# Patient Record
Sex: FEMALE | Race: WHITE
[De-identification: names, ages, dates, MRNs, and addresses within clinical notes are randomized per-mention and may not be internally consistent; named-entity substitution may affect disease eponyms.]

---

## 2017-02-01 ENCOUNTER — APPOINTMENT (OUTPATIENT)
Dept: CARDIOLOGY | Facility: CLINIC | Age: 77
End: 2017-02-01

## 2017-02-03 ENCOUNTER — APPOINTMENT (OUTPATIENT)
Dept: CARDIOLOGY | Facility: CLINIC | Age: 77
End: 2017-02-03

## 2017-03-02 ENCOUNTER — APPOINTMENT (OUTPATIENT)
Dept: CARDIOLOGY | Facility: CLINIC | Age: 77
End: 2017-03-02

## 2017-03-07 ENCOUNTER — APPOINTMENT (OUTPATIENT)
Dept: CARDIOLOGY | Facility: CLINIC | Age: 77
End: 2017-03-07

## 2017-05-19 ENCOUNTER — RX RENEWAL (OUTPATIENT)
Age: 77
End: 2017-05-19

## 2017-06-05 ENCOUNTER — OTHER (OUTPATIENT)
Age: 77
End: 2017-06-05

## 2017-06-13 ENCOUNTER — RX RENEWAL (OUTPATIENT)
Age: 77
End: 2017-06-13

## 2017-06-27 ENCOUNTER — APPOINTMENT (OUTPATIENT)
Dept: CARDIOLOGY | Facility: CLINIC | Age: 77
End: 2017-06-27

## 2017-06-27 VITALS
WEIGHT: 115 LBS | BODY MASS INDEX: 21.16 KG/M2 | OXYGEN SATURATION: 95 % | DIASTOLIC BLOOD PRESSURE: 70 MMHG | HEART RATE: 82 BPM | HEIGHT: 62 IN | SYSTOLIC BLOOD PRESSURE: 130 MMHG

## 2017-06-29 ENCOUNTER — NON-APPOINTMENT (OUTPATIENT)
Age: 77
End: 2017-06-29

## 2017-07-06 ENCOUNTER — TRANSCRIPTION ENCOUNTER (OUTPATIENT)
Age: 77
End: 2017-07-06

## 2017-07-07 ENCOUNTER — INPATIENT (INPATIENT)
Facility: HOSPITAL | Age: 77
LOS: 1 days | Discharge: HOME | End: 2017-07-09
Attending: INTERNAL MEDICINE | Admitting: INTERNAL MEDICINE
Payer: MEDICARE

## 2017-07-07 VITALS — HEIGHT: 62 IN | WEIGHT: 115.08 LBS

## 2017-07-07 DIAGNOSIS — I16.0 HYPERTENSIVE URGENCY: ICD-10-CM

## 2017-07-07 DIAGNOSIS — I25.10 ATHEROSCLEROTIC HEART DISEASE OF NATIVE CORONARY ARTERY WITHOUT ANGINA PECTORIS: ICD-10-CM

## 2017-07-07 LAB
ALBUMIN SERPL ELPH-MCNC: 3.6 G/DL — SIGNIFICANT CHANGE UP (ref 3.3–5)
ALP SERPL-CCNC: 101 U/L — SIGNIFICANT CHANGE UP (ref 40–120)
ALT FLD-CCNC: 26 U/L — SIGNIFICANT CHANGE UP (ref 12–78)
ANION GAP SERPL CALC-SCNC: 5 MMOL/L — SIGNIFICANT CHANGE UP (ref 5–17)
APTT BLD: 31.5 SEC — SIGNIFICANT CHANGE UP (ref 27.5–37.4)
AST SERPL-CCNC: 17 U/L — SIGNIFICANT CHANGE UP (ref 15–37)
BASOPHILS # BLD AUTO: 0.1 K/UL — SIGNIFICANT CHANGE UP (ref 0–0.2)
BASOPHILS NFR BLD AUTO: 0.8 % — SIGNIFICANT CHANGE UP (ref 0–2)
BILIRUB SERPL-MCNC: 0.4 MG/DL — SIGNIFICANT CHANGE UP (ref 0.2–1.2)
BUN SERPL-MCNC: 13 MG/DL — SIGNIFICANT CHANGE UP (ref 7–23)
CALCIUM SERPL-MCNC: 9.1 MG/DL — SIGNIFICANT CHANGE UP (ref 8.5–10.1)
CHLORIDE SERPL-SCNC: 103 MMOL/L — SIGNIFICANT CHANGE UP (ref 96–108)
CK SERPL-CCNC: 55 U/L — SIGNIFICANT CHANGE UP (ref 26–192)
CK SERPL-CCNC: 60 U/L — SIGNIFICANT CHANGE UP (ref 26–192)
CO2 SERPL-SCNC: 28 MMOL/L — SIGNIFICANT CHANGE UP (ref 22–31)
CREAT SERPL-MCNC: 0.76 MG/DL — SIGNIFICANT CHANGE UP (ref 0.5–1.3)
EOSINOPHIL # BLD AUTO: 0.2 K/UL — SIGNIFICANT CHANGE UP (ref 0–0.5)
EOSINOPHIL NFR BLD AUTO: 1.9 % — SIGNIFICANT CHANGE UP (ref 0–6)
GLUCOSE SERPL-MCNC: 98 MG/DL — SIGNIFICANT CHANGE UP (ref 70–99)
HCT VFR BLD CALC: 40.6 % — SIGNIFICANT CHANGE UP (ref 34.5–45)
HGB BLD-MCNC: 13.4 G/DL — SIGNIFICANT CHANGE UP (ref 11.5–15.5)
INR BLD: 1.12 RATIO — SIGNIFICANT CHANGE UP (ref 0.88–1.16)
LYMPHOCYTES # BLD AUTO: 1.8 K/UL — SIGNIFICANT CHANGE UP (ref 1–3.3)
LYMPHOCYTES # BLD AUTO: 17.7 % — SIGNIFICANT CHANGE UP (ref 13–44)
MCHC RBC-ENTMCNC: 30.4 PG — SIGNIFICANT CHANGE UP (ref 27–34)
MCHC RBC-ENTMCNC: 32.9 GM/DL — SIGNIFICANT CHANGE UP (ref 32–36)
MCV RBC AUTO: 92.3 FL — SIGNIFICANT CHANGE UP (ref 80–100)
MONOCYTES # BLD AUTO: 1.1 K/UL — HIGH (ref 0–0.9)
MONOCYTES NFR BLD AUTO: 11.2 % — SIGNIFICANT CHANGE UP (ref 2–14)
NEUTROPHILS # BLD AUTO: 6.9 K/UL — SIGNIFICANT CHANGE UP (ref 1.8–7.4)
NEUTROPHILS NFR BLD AUTO: 68.4 % — SIGNIFICANT CHANGE UP (ref 43–77)
NT-PROBNP SERPL-SCNC: 4689 PG/ML — HIGH (ref 0–450)
PLATELET # BLD AUTO: 302 K/UL — SIGNIFICANT CHANGE UP (ref 150–400)
POTASSIUM SERPL-MCNC: 4.1 MMOL/L — SIGNIFICANT CHANGE UP (ref 3.5–5.3)
POTASSIUM SERPL-SCNC: 4.1 MMOL/L — SIGNIFICANT CHANGE UP (ref 3.5–5.3)
PROT SERPL-MCNC: 7.2 GM/DL — SIGNIFICANT CHANGE UP (ref 6–8.3)
PROTHROM AB SERPL-ACNC: 12.1 SEC — SIGNIFICANT CHANGE UP (ref 9.8–12.7)
RBC # BLD: 4.4 M/UL — SIGNIFICANT CHANGE UP (ref 3.8–5.2)
RBC # FLD: 14.3 % — SIGNIFICANT CHANGE UP (ref 10.3–14.5)
SODIUM SERPL-SCNC: 136 MMOL/L — SIGNIFICANT CHANGE UP (ref 135–145)
TROPONIN I SERPL-MCNC: <0.015 NG/ML — SIGNIFICANT CHANGE UP (ref 0.01–0.04)
TROPONIN I SERPL-MCNC: <0.015 NG/ML — SIGNIFICANT CHANGE UP (ref 0.01–0.04)
WBC # BLD: 10.1 K/UL — SIGNIFICANT CHANGE UP (ref 3.8–10.5)
WBC # FLD AUTO: 10.1 K/UL — SIGNIFICANT CHANGE UP (ref 3.8–10.5)

## 2017-07-07 PROCEDURE — 99285 EMERGENCY DEPT VISIT HI MDM: CPT

## 2017-07-07 PROCEDURE — 93010 ELECTROCARDIOGRAM REPORT: CPT

## 2017-07-07 PROCEDURE — 71275 CT ANGIOGRAPHY CHEST: CPT | Mod: 26

## 2017-07-07 PROCEDURE — 99223 1ST HOSP IP/OBS HIGH 75: CPT

## 2017-07-07 PROCEDURE — 70450 CT HEAD/BRAIN W/O DYE: CPT | Mod: 26

## 2017-07-07 PROCEDURE — 71020: CPT | Mod: 26

## 2017-07-07 RX ORDER — AMLODIPINE BESYLATE 2.5 MG/1
2.5 TABLET ORAL
Qty: 0 | Refills: 0 | Status: DISCONTINUED | OUTPATIENT
Start: 2017-07-07 | End: 2017-07-08

## 2017-07-07 RX ORDER — OXYCODONE HYDROCHLORIDE 5 MG/1
5 TABLET ORAL ONCE
Qty: 0 | Refills: 0 | Status: DISCONTINUED | OUTPATIENT
Start: 2017-07-07 | End: 2017-07-07

## 2017-07-07 RX ORDER — ACETAMINOPHEN 500 MG
650 TABLET ORAL ONCE
Qty: 0 | Refills: 0 | Status: COMPLETED | OUTPATIENT
Start: 2017-07-07 | End: 2017-07-07

## 2017-07-07 RX ORDER — CARVEDILOL PHOSPHATE 80 MG/1
12.5 CAPSULE, EXTENDED RELEASE ORAL EVERY 12 HOURS
Qty: 0 | Refills: 0 | Status: DISCONTINUED | OUTPATIENT
Start: 2017-07-07 | End: 2017-07-09

## 2017-07-07 RX ORDER — CLOPIDOGREL BISULFATE 75 MG/1
75 TABLET, FILM COATED ORAL DAILY
Qty: 0 | Refills: 0 | Status: DISCONTINUED | OUTPATIENT
Start: 2017-07-07 | End: 2017-07-09

## 2017-07-07 RX ORDER — NICOTINE POLACRILEX 2 MG
1 GUM BUCCAL DAILY
Qty: 0 | Refills: 0 | Status: DISCONTINUED | OUTPATIENT
Start: 2017-07-07 | End: 2017-07-09

## 2017-07-07 RX ORDER — ASPIRIN/CALCIUM CARB/MAGNESIUM 324 MG
81 TABLET ORAL DAILY
Qty: 0 | Refills: 0 | Status: DISCONTINUED | OUTPATIENT
Start: 2017-07-07 | End: 2017-07-07

## 2017-07-07 RX ORDER — LABETALOL HCL 100 MG
20 TABLET ORAL ONCE
Qty: 0 | Refills: 0 | Status: COMPLETED | OUTPATIENT
Start: 2017-07-07 | End: 2017-07-07

## 2017-07-07 RX ORDER — LOSARTAN POTASSIUM 100 MG/1
0 TABLET, FILM COATED ORAL
Qty: 0 | Refills: 0 | COMMUNITY

## 2017-07-07 RX ORDER — METOPROLOL TARTRATE 50 MG
50 TABLET ORAL
Qty: 0 | Refills: 0 | Status: DISCONTINUED | OUTPATIENT
Start: 2017-07-07 | End: 2017-07-07

## 2017-07-07 RX ORDER — LABETALOL HCL 100 MG
10 TABLET ORAL ONCE
Qty: 0 | Refills: 0 | Status: COMPLETED | OUTPATIENT
Start: 2017-07-07 | End: 2017-07-07

## 2017-07-07 RX ORDER — LOSARTAN POTASSIUM 100 MG/1
50 TABLET, FILM COATED ORAL
Qty: 0 | Refills: 0 | Status: DISCONTINUED | OUTPATIENT
Start: 2017-07-07 | End: 2017-07-09

## 2017-07-07 RX ORDER — RANITIDINE HYDROCHLORIDE 150 MG/1
0 TABLET, FILM COATED ORAL
Qty: 0 | Refills: 0 | COMMUNITY

## 2017-07-07 RX ORDER — FAMOTIDINE 10 MG/ML
20 INJECTION INTRAVENOUS AT BEDTIME
Qty: 0 | Refills: 0 | Status: DISCONTINUED | OUTPATIENT
Start: 2017-07-07 | End: 2017-07-09

## 2017-07-07 RX ORDER — ACETAMINOPHEN 500 MG
650 TABLET ORAL EVERY 6 HOURS
Qty: 0 | Refills: 0 | Status: DISCONTINUED | OUTPATIENT
Start: 2017-07-07 | End: 2017-07-09

## 2017-07-07 RX ORDER — LOSARTAN POTASSIUM 100 MG/1
25 TABLET, FILM COATED ORAL
Qty: 0 | Refills: 0 | Status: DISCONTINUED | OUTPATIENT
Start: 2017-07-07 | End: 2017-07-07

## 2017-07-07 RX ORDER — ATORVASTATIN CALCIUM 80 MG/1
80 TABLET, FILM COATED ORAL AT BEDTIME
Qty: 0 | Refills: 0 | Status: DISCONTINUED | OUTPATIENT
Start: 2017-07-07 | End: 2017-07-09

## 2017-07-07 RX ORDER — HEPARIN SODIUM 5000 [USP'U]/ML
5000 INJECTION INTRAVENOUS; SUBCUTANEOUS EVERY 12 HOURS
Qty: 0 | Refills: 0 | Status: DISCONTINUED | OUTPATIENT
Start: 2017-07-07 | End: 2017-07-09

## 2017-07-07 RX ORDER — SODIUM CHLORIDE 9 MG/ML
3 INJECTION INTRAMUSCULAR; INTRAVENOUS; SUBCUTANEOUS ONCE
Qty: 0 | Refills: 0 | Status: COMPLETED | OUTPATIENT
Start: 2017-07-07 | End: 2017-07-07

## 2017-07-07 RX ORDER — BUDESONIDE AND FORMOTEROL FUMARATE DIHYDRATE 160; 4.5 UG/1; UG/1
2 AEROSOL RESPIRATORY (INHALATION)
Qty: 0 | Refills: 0 | Status: DISCONTINUED | OUTPATIENT
Start: 2017-07-07 | End: 2017-07-09

## 2017-07-07 RX ADMIN — Medication 81 MILLIGRAM(S): at 18:07

## 2017-07-07 RX ADMIN — LOSARTAN POTASSIUM 50 MILLIGRAM(S): 100 TABLET, FILM COATED ORAL at 18:50

## 2017-07-07 RX ADMIN — Medication 650 MILLIGRAM(S): at 13:35

## 2017-07-07 RX ADMIN — ATORVASTATIN CALCIUM 80 MILLIGRAM(S): 80 TABLET, FILM COATED ORAL at 20:50

## 2017-07-07 RX ADMIN — AMLODIPINE BESYLATE 2.5 MILLIGRAM(S): 2.5 TABLET ORAL at 20:50

## 2017-07-07 RX ADMIN — CARVEDILOL PHOSPHATE 12.5 MILLIGRAM(S): 80 CAPSULE, EXTENDED RELEASE ORAL at 18:50

## 2017-07-07 RX ADMIN — Medication 650 MILLIGRAM(S): at 13:05

## 2017-07-07 RX ADMIN — CLOPIDOGREL BISULFATE 75 MILLIGRAM(S): 75 TABLET, FILM COATED ORAL at 18:07

## 2017-07-07 RX ADMIN — OXYCODONE HYDROCHLORIDE 5 MILLIGRAM(S): 5 TABLET ORAL at 22:05

## 2017-07-07 RX ADMIN — OXYCODONE HYDROCHLORIDE 5 MILLIGRAM(S): 5 TABLET ORAL at 22:42

## 2017-07-07 RX ADMIN — SODIUM CHLORIDE 3 MILLILITER(S): 9 INJECTION INTRAMUSCULAR; INTRAVENOUS; SUBCUTANEOUS at 11:30

## 2017-07-07 RX ADMIN — Medication 10 MILLIGRAM(S): at 11:30

## 2017-07-07 RX ADMIN — Medication 20 MILLIGRAM(S): at 13:07

## 2017-07-07 RX ADMIN — Medication 10 MILLIGRAM(S): at 12:15

## 2017-07-07 NOTE — H&P ADULT - HISTORY OF PRESENT ILLNESS
65 y/o female with HTN, CAD s/p PCI, HL and remote h/o PUD and C. diff presents with /120.  Denies CP or SOB or Back pain.  HCT negative for acute finding.  CXR--no infiltrates/normal mediastinum EKG--NSR with inverted T in lat leads. Pt was given labetalol 10/10/20 IV.  Current /60 HR 70s  On my exam, awake and alert, NAD, denies CP or SOB, Back pain.  C/O L shoulder pain secondary to musculoskeletal pain. Trop negative X 1    Pt being admitted to tele

## 2017-07-07 NOTE — H&P ADULT - NSHPLABSRESULTS_GEN_ALL_CORE
13.4   10.1  )-----------( 302      ( 07 Jul 2017 10:50 )             40.6       CBC Full  -  ( 07 Jul 2017 10:50 )  WBC Count : 10.1 K/uL  Hemoglobin : 13.4 g/dL  Hematocrit : 40.6 %  Platelet Count - Automated : 302 K/uL  Mean Cell Volume : 92.3 fl  Mean Cell Hemoglobin : 30.4 pg  Mean Cell Hemoglobin Concentration : 32.9 gm/dL  Auto Neutrophil # : 6.9 K/uL  Auto Lymphocyte # : 1.8 K/uL  Auto Monocyte # : 1.1 K/uL  Auto Eosinophil # : 0.2 K/uL  Auto Basophil # : 0.1 K/uL  Auto Neutrophil % : 68.4 %  Auto Lymphocyte % : 17.7 %  Auto Monocyte % : 11.2 %  Auto Eosinophil % : 1.9 %  Auto Basophil % : 0.8 %      07-07    136  |  103  |  13  ----------------------------<  98  4.1   |  28  |  0.76    Ca    9.1      07 Jul 2017 10:50    TPro  7.2  /  Alb  3.6  /  TBili  0.4  /  DBili  x   /  AST  17  /  ALT  26  /  AlkPhos  101  07-07      PT/INR - ( 07 Jul 2017 10:50 )   PT: 12.1 sec;   INR: 1.12 ratio         PTT - ( 07 Jul 2017 10:50 )  PTT:31.5 sec        LIVER FUNCTIONS - ( 07 Jul 2017 10:50 )  Alb: 3.6 g/dL / Pro: 7.2 gm/dL / ALK PHOS: 101 U/L / ALT: 26 U/L / AST: 17 U/L / GGT: x                 CARDIAC MARKERS ( 07 Jul 2017 10:50 )  <0.015 ng/mL / x     / 60 U/L / x     / x

## 2017-07-07 NOTE — H&P ADULT - NSHPPHYSICALEXAM_GEN_ALL_CORE
Vital Signs Last 24 Hrs  T(C): 37 (07 Jul 2017 15:26), Max: 37.1 (07 Jul 2017 10:42)  T(F): 98.6 (07 Jul 2017 15:26), Max: 98.7 (07 Jul 2017 10:42)  HR: 71 (07 Jul 2017 15:26) (67 - 76)  BP: 160/60 (07 Jul 2017 15:43) (158/65 - 255/78)  BP(mean): --  RR: 18 (07 Jul 2017 15:26) (18 - 19)  SpO2: 96% (07 Jul 2017 15:26) (96% - 96%)

## 2017-07-07 NOTE — CONSULT NOTE ADULT - PROBLEM SELECTOR RECOMMENDATION 9
CT of chest to r/o dissection and BP control with increased losartan, add norvasc and change lopressor to carvedilol. Echo to evaluate any change in LV FX (elevated pro BNP)

## 2017-07-07 NOTE — ED STATDOCS - PROGRESS NOTE DETAILS
Adela Orellana: 76 y/o F with hx of HTN, CAD, and MI presents to the ED c/o high blood pressure at home. Pt states she had left arm pain since yesterday, went to urgent care yesterday, dx with tendonitis, and had a high blood pressure reading. She took her blood pressure again this morning at home and it was high, and came to the ED for further evaluation. Currently pt has no other complaints and denies CP, SOB, back pain, fever, and chills. Pt to be sent to main ED for further evaluation and monitoring due to HTN and EKG changes.

## 2017-07-07 NOTE — ED ADULT NURSE REASSESSMENT NOTE - COMFORT CARE
repositioned/warm blanket provided/side rails up/patient is comfortable and in high spirits with family is at bedside. hourly rounding completed/meal provided

## 2017-07-07 NOTE — ED ADULT NURSE REASSESSMENT NOTE - NS ED NURSE REASSESS COMMENT FT1
Ct of the head negative, pt provided meal, tolerated meal well. Will continue to monitor for safety and comfort.

## 2017-07-07 NOTE — ED ADULT NURSE REASSESSMENT NOTE - NS ED NURSE REASSESS COMMENT FT1
Dr. Garner, hospitalist is evaluating pt at this time, will continue to monitor for further instruction.

## 2017-07-07 NOTE — ED STATDOCS - MEDICAL DECISION MAKING DETAILS
I, Hong Greenwood MD, performed the initial face to face bedside interview with this patient regarding history of present illness and determined that the patient should be seen in the main ED.  The history, was documented by the scribe in my presence and I attest to the accuracy of the documentation.

## 2017-07-07 NOTE — H&P ADULT - ASSESSMENT
IMP:    63 y/o female with HTN, CAD s/p PCI, HL and remote h/o PUD and C. diff presents with hypertensive urgency    Plan:    Admit to tele    Case d/w dr doe who will see pt tonight--adjust BP meds  Cont with ASA/plavix/statin  Compare current EKG to prior  DVT prophy--sqhep  DASH diet

## 2017-07-07 NOTE — ED PROVIDER NOTE - MEDICAL DECISION MAKING DETAILS
78 y/o F c/o increased L shoulder pain seen at urgent care, HTN at urgent care, sent in to ED w/ plans to receive EKG, CXR, XR imaging.

## 2017-07-07 NOTE — CONSULT NOTE ADULT - SUBJECTIVE AND OBJECTIVE BOX
HPI:  63 y/o female with HTN, CAD (Inferior Wall MI with PCI or RCA), HL, PVD, and AAA who on wednesday was doing some work around house and exacerbated her shoulder and continued to have discomfort there and went to an urgent care center Thursday to be evaluated and was told blood pressure was 250 systolic.  Apparently was sent home from there as it was thought that machine was broken.  Pt. herself rechecked blood pressure this AM and again it was >250 mmhg sysytolic.and came to be evaluated.  In ER /120.  Denies CP or SOB or current significant back pain, but approximately 3 weeks ago had severe back pain attributed to disk disease which resolved and says now only minimal (lower back).  Noptes a history of Blood pressure differences in her arms (60mmhg).  IN ER she had a Head CT negative for acute finding. Received labetolol in ER with improvement to  /60 HR 70s. Denies wheezing, cough, phlegm or fever.  Denies orthopnea, PND, or LE edema.  Denies claudication as well.      PAST MEDICAL & SURGICAL HISTORY:  MI (myocardial infarction) Inferior .  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 Carotid angiogram with Dr. Marty Macias: 50% right internal carotid artery stenosis, 60% left internal carotid artery stenosis.  Abdominal ultrasound 2017:2.5 cm aneurysm: [facility] 2010: Mild to moderate disease on the left and mild disease on right. LmwlpApzqflr5Keomj   EK17, NSR, LAE, Lateral ST segment changes unchanged from the past. old inf. MI., NC. QnbfcDxmevhy4Npn CccyuWzktztf99Tnxhy   Stress Test: 2012, no Ischemia, no exercise induced arrhythmias, no Symptoms HhsqqJpsriub18Zik ZepafLvjiqky97Yzerh   Echo: 3/2017, 1-2 + MR.Mlid LVH, borderline pulm. HTN., normal LV function, no pulmonary hypertension, normal LA size LVEF 65%. TzfldAigxevh76Vfd KevltOoxgltx02Vdbng VdgpvExguyaa20Bge DdwhbNzgnzsp43Pcfwi HxcdyHtrerzv12Tyw QymirCgfingn35Iedpe WptpbDkydgio42Ixm PquzyUuntsye00Nsecw PurleHvrfugs13Utr TeowkRlhpdvz4Lcvbh   Cardiac Cath: , 1 Vessel CAD with acute inferior MI and EF 50 % PCI of RCA. PmlycFibouej8Bdh ZzrjiDrchewk32Gwtxr   Stent: , SOCRATES to RCA    SOCIAL HISTORY: Current 1 PPD Smoker/Social ETOH/ No Ilicit Drug use.    FAMILY HISTORY:  Family history of CAD    Allergies  predniSONE (Other (Moderate))    Intolerances    HOME MEDICATIONS:   Current Meds  CurrentMeds_4_twCiteListControlStart Advair Diskus 250-50 MCG/DOSE Inhalation Aerosol Powder Breath Activated; INHALE 1  PUFF BY MOUTH TWICE DAILY  Aspirin Low Dose 81 MG TABS; TAKE 1 TABLET DAILY  Atorvastatin Calcium 80 MG Oral Tablet; TAKE 1 TABLET BY MOUTH ONCE DAILY  Clopidogrel Bisulfate 75 MG Oral Tablet; TAKE 1 TABLET BY MOUTH ONCE DAILY  Losartan Potassium 25 MG Oral Tablet; TAKE 1 TABLET BY MOUTH TWICE DAILY  Metoprolol Tartrate 50 MG Oral Tablet; TAKE 1 TABLET BY MOUTH EVERY 12 HOURS  Prevnar 13 Intramuscular Suspension; INJECT 0.5  ML Intramuscular  ProAir  (90 Base) MCG/ACT Inhalation Aerosol Solution; inhale 2 puffs by mouth  every 4 hours if needed  RaNITidine HCl - 150 MG Oral Tablet; take 1 tablet by mouth at bedtime    HOSPITAL MEDICATIONS:   MEDICATIONS  (STANDING):  atorvastatin 80 milliGRAM(s) Oral at bedtime  clopidogrel Tablet 75 milliGRAM(s) Oral daily  heparin  Injectable 5000 Unit(s) SubCutaneous every 12 hours  metoprolol 50 milliGRAM(s) Oral two times a day  losartan 25 milliGRAM(s) Oral two times a day  aspirin  chewable 81 milliGRAM(s) Oral daily    MEDICATIONS  (PRN):  acetaminophen   Tablet. 650 milliGRAM(s) Oral every 6 hours PRN Mild Pain (1 - 3)      REVIEW OF SYSTEMS: 13 systems were reviewed and all negative except for comments above.    Vital Signs Last 24 Hrs  T(C): 36.9 (2017 17:00), Max: 37.1 (2017 10:42)  T(F): 98.5 (2017 17:00), Max: 98.7 (2017 10:42)  HR: 75 (2017 17:36) (67 - 76)  BP: 214/68 (2017 17:36) (158/64 - 255/78)  BP(mean): --  RR: 17 (2017 17:36) (17 - 21)  SpO2: 96% (2017 17:00) (96% - 96%)Daily Height in cm: 157.48 (2017 10:35)    Daily I&O's Summary      PHYSICAL EXAM:    Constitutional: NAD, awake and alert, well-developed  HEENT: PERRLA, EOMI,  No oral cyanosis. Oropharynx Clean and Dry.  Neck:  supple,  No JVD, No Thyroid enlargement. + Carotid Bruits bilaterally.  Respiratory: Breath sounds are clear bilaterally, No wheezing, rales or rhonchi  Cardiovascular: NL S1 and S2, RRR, +s4. 1/6 KOLTON to LLSB.  Gastrointestinal: Bowel Sounds present, soft, NT, ND, No HSM.   Extremities: No peripheral edema. No clubbing or cyanosis.    Vascular: 1-2+ peripheral pulses in UE bilaterally.  +1 pulse in both femoral arteries as well.   Neurological: A/O x 3, no focal motor or sensory deficits  Musculoskeletal: no calf tenderness or joint swelling.  Skin: No rashes     LABS: All Labs Reviewed:                        13.4   10.1  )-----------( 302      ( 2017 10:50 )             40.6     2017 10:50    136    |  103    |  13     ----------------------------<  98     4.1     |  28     |  0.76     Ca    9.1        2017 10:50    TPro  7.2    /  Alb  3.6    /  TBili  0.4    /  DBili  x      /  AST  17     /  ALT  26     /  AlkPhos  101    2017 10:50    PT/INR - ( 2017 10:50 )   PT: 12.1 sec;   INR: 1.12 ratio         PTT - ( 2017 10:50 )  PTT:31.5 sec  CARDIAC MARKERS ( 2017 10:50 )  <0.015 ng/mL / x     / 60 U/L / x     / x        Pro Bnp [id number] HPI:  63 y/o female with HTN, CAD (Inferior Wall MI with PCI or RCA), HL, PVD, and AAA who on wednesday was doing some work around house and exacerbated her shoulder and continued to have discomfort there and went to an urgent care center Thursday to be evaluated and was told blood pressure was 250 systolic.  Apparently was sent home from there as it was thought that machine was broken.  Pt. herself rechecked blood pressure this AM and again it was >250 mmhg sysytolic.and came to be evaluated.  In ER /120.  Denies CP or SOB or current significant back pain, but approximately 3 weeks ago had severe back pain attributed to disk disease which resolved and says now only minimal (lower back).  Noptes a history of Blood pressure differences in her arms (60mmhg).  IN ER she had a Head CT negative for acute finding. Received labetolol in ER with improvement to  /60 HR 70s. Denies wheezing, cough, phlegm or fever.  Denies orthopnea, PND, or LE edema.  Denies claudication as well.      PAST MEDICAL & SURGICAL HISTORY:  MI (myocardial infarction) Inferior .  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 Carotid angiogram with Dr. Marty Macias: 50% right internal carotid artery stenosis, 60% left internal carotid artery stenosis.  Abdominal ultrasound 2017:2.5 cm aneurysm: [facility] 2010: Mild to moderate disease on the left and mild disease on right. EvsejFavtbeh2Ikqot   EK17, NSR, LAE, Lateral ST segment changes unchanged from the past. old inf. MI., NC. CfgqlTmoglgb4Hnf OfjkiMfstrmt21Dpcqb   Stress Test: 2012, no Ischemia, no exercise induced arrhythmias, no Symptoms ZwxmwQfomrns64Nxy QazktOdccaqd91Uxdpf   Echo: 3/2017, 1-2 + MR.Mlid LVH, borderline pulm. HTN., normal LV function, no pulmonary hypertension, normal LA size LVEF 65%. IvmjjTbmghxv31Pck AezmoBjhkcfx12Tonzy KokvuHtrbgiz58Ndp WhjdkCrkskcy83Zlkoi KzebgRvpaxax82Puh BcywdTcwnzgk66Pzdsj HfpqkBhchnjl77Ldn AujinAzwcvfz27Qzqds AixwzVstphng62Qzt NwyinYmshcql2Ulcwf   Cardiac Cath: , 1 Vessel CAD with acute inferior MI and EF 50 % PCI of RCA. GbwrpPzjkgob6Jtl FqepeDvmnkxt20Ubdcc   Stent: , SOCRATES to RCA    SOCIAL HISTORY: Current 1 PPD Smoker/Social ETOH/ No Ilicit Drug use.    FAMILY HISTORY:  Family history of CAD    Allergies  predniSONE (Other (Moderate))    Intolerances    HOME MEDICATIONS:   Current Meds  CurrentMeds_4_twCiteListControlStart Advair Diskus 250-50 MCG/DOSE Inhalation Aerosol Powder Breath Activated; INHALE 1  PUFF BY MOUTH TWICE DAILY  Aspirin Low Dose 81 MG TABS; TAKE 1 TABLET DAILY  Atorvastatin Calcium 80 MG Oral Tablet; TAKE 1 TABLET BY MOUTH ONCE DAILY  Clopidogrel Bisulfate 75 MG Oral Tablet; TAKE 1 TABLET BY MOUTH ONCE DAILY  Losartan Potassium 25 MG Oral Tablet; TAKE 1 TABLET BY MOUTH TWICE DAILY  Metoprolol Tartrate 50 MG Oral Tablet; TAKE 1 TABLET BY MOUTH EVERY 12 HOURS  Prevnar 13 Intramuscular Suspension; INJECT 0.5  ML Intramuscular  ProAir  (90 Base) MCG/ACT Inhalation Aerosol Solution; inhale 2 puffs by mouth  every 4 hours if needed  RaNITidine HCl - 150 MG Oral Tablet; take 1 tablet by mouth at bedtime    HOSPITAL MEDICATIONS:   MEDICATIONS  (STANDING):  atorvastatin 80 milliGRAM(s) Oral at bedtime  clopidogrel Tablet 75 milliGRAM(s) Oral daily  heparin  Injectable 5000 Unit(s) SubCutaneous every 12 hours  metoprolol 50 milliGRAM(s) Oral two times a day  losartan 25 milliGRAM(s) Oral two times a day  aspirin  chewable 81 milliGRAM(s) Oral daily    MEDICATIONS  (PRN):  acetaminophen   Tablet. 650 milliGRAM(s) Oral every 6 hours PRN Mild Pain (1 - 3)      REVIEW OF SYSTEMS: 13 systems were reviewed and all negative except for comments above.    Vital Signs Last 24 Hrs  T(C): 36.9 (2017 17:00), Max: 37.1 (2017 10:42)  T(F): 98.5 (2017 17:00), Max: 98.7 (2017 10:42)  HR: 75 (2017 17:36) (67 - 76)  BP: 214/68 (2017 17:36) (158/64 - 255/78)  BP(mean): --  RR: 17 (2017 17:36) (17 - 21)  SpO2: 96% (2017 17:00) (96% - 96%)Daily Height in cm: 157.48 (2017 10:35)    Daily I&O's Summary      PHYSICAL EXAM:    Constitutional: NAD, awake and alert, well-developed  HEENT: PERRLA, EOMI,  No oral cyanosis. Oropharynx Clean and Dry.  Neck:  supple,  No JVD, No Thyroid enlargement. + Carotid Bruits bilaterally.  Respiratory: Breath sounds are clear bilaterally, No wheezing, rales or rhonchi  Cardiovascular: NL S1 and S2, RRR, +s4. 1/6 KOLTON to LLSB.  Gastrointestinal: Bowel Sounds present, soft, NT, ND, No HSM.   Extremities: No peripheral edema. No clubbing or cyanosis.    Vascular: 1-2+ peripheral pulses in UE bilaterally.  +1 pulse in both femoral arteries as well.   Neurological: A/O x 3, no focal motor or sensory deficits  Musculoskeletal: no calf tenderness or joint swelling.  Skin: No rashes     LABS: All Labs Reviewed:                        13.4   10.1  )-----------( 302      ( 2017 10:50 )             40.6     2017 10:50    136    |  103    |  13     ----------------------------<  98     4.1     |  28     |  0.76     Ca    9.1        2017 10:50    TPro  7.2    /  Alb  3.6    /  TBili  0.4    /  DBili  x      /  AST  17     /  ALT  26     /  AlkPhos  101    2017 10:50    PT/INR - ( 2017 10:50 )   PT: 12.1 sec;   INR: 1.12 ratio         PTT - ( 2017 10:50 )  PTT:31.5 sec  CARDIAC MARKERS ( 2017 10:50 )  <0.015 ng/mL / x     / 60 U/L / x     / x        Pro Bnp [id number]    EKG: NSR, inferior wall MI with inferolateral ST changes, LAE  (unchanged from old)

## 2017-07-07 NOTE — ED PROVIDER NOTE - OBJECTIVE STATEMENT
78 y/o F with hx of HTN, CAD, COPD and MI presents to the ED c/o high blood pressure at home. Pt states she had left arm pain since yesterday, went to urgent care yesterday, dx with tendonitis, and had a high blood pressure reading. She took her blood pressure again this morning at home and it was high, and came to the ED for further evaluation. Currently pt has no other complaints and denies CP, SOB, back pain, fever, and chills. Pt arm pain not related to cardiac as pt not experiencing SOB, CP.

## 2017-07-07 NOTE — ED ADULT TRIAGE NOTE - CHIEF COMPLAINT QUOTE
Left arm pain from what patient states is tendonitis and hurts upon movement extends to back of neck. Pt also states she is hypertensive and has a headache for 2 days

## 2017-07-08 LAB
ANION GAP SERPL CALC-SCNC: 7 MMOL/L — SIGNIFICANT CHANGE UP (ref 5–17)
BUN SERPL-MCNC: 15 MG/DL — SIGNIFICANT CHANGE UP (ref 7–23)
CALCIUM SERPL-MCNC: 8.5 MG/DL — SIGNIFICANT CHANGE UP (ref 8.5–10.1)
CHLORIDE SERPL-SCNC: 104 MMOL/L — SIGNIFICANT CHANGE UP (ref 96–108)
CK SERPL-CCNC: 49 U/L — SIGNIFICANT CHANGE UP (ref 26–192)
CK SERPL-CCNC: 49 U/L — SIGNIFICANT CHANGE UP (ref 26–192)
CO2 SERPL-SCNC: 27 MMOL/L — SIGNIFICANT CHANGE UP (ref 22–31)
CREAT SERPL-MCNC: 0.64 MG/DL — SIGNIFICANT CHANGE UP (ref 0.5–1.3)
GLUCOSE SERPL-MCNC: 93 MG/DL — SIGNIFICANT CHANGE UP (ref 70–99)
HCT VFR BLD CALC: 35.1 % — SIGNIFICANT CHANGE UP (ref 34.5–45)
HGB BLD-MCNC: 11.7 G/DL — SIGNIFICANT CHANGE UP (ref 11.5–15.5)
MAGNESIUM SERPL-MCNC: 2 MG/DL — SIGNIFICANT CHANGE UP (ref 1.6–2.6)
MCHC RBC-ENTMCNC: 30.7 PG — SIGNIFICANT CHANGE UP (ref 27–34)
MCHC RBC-ENTMCNC: 33.4 GM/DL — SIGNIFICANT CHANGE UP (ref 32–36)
MCV RBC AUTO: 92.1 FL — SIGNIFICANT CHANGE UP (ref 80–100)
PHOSPHATE SERPL-MCNC: 2.9 MG/DL — SIGNIFICANT CHANGE UP (ref 2.5–4.5)
PLATELET # BLD AUTO: 266 K/UL — SIGNIFICANT CHANGE UP (ref 150–400)
POTASSIUM SERPL-MCNC: 3.9 MMOL/L — SIGNIFICANT CHANGE UP (ref 3.5–5.3)
POTASSIUM SERPL-SCNC: 3.9 MMOL/L — SIGNIFICANT CHANGE UP (ref 3.5–5.3)
RBC # BLD: 3.81 M/UL — SIGNIFICANT CHANGE UP (ref 3.8–5.2)
RBC # FLD: 14 % — SIGNIFICANT CHANGE UP (ref 10.3–14.5)
SODIUM SERPL-SCNC: 138 MMOL/L — SIGNIFICANT CHANGE UP (ref 135–145)
TROPONIN I SERPL-MCNC: <0.015 NG/ML — SIGNIFICANT CHANGE UP (ref 0.01–0.04)
TROPONIN I SERPL-MCNC: <0.015 NG/ML — SIGNIFICANT CHANGE UP (ref 0.01–0.04)
WBC # BLD: 8.1 K/UL — SIGNIFICANT CHANGE UP (ref 3.8–10.5)
WBC # FLD AUTO: 8.1 K/UL — SIGNIFICANT CHANGE UP (ref 3.8–10.5)

## 2017-07-08 PROCEDURE — 93306 TTE W/DOPPLER COMPLETE: CPT | Mod: 26

## 2017-07-08 PROCEDURE — 93010 ELECTROCARDIOGRAM REPORT: CPT

## 2017-07-08 PROCEDURE — 99233 SBSQ HOSP IP/OBS HIGH 50: CPT

## 2017-07-08 RX ORDER — ALBUTEROL 90 UG/1
2.5 AEROSOL, METERED ORAL ONCE
Qty: 0 | Refills: 0 | Status: COMPLETED | OUTPATIENT
Start: 2017-07-08 | End: 2017-07-08

## 2017-07-08 RX ORDER — ASPIRIN/CALCIUM CARB/MAGNESIUM 324 MG
81 TABLET ORAL DAILY
Qty: 0 | Refills: 0 | Status: DISCONTINUED | OUTPATIENT
Start: 2017-07-08 | End: 2017-07-09

## 2017-07-08 RX ORDER — AMLODIPINE BESYLATE 2.5 MG/1
5 TABLET ORAL EVERY 12 HOURS
Qty: 0 | Refills: 0 | Status: DISCONTINUED | OUTPATIENT
Start: 2017-07-08 | End: 2017-07-09

## 2017-07-08 RX ADMIN — BUDESONIDE AND FORMOTEROL FUMARATE DIHYDRATE 2 PUFF(S): 160; 4.5 AEROSOL RESPIRATORY (INHALATION) at 09:09

## 2017-07-08 RX ADMIN — Medication 650 MILLIGRAM(S): at 21:18

## 2017-07-08 RX ADMIN — CARVEDILOL PHOSPHATE 12.5 MILLIGRAM(S): 80 CAPSULE, EXTENDED RELEASE ORAL at 17:31

## 2017-07-08 RX ADMIN — Medication 81 MILLIGRAM(S): at 15:46

## 2017-07-08 RX ADMIN — FAMOTIDINE 20 MILLIGRAM(S): 10 INJECTION INTRAVENOUS at 21:18

## 2017-07-08 RX ADMIN — BUDESONIDE AND FORMOTEROL FUMARATE DIHYDRATE 2 PUFF(S): 160; 4.5 AEROSOL RESPIRATORY (INHALATION) at 20:40

## 2017-07-08 RX ADMIN — Medication 650 MILLIGRAM(S): at 10:57

## 2017-07-08 RX ADMIN — ALBUTEROL 2.5 MILLIGRAM(S): 90 AEROSOL, METERED ORAL at 20:42

## 2017-07-08 RX ADMIN — LOSARTAN POTASSIUM 50 MILLIGRAM(S): 100 TABLET, FILM COATED ORAL at 17:31

## 2017-07-08 RX ADMIN — CLOPIDOGREL BISULFATE 75 MILLIGRAM(S): 75 TABLET, FILM COATED ORAL at 13:08

## 2017-07-08 RX ADMIN — LOSARTAN POTASSIUM 50 MILLIGRAM(S): 100 TABLET, FILM COATED ORAL at 05:56

## 2017-07-08 RX ADMIN — AMLODIPINE BESYLATE 2.5 MILLIGRAM(S): 2.5 TABLET ORAL at 05:55

## 2017-07-08 RX ADMIN — ATORVASTATIN CALCIUM 80 MILLIGRAM(S): 80 TABLET, FILM COATED ORAL at 21:18

## 2017-07-08 RX ADMIN — AMLODIPINE BESYLATE 5 MILLIGRAM(S): 2.5 TABLET ORAL at 16:54

## 2017-07-08 RX ADMIN — Medication 650 MILLIGRAM(S): at 22:18

## 2017-07-08 RX ADMIN — CARVEDILOL PHOSPHATE 12.5 MILLIGRAM(S): 80 CAPSULE, EXTENDED RELEASE ORAL at 05:56

## 2017-07-08 NOTE — CHART NOTE - NSCHARTNOTEFT_GEN_A_CORE
called by rn for patient wheezing    albuterol x 1 ordered.  patient on advair and albuterol at home for respiratory disease

## 2017-07-08 NOTE — PROGRESS NOTE ADULT - SUBJECTIVE AND OBJECTIVE BOX
PCP:    REQUESTING PHYSICIAN:    REASON FOR CONSULT:    CHIEF COMPLAINT:    HPI:  63 y/o female with HTN, CAD (Inferior Wall MI with PCI or RCA), HL, PVD, and AAA who on wednesday was doing some work around house and exacerbated her shoulder and continued to have discomfort there and went to an urgent care center Thursday to be evaluated and was told blood pressure was 250 systolic.  Apparently was sent home from there as it was thought that machine was broken.  Pt. herself rechecked blood pressure this AM and again it was >250 mmhg sysytolic.and came to be evaluated.  In ER /120.  Denies CP or SOB or current significant back pain, but approximately 3 weeks ago had severe back pain attributed to disk disease which resolved and says now only minimal (lower back).  Notes a history of Blood pressure differences in her arms (60mmhg).  IN ER she had a Head CT negative for acute finding. Received labetolol in ER with improvement to  /60 HR 70s. Denies wheezing, cough, phlegm or fever.  Denies orthopnea, PND, or LE edema.  Denies claudication as well.     7/8/17: Pt feels improved today. She denies chest pain or shortness of breath.          PAST MEDICAL & SURGICAL HISTORY:  MI (myocardial infarction)  CAD (coronary artery disease)  HTN (hypertension)  No significant past surgical history      SOCIAL HISTORY:    FAMILY HISTORY:  No pertinent family history in first degree relatives      ALLERGIES:  Allergies    predniSONE (Other (Moderate))    Intolerances        MEDICATIONS:    MEDICATIONS  (STANDING):  atorvastatin 80 milliGRAM(s) Oral at bedtime  clopidogrel Tablet 75 milliGRAM(s) Oral daily  heparin  Injectable 5000 Unit(s) SubCutaneous every 12 hours  nicotine - 21 mG/24Hr(s) Patch 1 patch Transdermal daily  losartan 50 milliGRAM(s) Oral two times a day  carvedilol 12.5 milliGRAM(s) Oral every 12 hours  amLODIPine   Tablet 2.5 milliGRAM(s) Oral two times a day  buDESOnide 160 MICROgram(s)/formoterol 4.5 MICROgram(s) Inhaler 2 Puff(s) Inhalation two times a day  famotidine    Tablet 20 milliGRAM(s) Oral at bedtime    MEDICATIONS  (PRN):  acetaminophen   Tablet. 650 milliGRAM(s) Oral every 6 hours PRN Mild Pain (1 - 3)        Vital Signs Last 24 Hrs  T(C): 36.8 (08 Jul 2017 05:35), Max: 37.1 (07 Jul 2017 10:42)  T(F): 98.3 (08 Jul 2017 05:35), Max: 98.7 (07 Jul 2017 10:42)  HR: 83 (08 Jul 2017 05:35) (67 - 83)  BP: 181/69 (08 Jul 2017 05:35) (157/60 - 255/78)  BP(mean): 95 (08 Jul 2017 05:35) (95 - 95)  RR: 17 (07 Jul 2017 17:36) (17 - 21)  SpO2: 94% (08 Jul 2017 05:35) (94% - 96%)Daily Height in cm: 157.48 (07 Jul 2017 10:35)    Daily I&O's Summary      PHYSICAL EXAM:    Constitutional: NAD, awake and alert, well-developed  HEENT: PERR, EOMI,  No oral cyananosis.  Neck:  supple,  No JVD  Respiratory: Breath sounds are clear bilaterally, No wheezing, rales or rhonchi  Cardiovascular: S1 and S2, regular rate and rhythm, no Murmurs, gallops or rubs  Gastrointestinal: Bowel Sounds present, soft, nontender.   Extremities: No peripheral edema. No clubbing or cyanosis.  Vascular: 2+ peripheral pulses  Neurological: A/O x 3, no focal deficits  Musculoskeletal: no calf tenderness.  Skin: No rashes.      LABS: All Labs Reviewed:                        11.7   8.1   )-----------( 266      ( 08 Jul 2017 04:09 )             35.1                         13.4   10.1  )-----------( 302      ( 07 Jul 2017 10:50 )             40.6     08 Jul 2017 04:09    138    |  104    |  15     ----------------------------<  93     3.9     |  27     |  0.64   07 Jul 2017 10:50    136    |  103    |  13     ----------------------------<  98     4.1     |  28     |  0.76     Ca    8.5        08 Jul 2017 04:09  Ca    9.1        07 Jul 2017 10:50  Phos  2.9       08 Jul 2017 04:09  Mg     2.0       08 Jul 2017 04:09    TPro  7.2    /  Alb  3.6    /  TBili  0.4    /  DBili  x      /  AST  17     /  ALT  26     /  AlkPhos  101    07 Jul 2017 10:50    PT/INR - ( 07 Jul 2017 10:50 )   PT: 12.1 sec;   INR: 1.12 ratio         PTT - ( 07 Jul 2017 10:50 )  PTT:31.5 sec  CARDIAC MARKERS ( 08 Jul 2017 04:09 )  <0.015 ng/mL / x     / 49 U/L / x     / x      CARDIAC MARKERS ( 07 Jul 2017 19:31 )  <0.015 ng/mL / x     / 55 U/L / x     / x      CARDIAC MARKERS ( 07 Jul 2017 10:50 )  <0.015 ng/mL / x     / 60 U/L / x     / x          Blood Culture:   07-07 @ 10:50  Pro Bnp [id number]        RADIOLOGY/EKG:< from: 12 Lead ECG (07.07.17 @ 10:39) >  Diagnosis Line Normal sinus rhythm  Possible Left atrial enlargement  Possible Inferior infarct , age undetermined  Cannot rule out Anterior infarct , age undetermined  ST & T wave abnormality, consider lateral ischemia  Abnormal ECG    Confirmed by Betito Porter MD ([id number]) on 7/7/2017 6:07:40 PM    < end of copied text >        ECHO/CARDIAC CATHTERIZATION/STRESS TEST:

## 2017-07-08 NOTE — PROGRESS NOTE ADULT - SUBJECTIVE AND OBJECTIVE BOX
65 y/o female with HTN, CAD s/p PCI, HL and remote h/o PUD and C. diff presents with /120.  Denies CP or SOB or Back pain.  HCT negative for acute finding.  CXR--no infiltrates/normal mediastinum EKG--NSR with inverted T in lat leads. Pt was given labetalol 10/10/20 IV.  Current /60 HR 70s  On my exam, awake and alert, NAD, denies CP or SOB, Back pain.  C/O L shoulder pain secondary to musculoskeletal pain. Trop negative X 1        07/08/17: Patient seen and examined. Feels better today. Blood pressure improved.      Vital Signs Last 24 Hrs  T(C): 37.1 (08 Jul 2017 12:21), Max: 37.1 (08 Jul 2017 12:21)  T(F): 98.8 (08 Jul 2017 12:21), Max: 98.8 (08 Jul 2017 12:21)  HR: 78 (08 Jul 2017 12:21) (67 - 83)  BP: 154/62 (08 Jul 2017 12:21) (149/55 - 214/68)  BP(mean): 95 (08 Jul 2017 05:35) (95 - 95)  RR: 14 (08 Jul 2017 12:21) (14 - 21)  SpO2: 95% (08 Jul 2017 12:21) (93% - 96%)          Physical Exam:  · Constitutional	Well-developed, well nourished	  · Respiratory	Breath Sounds equal & clear to percussion & auscultation, no accessory muscle use	  · Cardiovascular	Regular rate & rhythm, normal S1, S2; no murmurs, gallops or rubs; no S3, S4	  · Gastrointestinal	Soft, non-tender, no hepatosplenomegaly, normal bowel sounds	  · Vascular	Equal and normal pulses (carotid, femoral, dorsalis pedis)	  · Neurological	Alert & oriented; no sensory, motor or coordination deficits, normal reflexes	  · Musculoskeletal	No joint pain, swelling or deformity; no limitation of movement	          MEDICATIONS  (STANDING):  atorvastatin 80 milliGRAM(s) Oral at bedtime  clopidogrel Tablet 75 milliGRAM(s) Oral daily  heparin  Injectable 5000 Unit(s) SubCutaneous every 12 hours  nicotine - 21 mG/24Hr(s) Patch 1 patch Transdermal daily  losartan 50 milliGRAM(s) Oral two times a day  carvedilol 12.5 milliGRAM(s) Oral every 12 hours  buDESOnide 160 MICROgram(s)/formoterol 4.5 MICROgram(s) Inhaler 2 Puff(s) Inhalation two times a day  famotidine    Tablet 20 milliGRAM(s) Oral at bedtime  amLODIPine   Tablet 5 milliGRAM(s) Oral every 12 hours  aspirin enteric coated 81 milliGRAM(s) Oral daily    MEDICATIONS  (PRN):  acetaminophen   Tablet. 650 milliGRAM(s) Oral every 6 hours PRN Mild Pain (1 - 3)            Assessment and Plan:   Assessment:  · Assessment		  IMP:    65 y/o female with HTN, CAD s/p PCI, HL and remote h/o PUD and C. diff presents with hypertensive urgency    1. Hypertensive urgency-  Improving BP  Continue coreg, cozaar.  Norvasc dose was increased today.  Dr Luna consult appreciated.  CT head was negative for any acute changes.   watch for another day.    2. CAD S/P stent-  Continue aspirin, plavix, lipitor    3. Hyperlipidemia-continue lipitor.    4. H/O PUD-continue H2 blocker.    5. DVT prophylaxis.      Possible d/c tomorrow

## 2017-07-09 ENCOUNTER — TRANSCRIPTION ENCOUNTER (OUTPATIENT)
Age: 77
End: 2017-07-09

## 2017-07-09 VITALS
SYSTOLIC BLOOD PRESSURE: 110 MMHG | RESPIRATION RATE: 16 BRPM | HEART RATE: 68 BPM | OXYGEN SATURATION: 96 % | DIASTOLIC BLOOD PRESSURE: 52 MMHG | TEMPERATURE: 98 F

## 2017-07-09 PROCEDURE — 93010 ELECTROCARDIOGRAM REPORT: CPT

## 2017-07-09 RX ORDER — AMLODIPINE BESYLATE 2.5 MG/1
1 TABLET ORAL
Qty: 60 | Refills: 0 | OUTPATIENT
Start: 2017-07-09 | End: 2017-08-08

## 2017-07-09 RX ORDER — ALBUTEROL 90 UG/1
2.5 AEROSOL, METERED ORAL ONCE
Qty: 0 | Refills: 0 | Status: COMPLETED | OUTPATIENT
Start: 2017-07-09 | End: 2017-07-09

## 2017-07-09 RX ORDER — ALBUTEROL 90 UG/1
2.5 AEROSOL, METERED ORAL EVERY 6 HOURS
Qty: 0 | Refills: 0 | Status: DISCONTINUED | OUTPATIENT
Start: 2017-07-09 | End: 2017-07-09

## 2017-07-09 RX ADMIN — CLOPIDOGREL BISULFATE 75 MILLIGRAM(S): 75 TABLET, FILM COATED ORAL at 11:06

## 2017-07-09 RX ADMIN — BUDESONIDE AND FORMOTEROL FUMARATE DIHYDRATE 2 PUFF(S): 160; 4.5 AEROSOL RESPIRATORY (INHALATION) at 08:58

## 2017-07-09 RX ADMIN — LOSARTAN POTASSIUM 50 MILLIGRAM(S): 100 TABLET, FILM COATED ORAL at 05:46

## 2017-07-09 RX ADMIN — ALBUTEROL 2.5 MILLIGRAM(S): 90 AEROSOL, METERED ORAL at 03:07

## 2017-07-09 RX ADMIN — Medication 81 MILLIGRAM(S): at 11:06

## 2017-07-09 RX ADMIN — CARVEDILOL PHOSPHATE 12.5 MILLIGRAM(S): 80 CAPSULE, EXTENDED RELEASE ORAL at 05:46

## 2017-07-09 RX ADMIN — AMLODIPINE BESYLATE 5 MILLIGRAM(S): 2.5 TABLET ORAL at 05:46

## 2017-07-09 NOTE — CHART NOTE - NSCHARTNOTEFT_GEN_A_CORE
RN called for episode of SOB.    Pt seen and examined bedside.  Pt laying comfortably in bed with albuterol treatment going, reported feeling better. Denied any further SOB, denied chest pain. Breathing improved w/ albuterol treatment.    Vital Signs Last 24 Hrs  T(C): 37 (08 Jul 2017 16:54), Max: 37.1 (08 Jul 2017 12:21)  T(F): 98.6 (08 Jul 2017 16:54), Max: 98.8 (08 Jul 2017 12:21)  HR: 76 (09 Jul 2017 02:48) (73 - 84)  BP: 163/58 (09 Jul 2017 02:48) (149/55 - 197/83)  BP(mean): 111 (08 Jul 2017 16:54) (95 - 111)  RR: 16 (08 Jul 2017 16:54) (14 - 16)  SpO2: 98% (08 Jul 2017 20:56) (93% - 98%)    Physical exam:  Gen: AOx3, NAD  Resp: non-labored breathing, no cyanosis. Non-tachypneic. Good air entry b/l.  Cardio: +s1/s2    A/p:    #SOB  continue monitoring patient  f/u w/ primary team if needs albuterol or duoneb PRN  -no intervention at this time as patient stable and feeling well    Discussed w/ Dr. Dougherty PGY2

## 2017-07-09 NOTE — DISCHARGE NOTE ADULT - PATIENT PORTAL LINK FT
“You can access the [employer] Patient Portal, offered by [facility], by registering with the following website: [email or web address]”

## 2017-07-09 NOTE — DISCHARGE NOTE ADULT - HOSPITAL COURSE
65 y/o female with HTN, CAD s/p PCI, HL and remote h/o PUD and C. diff presents with /120.  Denies CP or SOB or Back pain.  HCT negative for acute finding.  CXR--no infiltrates/normal mediastinum EKG--NSR with inverted T in lat leads. Pt was given labetalol 10/10/20 IV.  Current /60 HR 70s  On my exam, awake and alert, NAD, denies CP or SOB, Back pain.  C/O L shoulder pain secondary to musculoskeletal pain. Trop negative X 1    Patient seen and examined. Feels much better today. BP improved. Discussed with patient regarding d/c plan.      Vital Signs Last 24 Hrs  T(C): 36.8 (09 Jul 2017 11:02), Max: 37 (08 Jul 2017 16:54)  T(F): 98.3 (09 Jul 2017 11:02), Max: 98.6 (08 Jul 2017 16:54)  HR: 68 (09 Jul 2017 11:02) (68 - 84)  BP: 110/52 (09 Jul 2017 11:02) (110/52 - 197/83)  BP(mean): 111 (08 Jul 2017 16:54) (111 - 111)  RR: 16 (09 Jul 2017 11:02) (16 - 16)  SpO2: 96% (09 Jul 2017 11:02) (93% - 98%)    Physical Exam:  · Constitutional	Well-developed, well nourished	  · Respiratory	Breath Sounds equal & clear to percussion & auscultation, no accessory muscle use	  · Cardiovascular	Regular rate & rhythm, normal S1, S2; no murmurs, gallops or rubs; no S3, S4	  · Gastrointestinal	Soft, non-tender, no hepatosplenomegaly, normal bowel sounds	  · Vascular	Equal and normal pulses (carotid, femoral, dorsalis pedis)	  · Neurological	Alert & oriented; no sensory, motor or coordination deficits, normal reflexes	  · Musculoskeletal	No joint pain, swelling or deformity; no limitation of movement	      3 y/o female with HTN, CAD s/p PCI, HL and remote h/o PUD and C. diff presents with hypertensive urgency    1. Hypertensive urgency-  Improved  Continue BB, cozaar.  Norvas   Dr Luna consult appreciated.  CT head was negative for any acute changes.   watch for another day.    2. CAD S/P stent-  Continue aspirin, plavix, lipitor    3. Hyperlipidemia-continue lipitor.    4. H/O PUD-continue H2 blocker.    5. D/c home today  Spent more than 30 minutes to prepare the discharge

## 2017-07-09 NOTE — CONSULT NOTE ADULT - ASSESSMENT
PROBLEMS;    Asthma  LLL nodular density  Hypertensive urgency  CAD S/P stent  Hyperlipidemia    PLAN;    aerosols  fu lung nodule as outpat  need fu CT Vs PET scan  DVT prophylasix

## 2017-07-09 NOTE — DISCHARGE NOTE ADULT - CARE PROVIDER_API CALL
Devaughn Obrien (MD), Cardiovascular Disease  [address] [place] [address]  Phone: [phone]  Fax: [phone]

## 2017-07-09 NOTE — DISCHARGE NOTE ADULT - CARE PLAN
Principal Discharge DX:	Hypertensive urgency  Goal:	Continue new medication norvasc  Instructions for follow-up, activity and diet:	Follow up with Dr Obrien in 1 week time

## 2017-07-09 NOTE — DISCHARGE NOTE ADULT - MEDICATION SUMMARY - MEDICATIONS TO TAKE
I will START or STAY ON the medications listed below when I get home from the hospital:    aspirin 81 mg oral tablet  -- 1 tab(s) by mouth once a day  -- Indication: For CAD (coronary artery disease)    Cozaar 25 mg oral tablet  -- 25 milligram(s) by mouth 2 times a day  -- Indication: For HTN (hypertension)    Lipitor 80 mg oral tablet  -- 1 tab(s) by mouth once a day  -- Indication: For CAD (coronary artery disease)    Plavix 75 mg oral tablet  -- 1 tab(s) by mouth once a day  -- Indication: For CAD (coronary artery disease)    Lopressor 50 mg oral tablet  -- 1 tab(s) by mouth 2 times a day  -- Indication: For HTN (hypertension)    Advair Diskus 250 mcg-50 mcg inhalation powder  -- 1 puff(s) inhaled 2 times a day  -- Indication: For CoPD    amLODIPine 5 mg oral tablet  -- 1 tab(s) by mouth every 12 hours  -- Indication: For HTN (hypertension)    Zantac 150 oral tablet  -- 1 tab(s) by mouth once a day (at bedtime)  -- Indication: For GERD    Probiotic Formula  --  by mouth   -- Indication: For supplement

## 2017-07-09 NOTE — CONSULT NOTE ADULT - SUBJECTIVE AND OBJECTIVE BOX
HPI:    pat seen & examine & full consult dictated.    PAST MEDICAL & SURGICAL HISTORY:  MI (myocardial infarction)  CAD (coronary artery disease)  HTN (hypertension)  No significant past surgical history      MEDICATIONS  (STANDING):  atorvastatin 80 milliGRAM(s) Oral at bedtime  clopidogrel Tablet 75 milliGRAM(s) Oral daily  heparin  Injectable 5000 Unit(s) SubCutaneous every 12 hours  nicotine - 21 mG/24Hr(s) Patch 1 patch Transdermal daily  losartan 50 milliGRAM(s) Oral two times a day  carvedilol 12.5 milliGRAM(s) Oral every 12 hours  buDESOnide 160 MICROgram(s)/formoterol 4.5 MICROgram(s) Inhaler 2 Puff(s) Inhalation two times a day  famotidine    Tablet 20 milliGRAM(s) Oral at bedtime  amLODIPine   Tablet 5 milliGRAM(s) Oral every 12 hours  aspirin enteric coated 81 milliGRAM(s) Oral daily    MEDICATIONS  (PRN):  acetaminophen   Tablet. 650 milliGRAM(s) Oral every 6 hours PRN Mild Pain (1 - 3)  ALBUTerol    0.083% 2.5 milliGRAM(s) Nebulizer every 6 hours PRN Shortness of Breath and/or Wheezing      Allergies    predniSONE (Other (Moderate))    Intolerances        SOCIAL HISTORY: Denies tobacco, etoh abuse or illicit drug use    FAMILY HISTORY:  No pertinent family history in first degree relatives      Vital Signs Last 24 Hrs  T(C): 36.8 (09 Jul 2017 11:02), Max: 37.1 (08 Jul 2017 12:21)  T(F): 98.3 (09 Jul 2017 11:02), Max: 98.8 (08 Jul 2017 12:21)  HR: 68 (09 Jul 2017 11:02) (68 - 84)  BP: 110/52 (09 Jul 2017 11:02) (110/52 - 197/83)  BP(mean): 111 (08 Jul 2017 16:54) (111 - 111)  RR: 16 (09 Jul 2017 11:02) (14 - 16)  SpO2: 96% (09 Jul 2017 11:02) (93% - 98%)    REVIEW OF SYSTEMS:    CONSTITUTIONAL:  As per HPI.  HEENT:  Eyes:  No diplopia or blurred vision. ENT:  No earache, sore throat or runny nose.  CARDIOVASCULAR:  No pressure, squeezing, tightness, heaviness or aching about the chest, neck, axilla or epigastrium.  RESPIRATORY:  No cough, shortness of breath, PND or orthopnea.  GASTROINTESTINAL:  No nausea, vomiting or diarrhea.  GENITOURINARY:  No dysuria, frequency or urgency.  MUSCULOSKELETAL:  As per HPI.  SKIN:  No change in skin, hair or nails.  NEUROLOGIC:  No paresthesias, fasciculations, seizures or weakness.  PSYCHIATRIC:  No disorder of thought or mood.  ENDOCRINE:  No heat or cold intolerance, polyuria or polydipsia.  HEMATOLOGICAL:  No easy bruising or bleedings:  .     PHYSICAL EXAMINATION:    GENERAL APPEARANCE:  Pt. is not currently dyspneic, in no distress. Pt. is alert, oriented, and pleasant.  HEENT:  Pupils are normal and react normally. No icterus. Mucous membranes well colored.  NECK:  Supple. No lymphadenopathy. Jugular venous pressure not elevated. Carotids equal.   HEART:   The cardiac impulse has a normal quality. Regular. Normal S1 and S2. There are no murmurs, rubs or gallops noted  CHEST:  Chest is clear to auscultation. Normal respiratory effort.  ABDOMEN:  Soft and nontender.   EXTREMITIES:  There is no cyanosis, clubbing or edema.   SKIN:  No rash or significant lesions are noted.    LABS:                        11.7   8.1   )-----------( 266      ( 08 Jul 2017 04:09 )             35.1     07-08    138  |  104  |  15  ----------------------------<  93  3.9   |  27  |  0.64    Ca    8.5      08 Jul 2017 04:09  Phos  2.9     07-08  Mg     2.0     07-08          CARDIAC MARKERS ( 08 Jul 2017 10:59 )  <0.015 ng/mL / x     / 49 U/L / x     / x      CARDIAC MARKERS ( 08 Jul 2017 04:09 )  <0.015 ng/mL / x     / 49 U/L / x     / x      CARDIAC MARKERS ( 07 Jul 2017 19:31 )  <0.015 ng/mL / x     / 55 U/L / x     / x        RADIOLOGY & ADDITIONAL STUDIES:     CT Angio Chest Aorta (07.07.17 @ 19:26) >  IMPRESSION:          1.2 cm spiculated nodular density in the superior segment left lower lobe   concerning for neoplasm until proven otherwise. Moderate to severe   centrilobular emphysema.    Mucous plugging of a subsegmental right lower lobe branch    No aortic aneurysm, aortic dissection or aortic stenosis. Extensive   atherosclerotic changes however no definite significant stenosis.  No   evidence of acute pulmonary or moles on.

## 2017-07-10 PROBLEM — I21.3 ST ELEVATION (STEMI) MYOCARDIAL INFARCTION OF UNSPECIFIED SITE: Chronic | Status: ACTIVE | Noted: 2017-07-07

## 2017-07-10 PROBLEM — I10 ESSENTIAL (PRIMARY) HYPERTENSION: Chronic | Status: ACTIVE | Noted: 2017-07-07

## 2017-07-10 PROBLEM — I25.10 ATHEROSCLEROTIC HEART DISEASE OF NATIVE CORONARY ARTERY WITHOUT ANGINA PECTORIS: Chronic | Status: ACTIVE | Noted: 2017-07-07

## 2017-07-12 DIAGNOSIS — I71.4 ABDOMINAL AORTIC ANEURYSM, WITHOUT RUPTURE: ICD-10-CM

## 2017-07-12 DIAGNOSIS — F17.210 NICOTINE DEPENDENCE, CIGARETTES, UNCOMPLICATED: ICD-10-CM

## 2017-07-12 DIAGNOSIS — I16.0 HYPERTENSIVE URGENCY: ICD-10-CM

## 2017-07-12 DIAGNOSIS — E78.5 HYPERLIPIDEMIA, UNSPECIFIED: ICD-10-CM

## 2017-07-12 DIAGNOSIS — I25.10 ATHEROSCLEROTIC HEART DISEASE OF NATIVE CORONARY ARTERY WITHOUT ANGINA PECTORIS: ICD-10-CM

## 2017-07-12 DIAGNOSIS — I25.2 OLD MYOCARDIAL INFARCTION: ICD-10-CM

## 2017-07-12 DIAGNOSIS — J45.909 UNSPECIFIED ASTHMA, UNCOMPLICATED: ICD-10-CM

## 2017-07-12 DIAGNOSIS — J43.9 EMPHYSEMA, UNSPECIFIED: ICD-10-CM

## 2017-07-12 DIAGNOSIS — Z87.11 PERSONAL HISTORY OF PEPTIC ULCER DISEASE: ICD-10-CM

## 2017-07-13 ENCOUNTER — APPOINTMENT (OUTPATIENT)
Dept: INTERNAL MEDICINE | Facility: CLINIC | Age: 77
End: 2017-07-13

## 2017-07-13 VITALS
DIASTOLIC BLOOD PRESSURE: 62 MMHG | HEIGHT: 62 IN | SYSTOLIC BLOOD PRESSURE: 110 MMHG | RESPIRATION RATE: 16 BRPM | HEART RATE: 80 BPM | WEIGHT: 110 LBS | BODY MASS INDEX: 20.24 KG/M2 | TEMPERATURE: 98.1 F | OXYGEN SATURATION: 96 %

## 2017-07-28 ENCOUNTER — APPOINTMENT (OUTPATIENT)
Dept: CARDIOLOGY | Facility: CLINIC | Age: 77
End: 2017-07-28
Payer: MEDICARE

## 2017-07-28 VITALS
HEIGHT: 62 IN | HEART RATE: 68 BPM | SYSTOLIC BLOOD PRESSURE: 120 MMHG | TEMPERATURE: 98.4 F | DIASTOLIC BLOOD PRESSURE: 60 MMHG | WEIGHT: 110 LBS | OXYGEN SATURATION: 97 % | BODY MASS INDEX: 20.24 KG/M2

## 2017-07-28 PROCEDURE — 99215 OFFICE O/P EST HI 40 MIN: CPT

## 2017-08-10 ENCOUNTER — MEDICATION RENEWAL (OUTPATIENT)
Age: 77
End: 2017-08-10

## 2017-08-14 ENCOUNTER — APPOINTMENT (OUTPATIENT)
Dept: INTERNAL MEDICINE | Facility: CLINIC | Age: 77
End: 2017-08-14

## 2017-09-21 ENCOUNTER — APPOINTMENT (OUTPATIENT)
Dept: CARDIOLOGY | Facility: CLINIC | Age: 77
End: 2017-09-21
Payer: MEDICARE

## 2017-09-21 VITALS
HEIGHT: 62 IN | HEART RATE: 68 BPM | SYSTOLIC BLOOD PRESSURE: 125 MMHG | OXYGEN SATURATION: 96 % | BODY MASS INDEX: 22.08 KG/M2 | DIASTOLIC BLOOD PRESSURE: 70 MMHG | WEIGHT: 120 LBS

## 2017-09-21 PROCEDURE — 99215 OFFICE O/P EST HI 40 MIN: CPT

## 2017-09-22 ENCOUNTER — APPOINTMENT (OUTPATIENT)
Dept: CARDIOLOGY | Facility: CLINIC | Age: 77
End: 2017-09-22
Payer: MEDICARE

## 2017-09-22 PROCEDURE — 78452 HT MUSCLE IMAGE SPECT MULT: CPT

## 2017-09-22 PROCEDURE — 93015 CV STRESS TEST SUPVJ I&R: CPT

## 2017-09-22 PROCEDURE — A9500: CPT

## 2017-10-10 ENCOUNTER — INPATIENT (INPATIENT)
Facility: HOSPITAL | Age: 77
LOS: 4 days | Discharge: HOME | End: 2017-10-15
Payer: MEDICARE

## 2017-10-10 VITALS
HEART RATE: 139 BPM | RESPIRATION RATE: 18 BRPM | TEMPERATURE: 99 F | DIASTOLIC BLOOD PRESSURE: 70 MMHG | SYSTOLIC BLOOD PRESSURE: 146 MMHG | OXYGEN SATURATION: 95 % | HEIGHT: 62 IN | WEIGHT: 119.93 LBS

## 2017-10-10 LAB
ALBUMIN SERPL ELPH-MCNC: 2.7 G/DL — LOW (ref 3.3–5)
ALP SERPL-CCNC: 113 U/L — SIGNIFICANT CHANGE UP (ref 40–120)
ALT FLD-CCNC: 31 U/L — SIGNIFICANT CHANGE UP (ref 12–78)
ANION GAP SERPL CALC-SCNC: 6 MMOL/L — SIGNIFICANT CHANGE UP (ref 5–17)
APTT BLD: 28.1 SEC — SIGNIFICANT CHANGE UP (ref 27.5–37.4)
AST SERPL-CCNC: 24 U/L — SIGNIFICANT CHANGE UP (ref 15–37)
BASOPHILS # BLD AUTO: 0.1 K/UL — SIGNIFICANT CHANGE UP (ref 0–0.2)
BASOPHILS NFR BLD AUTO: 0.8 % — SIGNIFICANT CHANGE UP (ref 0–2)
BILIRUB SERPL-MCNC: 0.5 MG/DL — SIGNIFICANT CHANGE UP (ref 0.2–1.2)
BUN SERPL-MCNC: 10 MG/DL — SIGNIFICANT CHANGE UP (ref 7–23)
CALCIUM SERPL-MCNC: 9 MG/DL — SIGNIFICANT CHANGE UP (ref 8.5–10.1)
CHLORIDE SERPL-SCNC: 102 MMOL/L — SIGNIFICANT CHANGE UP (ref 96–108)
CK SERPL-CCNC: 84 U/L — SIGNIFICANT CHANGE UP (ref 26–192)
CO2 SERPL-SCNC: 28 MMOL/L — SIGNIFICANT CHANGE UP (ref 22–31)
CREAT SERPL-MCNC: 0.64 MG/DL — SIGNIFICANT CHANGE UP (ref 0.5–1.3)
EOSINOPHIL # BLD AUTO: 0.2 K/UL — SIGNIFICANT CHANGE UP (ref 0–0.5)
EOSINOPHIL NFR BLD AUTO: 1.7 % — SIGNIFICANT CHANGE UP (ref 0–6)
GLUCOSE SERPL-MCNC: 101 MG/DL — HIGH (ref 70–99)
HCT VFR BLD CALC: 33 % — LOW (ref 34.5–45)
HGB BLD-MCNC: 10.8 G/DL — LOW (ref 11.5–15.5)
INR BLD: 1.2 RATIO — HIGH (ref 0.88–1.16)
LYMPHOCYTES # BLD AUTO: 1.1 K/UL — SIGNIFICANT CHANGE UP (ref 1–3.3)
LYMPHOCYTES # BLD AUTO: 10.3 % — LOW (ref 13–44)
MCHC RBC-ENTMCNC: 29.1 PG — SIGNIFICANT CHANGE UP (ref 27–34)
MCHC RBC-ENTMCNC: 32.8 GM/DL — SIGNIFICANT CHANGE UP (ref 32–36)
MCV RBC AUTO: 88.9 FL — SIGNIFICANT CHANGE UP (ref 80–100)
MONOCYTES # BLD AUTO: 1.5 K/UL — HIGH (ref 0–0.9)
MONOCYTES NFR BLD AUTO: 14 % — SIGNIFICANT CHANGE UP (ref 2–14)
NEUTROPHILS # BLD AUTO: 7.7 K/UL — HIGH (ref 1.8–7.4)
NEUTROPHILS NFR BLD AUTO: 73.3 % — SIGNIFICANT CHANGE UP (ref 43–77)
NT-PROBNP SERPL-SCNC: HIGH PG/ML (ref 0–450)
PLATELET # BLD AUTO: 360 K/UL — SIGNIFICANT CHANGE UP (ref 150–400)
POTASSIUM SERPL-MCNC: 4.2 MMOL/L — SIGNIFICANT CHANGE UP (ref 3.5–5.3)
POTASSIUM SERPL-SCNC: 4.2 MMOL/L — SIGNIFICANT CHANGE UP (ref 3.5–5.3)
PROT SERPL-MCNC: 7 GM/DL — SIGNIFICANT CHANGE UP (ref 6–8.3)
PROTHROM AB SERPL-ACNC: 13 SEC — HIGH (ref 9.8–12.7)
RBC # BLD: 3.71 M/UL — LOW (ref 3.8–5.2)
RBC # FLD: 14.1 % — SIGNIFICANT CHANGE UP (ref 10.3–14.5)
SODIUM SERPL-SCNC: 136 MMOL/L — SIGNIFICANT CHANGE UP (ref 135–145)
TROPONIN I SERPL-MCNC: 0.06 NG/ML — HIGH (ref 0.01–0.04)
TROPONIN I SERPL-MCNC: 0.06 NG/ML — HIGH (ref 0.01–0.04)
WBC # BLD: 10.6 K/UL — HIGH (ref 3.8–10.5)
WBC # FLD AUTO: 10.6 K/UL — HIGH (ref 3.8–10.5)

## 2017-10-10 PROCEDURE — 93010 ELECTROCARDIOGRAM REPORT: CPT

## 2017-10-10 PROCEDURE — 99223 1ST HOSP IP/OBS HIGH 75: CPT

## 2017-10-10 PROCEDURE — 71010: CPT | Mod: 26

## 2017-10-10 PROCEDURE — 99285 EMERGENCY DEPT VISIT HI MDM: CPT

## 2017-10-10 RX ORDER — INFLUENZA VIRUS VACCINE 15; 15; 15; 15 UG/.5ML; UG/.5ML; UG/.5ML; UG/.5ML
0.5 SUSPENSION INTRAMUSCULAR ONCE
Qty: 0 | Refills: 0 | Status: COMPLETED | OUTPATIENT
Start: 2017-10-10 | End: 2017-10-10

## 2017-10-10 RX ORDER — METOPROLOL TARTRATE 50 MG
75 TABLET ORAL
Qty: 0 | Refills: 0 | Status: DISCONTINUED | OUTPATIENT
Start: 2017-10-10 | End: 2017-10-14

## 2017-10-10 RX ORDER — SENNA PLUS 8.6 MG/1
2 TABLET ORAL AT BEDTIME
Qty: 0 | Refills: 0 | Status: DISCONTINUED | OUTPATIENT
Start: 2017-10-10 | End: 2017-10-15

## 2017-10-10 RX ORDER — APIXABAN 2.5 MG/1
2.5 TABLET, FILM COATED ORAL EVERY 12 HOURS
Qty: 0 | Refills: 0 | Status: DISCONTINUED | OUTPATIENT
Start: 2017-10-10 | End: 2017-10-15

## 2017-10-10 RX ORDER — ACETAMINOPHEN 500 MG
650 TABLET ORAL EVERY 6 HOURS
Qty: 0 | Refills: 0 | Status: DISCONTINUED | OUTPATIENT
Start: 2017-10-10 | End: 2017-10-13

## 2017-10-10 RX ORDER — FAMOTIDINE 10 MG/ML
20 INJECTION INTRAVENOUS AT BEDTIME
Qty: 0 | Refills: 0 | Status: DISCONTINUED | OUTPATIENT
Start: 2017-10-10 | End: 2017-10-11

## 2017-10-10 RX ORDER — DOCUSATE SODIUM 100 MG
100 CAPSULE ORAL THREE TIMES A DAY
Qty: 0 | Refills: 0 | Status: DISCONTINUED | OUTPATIENT
Start: 2017-10-10 | End: 2017-10-15

## 2017-10-10 RX ORDER — LOSARTAN POTASSIUM 100 MG/1
25 TABLET, FILM COATED ORAL DAILY
Qty: 0 | Refills: 0 | Status: DISCONTINUED | OUTPATIENT
Start: 2017-10-10 | End: 2017-10-14

## 2017-10-10 RX ORDER — ASPIRIN/CALCIUM CARB/MAGNESIUM 324 MG
81 TABLET ORAL DAILY
Qty: 0 | Refills: 0 | Status: DISCONTINUED | OUTPATIENT
Start: 2017-10-10 | End: 2017-10-15

## 2017-10-10 RX ORDER — L.ACIDOPH/B.ANIMALIS/B.LONGUM 15B CELL
0 CAPSULE ORAL
Qty: 0 | Refills: 0 | COMMUNITY

## 2017-10-10 RX ORDER — ONDANSETRON 8 MG/1
4 TABLET, FILM COATED ORAL EVERY 6 HOURS
Qty: 0 | Refills: 0 | Status: DISCONTINUED | OUTPATIENT
Start: 2017-10-10 | End: 2017-10-15

## 2017-10-10 RX ORDER — ATORVASTATIN CALCIUM 80 MG/1
40 TABLET, FILM COATED ORAL AT BEDTIME
Qty: 0 | Refills: 0 | Status: DISCONTINUED | OUTPATIENT
Start: 2017-10-10 | End: 2017-10-15

## 2017-10-10 RX ORDER — BUDESONIDE AND FORMOTEROL FUMARATE DIHYDRATE 160; 4.5 UG/1; UG/1
2 AEROSOL RESPIRATORY (INHALATION)
Qty: 0 | Refills: 0 | Status: DISCONTINUED | OUTPATIENT
Start: 2017-10-10 | End: 2017-10-15

## 2017-10-10 RX ORDER — CLOPIDOGREL BISULFATE 75 MG/1
1 TABLET, FILM COATED ORAL
Qty: 0 | Refills: 0 | COMMUNITY

## 2017-10-10 RX ORDER — RANITIDINE HYDROCHLORIDE 150 MG/1
1 TABLET, FILM COATED ORAL
Qty: 0 | Refills: 0 | COMMUNITY

## 2017-10-10 RX ORDER — GABAPENTIN 400 MG/1
300 CAPSULE ORAL
Qty: 0 | Refills: 0 | Status: DISCONTINUED | OUTPATIENT
Start: 2017-10-10 | End: 2017-10-12

## 2017-10-10 RX ORDER — SODIUM CHLORIDE 9 MG/ML
3 INJECTION INTRAMUSCULAR; INTRAVENOUS; SUBCUTANEOUS ONCE
Qty: 0 | Refills: 0 | Status: COMPLETED | OUTPATIENT
Start: 2017-10-10 | End: 2017-10-10

## 2017-10-10 RX ADMIN — SODIUM CHLORIDE 3 MILLILITER(S): 9 INJECTION INTRAMUSCULAR; INTRAVENOUS; SUBCUTANEOUS at 09:03

## 2017-10-10 RX ADMIN — ATORVASTATIN CALCIUM 40 MILLIGRAM(S): 80 TABLET, FILM COATED ORAL at 21:32

## 2017-10-10 RX ADMIN — Medication 650 MILLIGRAM(S): at 22:09

## 2017-10-10 RX ADMIN — LOSARTAN POTASSIUM 25 MILLIGRAM(S): 100 TABLET, FILM COATED ORAL at 18:52

## 2017-10-10 RX ADMIN — Medication 1 TABLET(S): at 18:52

## 2017-10-10 RX ADMIN — Medication 75 MILLIGRAM(S): at 18:50

## 2017-10-10 RX ADMIN — APIXABAN 2.5 MILLIGRAM(S): 2.5 TABLET, FILM COATED ORAL at 21:32

## 2017-10-10 NOTE — H&P ADULT - HISTORY OF PRESENT ILLNESS
Patient is 76yo female with PMHx of HTN, CAD with stents in 2008, Lung [place] [facility] [place] 6 days ago, presents with palpitations. Pt reports [place] [facility] she was in and out of Afib. Today patient took her HR, 140s, felt palpitations. Denies fever, chills, cough, chest pain, sob, HA, dizziness. No other constitutional symptoms. In the ER, patient was in NSR 80s. Seen by cardiology, Dr Obrien, plan to start A/C with Eliquis.

## 2017-10-10 NOTE — ED PROVIDER NOTE - PROGRESS NOTE DETAILS
spoke to dr doe, updated on patient, he will see in ED. - Dionicio Canas MD pt remains asymptomatic in ED. without episodes of a-fib. pt feels well. evaluated by dr doe, who would like pt admitted to medicine service, telemetry. dr doe to speak to hospitalist regarding patient. - Dionicio Canas MD

## 2017-10-10 NOTE — H&P ADULT - NSHPPHYSICALEXAM_GEN_ALL_CORE
T(C): 36.9 (10-10-17 @ 14:57), Max: 37 (10-10-17 @ 08:35)  HR: 90 (10-10-17 @ 14:57) (78 - 139)  BP: 161/60 (10-10-17 @ 14:57) (146/70 - 161/60)  RR: 17 (10-10-17 @ 14:57) (17 - 18)  SpO2: 95% (10-10-17 @ 14:57) (95% - 95%)  Wt(kg): --    Gen: AAOx3, NAD  HEENT: NCAT, EOMI  Neck: Supple  CV: nml S1S2, RRR  Lungs: CTABL, incision site clean, no visible oozing of blood  Abd: Soft, NT, ND, BS+  Ext: No edema  Neuro: Non focal

## 2017-10-10 NOTE — H&P ADULT - ASSESSMENT
78yo female a.w PAF    # PAF  - currently afebrile, HD stable, comfortable, in NSR,  - no evidence of bleeding from incision sites of VATS  - A/C as per cardiology, Eliquis 2.5mg bid  - Cont with Lopressor  - check TSH    # HTN  - Cont with home meds    # CAD  - cont with ASA, Lopressor, Statin  - Hold Plavix as per cardiology    # Hx of Lung Ca  - s/p VATS resection, follow up at [facility]    # DVT ppx, Eliquis    # Admit, stable

## 2017-10-10 NOTE — ED ADULT NURSE REASSESSMENT NOTE - NS ED NURSE REASSESS COMMENT FT1
patient sleeping at this time.  Family at bedside VS stable at this time.  Will continue to monitor patient condition.

## 2017-10-10 NOTE — ED PROVIDER NOTE - MEDICAL DECISION MAKING DETAILS
64F with pmh of paroxysmal afib not on a/c, with recent excision of lung mass at [facility] presents s/p episode of palpitations, felt like similar episodes of a-fib. asymptomatic on arrival to ED. without fever/chills, thoracic incisions appear CDI, with low suspicion of infection. chronic sob, unchanged, no cp, no LE swelling, calf ttp, hx PE/DVT, low suspicion of PE. will check labs including cbc eval for anemia, cmp eval renal function and electrolytes, troponin, cxr. EKG in ED shows sinus rhythm, unchanged from previous. low suspicion of acs. will contact dr doe, cardiologist. IVF. re-assess. - Dionicio Canas MD

## 2017-10-10 NOTE — ED ADULT TRIAGE NOTE - CHIEF COMPLAINT QUOTE
Patient comes to ED for rapid A-fib. pt felt SOB and fluttering in chest. pt had lung nodule removed on Wednesday, dressing to back is oozing

## 2017-10-10 NOTE — H&P ADULT - NSHPLABSRESULTS_GEN_ALL_CORE
CARDIAC MARKERS ( 10 Oct 2017 13:13 )  0.061 ng/mL / x     / x     / x     / x      CARDIAC MARKERS ( 10 Oct 2017 08:53 )  0.060 ng/mL / x     / 84 U/L / x     / x                                10.8   10.6  )-----------( 360      ( 10 Oct 2017 08:53 )             33.0     10 Oct 2017 08:53    136    |  102    |  10     ----------------------------<  101    4.2     |  28     |  0.64     Ca    9.0        10 Oct 2017 08:53    TPro  7.0    /  Alb  2.7    /  TBili  0.5    /  DBili  x      /  AST  24     /  ALT  31     /  AlkPhos  113    10 Oct 2017 08:53    PT/INR - ( 10 Oct 2017 08:53 )   PT: 13.0 sec;   INR: 1.20 ratio         PTT - ( 10 Oct 2017 08:53 )  PTT:28.1 sec  CAPILLARY BLOOD GLUCOSE        LIVER FUNCTIONS - ( 10 Oct 2017 08:53 )  Alb: 2.7 g/dL / Pro: 7.0 gm/dL / ALK PHOS: 113 U/L / ALT: 31 U/L / AST: 24 U/L / GGT: x    EKG: NSR, NSST changes

## 2017-10-10 NOTE — H&P ADULT - NSHPREVIEWOFSYSTEMS_GEN_ALL_CORE
(-)Fever, chills, cough, chest pain, sob, headache, dizziness, abd pain, n/v/d, leg swelling  (+) palpitations

## 2017-10-10 NOTE — ED PROVIDER NOTE - OBJECTIVE STATEMENT
65 y/o female with HTN, CAD s/p PCI, HL 65 y/o female with HTN, CAD s/p PCI and 2 stents, s/p L lung nodule removal by Dr Caldera ([facility]) 6 days ago presents with tachycardia, palpitations, "I went into a-fib." pt took bp, found that HR was in 140s, took asa, lopressor 75mg, cozaar, symptoms improved after ~1 hour. denies cp. has been experiencing SOB since lung surgery, unchanged. +cough. no f/c. no headache, dizziness, n/v/d. does feel tired and weak.     Cards: Micah 65 y/o female with HTN, CAD s/p PCI and 2 stents, s/p L lung nodule removal by Dr Caldera ([facility]) 6 days ago (cancerous but did not spread to lymph nodes) presents with tachycardia, palpitations, "I went into a-fib." pt took bp, found that HR was in 140s, took asa, lopressor 75mg, cozaar, symptoms improved after ~1 hour. denies cp. has been experiencing SOB since lung surgery, unchanged. +cough. no f/c. no headache, dizziness, n/v/d. does feel tired and weak.     Cards: Micah

## 2017-10-11 DIAGNOSIS — R71.0 PRECIPITOUS DROP IN HEMATOCRIT: ICD-10-CM

## 2017-10-11 DIAGNOSIS — I25.10 ATHEROSCLEROTIC HEART DISEASE OF NATIVE CORONARY ARTERY WITHOUT ANGINA PECTORIS: ICD-10-CM

## 2017-10-11 DIAGNOSIS — K27.9 PEPTIC ULCER, SITE UNSPECIFIED, UNSPECIFIED AS ACUTE OR CHRONIC, WITHOUT HEMORRHAGE OR PERFORATION: ICD-10-CM

## 2017-10-11 DIAGNOSIS — I10 ESSENTIAL (PRIMARY) HYPERTENSION: ICD-10-CM

## 2017-10-11 DIAGNOSIS — I48.0 PAROXYSMAL ATRIAL FIBRILLATION: ICD-10-CM

## 2017-10-11 DIAGNOSIS — D64.9 ANEMIA, UNSPECIFIED: ICD-10-CM

## 2017-10-11 LAB
ADD ON TEST-SPECIMEN IN LAB: SIGNIFICANT CHANGE UP
ANION GAP SERPL CALC-SCNC: 9 MMOL/L — SIGNIFICANT CHANGE UP (ref 5–17)
ANISOCYTOSIS BLD QL: SLIGHT — SIGNIFICANT CHANGE UP
BASOPHILS # BLD AUTO: 0.1 K/UL — SIGNIFICANT CHANGE UP (ref 0–0.2)
BASOPHILS NFR BLD AUTO: 1 % — SIGNIFICANT CHANGE UP (ref 0–2)
BUN SERPL-MCNC: 10 MG/DL — SIGNIFICANT CHANGE UP (ref 7–23)
BURR CELLS BLD QL SMEAR: PRESENT — SIGNIFICANT CHANGE UP
CALCIUM SERPL-MCNC: 8.5 MG/DL — SIGNIFICANT CHANGE UP (ref 8.5–10.1)
CHLORIDE SERPL-SCNC: 101 MMOL/L — SIGNIFICANT CHANGE UP (ref 96–108)
CO2 SERPL-SCNC: 24 MMOL/L — SIGNIFICANT CHANGE UP (ref 22–31)
CREAT SERPL-MCNC: 0.51 MG/DL — SIGNIFICANT CHANGE UP (ref 0.5–1.3)
ELLIPTOCYTES BLD QL SMEAR: SLIGHT — SIGNIFICANT CHANGE UP
EOSINOPHIL # BLD AUTO: 0.2 K/UL — SIGNIFICANT CHANGE UP (ref 0–0.5)
EOSINOPHIL NFR BLD AUTO: 2.2 % — SIGNIFICANT CHANGE UP (ref 0–6)
GLUCOSE SERPL-MCNC: 87 MG/DL — SIGNIFICANT CHANGE UP (ref 70–99)
HCT VFR BLD CALC: 30.4 % — LOW (ref 34.5–45)
HGB BLD-MCNC: 9.9 G/DL — LOW (ref 11.5–15.5)
INR BLD: 1.48 RATIO — HIGH (ref 0.88–1.16)
LYMPHOCYTES # BLD AUTO: 1 K/UL — SIGNIFICANT CHANGE UP (ref 1–3.3)
LYMPHOCYTES # BLD AUTO: 8.7 % — LOW (ref 13–44)
MACROCYTES BLD QL: SLIGHT — SIGNIFICANT CHANGE UP
MAGNESIUM SERPL-MCNC: 1.7 MG/DL — SIGNIFICANT CHANGE UP (ref 1.6–2.6)
MANUAL DIF COMMENT BLD-IMP: SIGNIFICANT CHANGE UP
MCHC RBC-ENTMCNC: 28.8 PG — SIGNIFICANT CHANGE UP (ref 27–34)
MCHC RBC-ENTMCNC: 32.6 GM/DL — SIGNIFICANT CHANGE UP (ref 32–36)
MCV RBC AUTO: 88.3 FL — SIGNIFICANT CHANGE UP (ref 80–100)
MONOCYTES # BLD AUTO: 1.6 K/UL — HIGH (ref 0–0.9)
MONOCYTES NFR BLD AUTO: 14.7 % — HIGH (ref 2–14)
NEUTROPHILS # BLD AUTO: 8.2 K/UL — HIGH (ref 1.8–7.4)
NEUTROPHILS NFR BLD AUTO: 73.4 % — SIGNIFICANT CHANGE UP (ref 43–77)
OVALOCYTES BLD QL SMEAR: SLIGHT — SIGNIFICANT CHANGE UP
PLAT MORPH BLD: NORMAL — SIGNIFICANT CHANGE UP
PLATELET # BLD AUTO: 342 K/UL — SIGNIFICANT CHANGE UP (ref 150–400)
POIKILOCYTOSIS BLD QL AUTO: SLIGHT — SIGNIFICANT CHANGE UP
POLYCHROMASIA BLD QL SMEAR: SLIGHT — SIGNIFICANT CHANGE UP
POTASSIUM SERPL-MCNC: 3.9 MMOL/L — SIGNIFICANT CHANGE UP (ref 3.5–5.3)
POTASSIUM SERPL-SCNC: 3.9 MMOL/L — SIGNIFICANT CHANGE UP (ref 3.5–5.3)
PROTHROM AB SERPL-ACNC: 16.1 SEC — HIGH (ref 9.8–12.7)
RBC # BLD: 3.45 M/UL — LOW (ref 3.8–5.2)
RBC # FLD: 14.5 % — SIGNIFICANT CHANGE UP (ref 10.3–14.5)
RBC BLD AUTO: (no result)
SCHISTOCYTES BLD QL AUTO: SLIGHT — SIGNIFICANT CHANGE UP
SODIUM SERPL-SCNC: 134 MMOL/L — LOW (ref 135–145)
WBC # BLD: 11.2 K/UL — HIGH (ref 3.8–10.5)
WBC # FLD AUTO: 11.2 K/UL — HIGH (ref 3.8–10.5)

## 2017-10-11 PROCEDURE — 99233 SBSQ HOSP IP/OBS HIGH 50: CPT

## 2017-10-11 RX ORDER — IPRATROPIUM/ALBUTEROL SULFATE 18-103MCG
3 AEROSOL WITH ADAPTER (GRAM) INHALATION ONCE
Qty: 0 | Refills: 0 | Status: COMPLETED | OUTPATIENT
Start: 2017-10-11 | End: 2017-10-11

## 2017-10-11 RX ORDER — POTASSIUM CHLORIDE 20 MEQ
40 PACKET (EA) ORAL ONCE
Qty: 0 | Refills: 0 | Status: COMPLETED | OUTPATIENT
Start: 2017-10-11 | End: 2017-10-11

## 2017-10-11 RX ORDER — MAGNESIUM SULFATE 500 MG/ML
1 VIAL (ML) INJECTION ONCE
Qty: 0 | Refills: 0 | Status: COMPLETED | OUTPATIENT
Start: 2017-10-11 | End: 2017-10-11

## 2017-10-11 RX ORDER — DILTIAZEM HCL 120 MG
180 CAPSULE, EXT RELEASE 24 HR ORAL DAILY
Qty: 0 | Refills: 0 | Status: DISCONTINUED | OUTPATIENT
Start: 2017-10-11 | End: 2017-10-15

## 2017-10-11 RX ORDER — IPRATROPIUM/ALBUTEROL SULFATE 18-103MCG
3 AEROSOL WITH ADAPTER (GRAM) INHALATION THREE TIMES A DAY
Qty: 0 | Refills: 0 | Status: DISCONTINUED | OUTPATIENT
Start: 2017-10-11 | End: 2017-10-15

## 2017-10-11 RX ORDER — LACTOBACILLUS ACIDOPHILUS 100MM CELL
1 CAPSULE ORAL DAILY
Qty: 0 | Refills: 0 | Status: DISCONTINUED | OUTPATIENT
Start: 2017-10-11 | End: 2017-10-15

## 2017-10-11 RX ORDER — PANTOPRAZOLE SODIUM 20 MG/1
40 TABLET, DELAYED RELEASE ORAL
Qty: 0 | Refills: 0 | Status: DISCONTINUED | OUTPATIENT
Start: 2017-10-11 | End: 2017-10-15

## 2017-10-11 RX ADMIN — Medication 180 MILLIGRAM(S): at 05:27

## 2017-10-11 RX ADMIN — Medication 81 MILLIGRAM(S): at 11:31

## 2017-10-11 RX ADMIN — APIXABAN 2.5 MILLIGRAM(S): 2.5 TABLET, FILM COATED ORAL at 17:34

## 2017-10-11 RX ADMIN — Medication 1 TABLET(S): at 09:29

## 2017-10-11 RX ADMIN — Medication 75 MILLIGRAM(S): at 17:34

## 2017-10-11 RX ADMIN — ATORVASTATIN CALCIUM 40 MILLIGRAM(S): 80 TABLET, FILM COATED ORAL at 22:06

## 2017-10-11 RX ADMIN — Medication 1 TABLET(S): at 11:31

## 2017-10-11 RX ADMIN — Medication 3 MILLILITER(S): at 19:39

## 2017-10-11 RX ADMIN — Medication 75 MILLIGRAM(S): at 05:28

## 2017-10-11 RX ADMIN — Medication 3 MILLILITER(S): at 13:56

## 2017-10-11 RX ADMIN — PANTOPRAZOLE SODIUM 40 MILLIGRAM(S): 20 TABLET, DELAYED RELEASE ORAL at 22:06

## 2017-10-11 RX ADMIN — Medication 3 MILLILITER(S): at 09:45

## 2017-10-11 RX ADMIN — LOSARTAN POTASSIUM 25 MILLIGRAM(S): 100 TABLET, FILM COATED ORAL at 05:29

## 2017-10-11 RX ADMIN — Medication 650 MILLIGRAM(S): at 11:35

## 2017-10-11 RX ADMIN — APIXABAN 2.5 MILLIGRAM(S): 2.5 TABLET, FILM COATED ORAL at 05:29

## 2017-10-11 RX ADMIN — FAMOTIDINE 20 MILLIGRAM(S): 10 INJECTION INTRAVENOUS at 05:29

## 2017-10-11 RX ADMIN — Medication 100 GRAM(S): at 20:30

## 2017-10-11 NOTE — CHART NOTE - NSCHARTNOTEFT_GEN_A_CORE
wound oozing fair amount of serosanguineous fluid this AM.  Will have thoracic surgery consulted to evaluate wound.

## 2017-10-11 NOTE — CONSULT NOTE ADULT - PROBLEM SELECTOR RECOMMENDATION 9
stool is heme negative  no evidence for active gi bleeding  follow H/H  for continued drop in H/H in pt who needs anticoagulation, pt may need EGD but presently no need for EGD
Add Cardizem for rate control and suppression. Add AC with eliquis 2.5mg BID.  Tele. Dc clopidogrel and start new regime of meds.

## 2017-10-11 NOTE — CONSULT NOTE ADULT - PROBLEM SELECTOR RECOMMENDATION 3
no symptoms for active PUD now  no evidence for active gi bleeding  pantoprazole 40 mg daily  with h/o c dif in past , continue probiotic daily      Dr. Ramirez to resume gi care in am.
Stable on current regime.

## 2017-10-11 NOTE — CONSULT NOTE ADULT - PROBLEM SELECTOR RECOMMENDATION 2
May be due to hydration in hospital with oozing from VATS site
Addition of Cardizem will hopeful with BP.

## 2017-10-12 DIAGNOSIS — C34.32 MALIGNANT NEOPLASM OF LOWER LOBE, LEFT BRONCHUS OR LUNG: ICD-10-CM

## 2017-10-12 LAB
ANION GAP SERPL CALC-SCNC: 8 MMOL/L — SIGNIFICANT CHANGE UP (ref 5–17)
BUN SERPL-MCNC: 9 MG/DL — SIGNIFICANT CHANGE UP (ref 7–23)
CALCIUM SERPL-MCNC: 8.3 MG/DL — LOW (ref 8.5–10.1)
CHLORIDE SERPL-SCNC: 101 MMOL/L — SIGNIFICANT CHANGE UP (ref 96–108)
CO2 SERPL-SCNC: 25 MMOL/L — SIGNIFICANT CHANGE UP (ref 22–31)
CREAT SERPL-MCNC: 0.53 MG/DL — SIGNIFICANT CHANGE UP (ref 0.5–1.3)
GLUCOSE SERPL-MCNC: 102 MG/DL — HIGH (ref 70–99)
HCT VFR BLD CALC: 29.4 % — LOW (ref 34.5–45)
HGB BLD-MCNC: 9.5 G/DL — LOW (ref 11.5–15.5)
MCHC RBC-ENTMCNC: 28.7 PG — SIGNIFICANT CHANGE UP (ref 27–34)
MCHC RBC-ENTMCNC: 32.4 GM/DL — SIGNIFICANT CHANGE UP (ref 32–36)
MCV RBC AUTO: 88.8 FL — SIGNIFICANT CHANGE UP (ref 80–100)
PLATELET # BLD AUTO: 324 K/UL — SIGNIFICANT CHANGE UP (ref 150–400)
POTASSIUM SERPL-MCNC: 4.1 MMOL/L — SIGNIFICANT CHANGE UP (ref 3.5–5.3)
POTASSIUM SERPL-SCNC: 4.1 MMOL/L — SIGNIFICANT CHANGE UP (ref 3.5–5.3)
RBC # BLD: 3.31 M/UL — LOW (ref 3.8–5.2)
RBC # FLD: 14.7 % — HIGH (ref 10.3–14.5)
SODIUM SERPL-SCNC: 134 MMOL/L — LOW (ref 135–145)
TSH SERPL-MCNC: 2.5 UIU/ML — SIGNIFICANT CHANGE UP (ref 0.36–3.74)
WBC # BLD: 11 K/UL — HIGH (ref 3.8–10.5)
WBC # FLD AUTO: 11 K/UL — HIGH (ref 3.8–10.5)

## 2017-10-12 PROCEDURE — 99222 1ST HOSP IP/OBS MODERATE 55: CPT

## 2017-10-12 RX ORDER — METOPROLOL TARTRATE 50 MG
2.5 TABLET ORAL ONCE
Qty: 0 | Refills: 0 | Status: COMPLETED | OUTPATIENT
Start: 2017-10-12 | End: 2017-10-12

## 2017-10-12 RX ADMIN — Medication 75 MILLIGRAM(S): at 05:34

## 2017-10-12 RX ADMIN — Medication 650 MILLIGRAM(S): at 10:56

## 2017-10-12 RX ADMIN — Medication 3 MILLILITER(S): at 19:28

## 2017-10-12 RX ADMIN — Medication 180 MILLIGRAM(S): at 05:34

## 2017-10-12 RX ADMIN — APIXABAN 2.5 MILLIGRAM(S): 2.5 TABLET, FILM COATED ORAL at 17:04

## 2017-10-12 RX ADMIN — Medication 1 TABLET(S): at 10:56

## 2017-10-12 RX ADMIN — Medication 650 MILLIGRAM(S): at 03:46

## 2017-10-12 RX ADMIN — Medication 75 MILLIGRAM(S): at 17:04

## 2017-10-12 RX ADMIN — Medication 2.5 MILLIGRAM(S): at 02:10

## 2017-10-12 RX ADMIN — Medication 2.5 MILLIGRAM(S): at 04:05

## 2017-10-12 RX ADMIN — ATORVASTATIN CALCIUM 40 MILLIGRAM(S): 80 TABLET, FILM COATED ORAL at 21:22

## 2017-10-12 RX ADMIN — Medication 650 MILLIGRAM(S): at 17:05

## 2017-10-12 RX ADMIN — Medication 81 MILLIGRAM(S): at 10:56

## 2017-10-12 RX ADMIN — Medication 3 MILLILITER(S): at 08:03

## 2017-10-12 RX ADMIN — LOSARTAN POTASSIUM 25 MILLIGRAM(S): 100 TABLET, FILM COATED ORAL at 05:35

## 2017-10-12 RX ADMIN — APIXABAN 2.5 MILLIGRAM(S): 2.5 TABLET, FILM COATED ORAL at 05:34

## 2017-10-12 RX ADMIN — Medication 100 MILLIGRAM(S): at 21:22

## 2017-10-12 RX ADMIN — PANTOPRAZOLE SODIUM 40 MILLIGRAM(S): 20 TABLET, DELAYED RELEASE ORAL at 06:36

## 2017-10-12 NOTE — CONSULT NOTE ADULT - PROBLEM SELECTOR PROBLEM 3
PUD (peptic ulcer disease)
Anemia, unspecified type
Coronary artery disease involving native coronary artery of native heart without angina pectoris

## 2017-10-13 LAB
ANION GAP SERPL CALC-SCNC: 9 MMOL/L — SIGNIFICANT CHANGE UP (ref 5–17)
BUN SERPL-MCNC: 10 MG/DL — SIGNIFICANT CHANGE UP (ref 7–23)
CALCIUM SERPL-MCNC: 8.1 MG/DL — LOW (ref 8.5–10.1)
CHLORIDE SERPL-SCNC: 102 MMOL/L — SIGNIFICANT CHANGE UP (ref 96–108)
CO2 SERPL-SCNC: 25 MMOL/L — SIGNIFICANT CHANGE UP (ref 22–31)
CREAT SERPL-MCNC: 0.54 MG/DL — SIGNIFICANT CHANGE UP (ref 0.5–1.3)
GLUCOSE SERPL-MCNC: 96 MG/DL — SIGNIFICANT CHANGE UP (ref 70–99)
HCT VFR BLD CALC: 29.1 % — LOW (ref 34.5–45)
HGB BLD-MCNC: 9.5 G/DL — LOW (ref 11.5–15.5)
MCHC RBC-ENTMCNC: 29 PG — SIGNIFICANT CHANGE UP (ref 27–34)
MCHC RBC-ENTMCNC: 32.7 GM/DL — SIGNIFICANT CHANGE UP (ref 32–36)
MCV RBC AUTO: 88.6 FL — SIGNIFICANT CHANGE UP (ref 80–100)
PLATELET # BLD AUTO: 346 K/UL — SIGNIFICANT CHANGE UP (ref 150–400)
POTASSIUM SERPL-MCNC: 3.7 MMOL/L — SIGNIFICANT CHANGE UP (ref 3.5–5.3)
POTASSIUM SERPL-SCNC: 3.7 MMOL/L — SIGNIFICANT CHANGE UP (ref 3.5–5.3)
RBC # BLD: 3.28 M/UL — LOW (ref 3.8–5.2)
RBC # FLD: 14.7 % — HIGH (ref 10.3–14.5)
SODIUM SERPL-SCNC: 136 MMOL/L — SIGNIFICANT CHANGE UP (ref 135–145)
TROPONIN I SERPL-MCNC: <0.015 NG/ML — SIGNIFICANT CHANGE UP (ref 0.01–0.04)
WBC # BLD: 11.1 K/UL — HIGH (ref 3.8–10.5)
WBC # FLD AUTO: 11.1 K/UL — HIGH (ref 3.8–10.5)

## 2017-10-13 PROCEDURE — 93010 ELECTROCARDIOGRAM REPORT: CPT | Mod: 76

## 2017-10-13 PROCEDURE — 99233 SBSQ HOSP IP/OBS HIGH 50: CPT

## 2017-10-13 PROCEDURE — 99223 1ST HOSP IP/OBS HIGH 75: CPT

## 2017-10-13 RX ORDER — METOPROLOL TARTRATE 50 MG
5 TABLET ORAL ONCE
Qty: 0 | Refills: 0 | Status: COMPLETED | OUTPATIENT
Start: 2017-10-13 | End: 2017-10-13

## 2017-10-13 RX ORDER — ACETAMINOPHEN 500 MG
650 TABLET ORAL EVERY 6 HOURS
Qty: 0 | Refills: 0 | Status: DISCONTINUED | OUTPATIENT
Start: 2017-10-13 | End: 2017-10-15

## 2017-10-13 RX ADMIN — Medication 81 MILLIGRAM(S): at 12:23

## 2017-10-13 RX ADMIN — Medication 180 MILLIGRAM(S): at 05:30

## 2017-10-13 RX ADMIN — Medication 3 MILLILITER(S): at 20:35

## 2017-10-13 RX ADMIN — Medication 3 MILLILITER(S): at 14:03

## 2017-10-13 RX ADMIN — Medication 1 TABLET(S): at 12:23

## 2017-10-13 RX ADMIN — APIXABAN 2.5 MILLIGRAM(S): 2.5 TABLET, FILM COATED ORAL at 17:22

## 2017-10-13 RX ADMIN — PANTOPRAZOLE SODIUM 40 MILLIGRAM(S): 20 TABLET, DELAYED RELEASE ORAL at 05:30

## 2017-10-13 RX ADMIN — Medication 5 MILLIGRAM(S): at 22:34

## 2017-10-13 RX ADMIN — Medication 75 MILLIGRAM(S): at 05:31

## 2017-10-13 RX ADMIN — Medication 650 MILLIGRAM(S): at 23:51

## 2017-10-13 RX ADMIN — Medication 650 MILLIGRAM(S): at 17:22

## 2017-10-13 RX ADMIN — APIXABAN 2.5 MILLIGRAM(S): 2.5 TABLET, FILM COATED ORAL at 05:31

## 2017-10-13 RX ADMIN — ATORVASTATIN CALCIUM 40 MILLIGRAM(S): 80 TABLET, FILM COATED ORAL at 22:34

## 2017-10-13 RX ADMIN — Medication 650 MILLIGRAM(S): at 09:19

## 2017-10-13 RX ADMIN — Medication 100 MILLIGRAM(S): at 05:30

## 2017-10-13 RX ADMIN — Medication 650 MILLIGRAM(S): at 01:17

## 2017-10-13 RX ADMIN — LOSARTAN POTASSIUM 25 MILLIGRAM(S): 100 TABLET, FILM COATED ORAL at 05:30

## 2017-10-13 NOTE — CONSULT NOTE ADULT - ASSESSMENT
78yo female with PMHx of COPD, HTN, CAD, prior inferior wall MI with SOCRATES stents in 2008 maintained on plavix, Carotid disease, Lung nodule s/p resection at [facility] Dr Caldera 6 days ago admitted with post op afib and accelerated HTN.   Plavix was discontinued and pt begun on eliquis.   In hospital pt has had some oozing from VATS site. Hgb dropped from 10.8 to 9.9.  Pt denies melena or hematochezia.  Pt has no gi symptoms.  BUN is not elevated and stool heme negative.  NO evidence for active gi bleeding and no symptoms to suggest active PUD.     Given h/o bleeding PUD and with pt now on anticoagulation and aspirin, would treat with PPI prophylaxis as studies show that PPI prophylaxis does prevent ASA/NSAID induced PUD.  Pt does have h/o C dif however risk of PPI use in possibly increasing propensity for C dif is outweighed by benefit in this pt with h/o UGI bleed due to DU.
Patient is 78yo female with PMHx of HTN,cdiff in past after appendectomy, CAD with stents in 2008, Lung nodule s/p resection at [facility] one week ago, complicated by afib now admitted on 10/10 for evaluation of palpitations found to be in afib. Of note the patient has mild erythema of skin around left incision chest wall, no fever or chills, no other complaints.  1. Mild leukocytosis, mild ?infiltrates on imaging most likely post op in nature  - doubt pneumonia and no evidence of cellulitis on physical exam  - follow up cultures   - serial cbc and monitor temperature   - oxygen and nebs as needed   - would opt to observe off antibiotics given history of cdiff in past, patient had difficult time clearing cdiff  2. other issues: hypertension, new afib  - per medicine
patient s/p recent LLL wedge resection for stage 1A NSCL Ca  paroxysmal afib - cardio f/u noted  avoid beta agonists now if possible  to start anticoag
recent left lung surgery at [facility], drainage from left VATS chest tube site resolved.  1. small allevyn dressing placed at old chest tube site,  may remove tomorrow.  2. wound with steri strips can stay open to air.  3. may shower in 2 days, allow steri strips to fall off.  4. keep appt with original surgeon upon discharge.  5. okay from thoracic standpoint for discharge home.  Case discussed with Dr. Morrow

## 2017-10-13 NOTE — CONSULT NOTE ADULT - SUBJECTIVE AND OBJECTIVE BOX
HPI:  76yo female with PMHx of COPD, HTN, CAD, prior inferior wall MI with SOCRATES stents in  maintained on plavix, Carotid disease, Lung nodule s/p resection at [facility] [place] Dr Caldera 6 days ago, Post op paroxysmal atrial fibrillation, who awoke day of admission with recurrent palpitations. Pt was discharged from [facility] [place] day PTA and then day of admission awoke with palpitations and   rapid heart beat.  Pt took pulse and blood pressure at home and they very high so she came in to be evaluated. Denies fever, chills, cough, chest pain, sob, HA, dizziness. No other constitutional symptoms. In the ER, patient was in NSR 80s.   Plavix was d/c'ed on admission and pt begun on eliquis.  Pt has h/o UGI bleed in  due to large bleeding DU requiring epi and cautery for active bleed.  She has been maintained on ranitidine.   In hospital pt has had some oozing from VATS site. Hgb dropped from 10.8 to 9.9.  Pt denies melena or hematochezia.  She was constipated for 5 days after VATS but had a bm and feels well.  Pt denies abd pain, epig burning, heartburn, nausea, vomiting, diarrhea.     PAST MEDICAL & SURGICAL HISTORY:  MI (myocardial infarction)  CAD (coronary artery disease)  RCA SOCRATES  HTN (hypertension)  hyperlipidemia  COPD  carotid stenosis  osteoporosis  kidney stone  C dif in   UGI bleed due to DU in     [facility]  lung surgery as noted above    MEDICATIONS  (STANDING):  ALBUTerol/ipratropium for Nebulization 3 milliLiter(s) Nebulizer three times a day  apixaban 2.5 milliGRAM(s) Oral every 12 hours  aspirin  chewable 81 milliGRAM(s) Oral daily  atorvastatin 40 milliGRAM(s) Oral at bedtime  buDESOnide 160 MICROgram(s)/formoterol 4.5 MICROgram(s) Inhaler 2 Puff(s) Inhalation two times a day  diltiazem    milliGRAM(s) Oral daily  docusate sodium 100 milliGRAM(s) Oral three times a day  famotidine    Tablet 20 milliGRAM(s) Oral at bedtime  gabapentin 300 milliGRAM(s) Oral two times a day  influenza   Vaccine 0.5 milliLiter(s) IntraMuscular once  lactobacillus acidophilus 1 Tablet(s) Oral daily  losartan 25 milliGRAM(s) Oral daily  magnesium sulfate  IVPB 1 Gram(s) IV Intermittent once  metoprolol 75 milliGRAM(s) Oral two times a day  multivitamin 1 Tablet(s) Oral daily    MEDICATIONS  (PRN):  acetaminophen   Tablet 650 milliGRAM(s) Oral every 6 hours PRN For Temp greater than 38 C (100.4 F)  ondansetron Injectable 4 milliGRAM(s) IV Push every 6 hours PRN Nausea  senna 2 Tablet(s) Oral at bedtime PRN Constipation      Allergies    predniSONE (Other (Moderate))      Social History:    smokin pack year history, quit one month ago    Alcohol: no     FAMILY HISTORY:  father CVA  mother  at 93      REVIEW OF SYSTEMS:    CONSTITUTIONAL: No weakness, fevers or chills  EYES/ENT: No visual changes, no vertigo , no sore throat  NECK: No pain or stiffness  RESPIRATORY: +cough, no wheezing, no hemoptysis, no shortness of breath, no sputum production  CARDIOVASCULAR: No chest pain ,no palpitations, no orthopnea, no paroxysmal nocturnal dyspnea  GASTROINTESTINAL: see HPI  GENITOURINARY: No dysuria, no increased frequency, no hematuria  HEME: no bruising or bleeding  NEUROLOGICAL: No numbness or weakness, no focal weakness, no headaches  SKIN: No itching, no rashes, no lesions   All other review of systems is negative unless indicated above.    Physical Exam:  Vital Signs Last 24 Hrs  T(C): 36.9 (11 Oct 2017 16:35), Max: 37.1 (11 Oct 2017 04:28)  T(F): 98.5 (11 Oct 2017 16:35), Max: 98.7 (11 Oct 2017 04:28)  HR: 89 (11 Oct 2017 16:35) (72 - 90)  BP: 115/79 (11 Oct 2017 16:35) (112/47 - 137/70)  BP(mean): --  RR: 18 (11 Oct 2017 16:35) (18 - 18)  SpO2: 91% (11 Oct 2017 16:35) (91% - 95%)  Physical Exam:  General: Well developed, well nourished patient in no acute distress  Skin: no rash or jaundice  Head: normocephalic, atraumatic  Eyes: anicteric, conjunctiva pink  Neck: supple, No JVD  Nodes: no adenopathy  Lungs: clear to auscultation and percussion bilaterally, no rales, no wheezes, no rhonchi  Back: no CVA tenderness  Chest: VATS dressing clean and dry  Cardiac: RRR, normal S1 S2, no murmurs, no rubs, no gallops  Abd: soft, normoactive BS, non distended, non-tender to palpation or percussion, no hepatosplenomegaly, no masses, no hernia  Rectal: no masses,  heme negative stool  Ext: no cyanosis, no clubbing, no edema  Neuro: alert/oriented x 3, non-focal      Labs:                        9.9    11.2  )-----------( 342      ( 11 Oct 2017 06:53 )             30.4   baso 1.0    eos 2.2    imm gran x      lymph 8.7    mono 14.7   poly 73.4                         10.8   10.6  )-----------( 360      ( 10 Oct 2017 08:53 )             33.0   baso 0.8    eos 1.7    imm gran x      lymph 10.3   mono 14.0   poly 73.3         11 Oct 2017 06:53    134    |  101    |  10     ----------------------------<  87     3.9     |  24     |  0.51     10 Oct 2017 08:53    136    |  102    |  10     ----------------------------<  101    4.2     |  28     |  0.64     Ca    8.5        11 Oct 2017 06:53  Ca    9.0        10 Oct 2017 08:53    TPro  7.0    /  Alb  2.7    /  TBili  0.5    /  DBili  x      /  AST  24     /  ALT  31     /  AlkPhos  113    10 Oct 2017 08:53
Patient is a 77y old  Female who presents with a chief complaint of Palpitations (10 Oct 2017 16:47)    HPI:  Patient is 76yo female with PMHx of HTN,cdiff in past after appendectomy, CAD with stents in , [place] nodule [place] [facility] [place], complicated by afib now admitted on 10/10 for evaluation of palpitations found to be in afib. Of note the patient has mild erythema of skin around left incision chest wall, no fever or chills, no other complaints.           PMH: as above  PSH: as above  Meds: per reconcilation sheet, noted below  MEDICATIONS  (STANDING):  ALBUTerol/ipratropium for Nebulization 3 milliLiter(s) Nebulizer three times a day  apixaban 2.5 milliGRAM(s) Oral every 12 hours  aspirin  chewable 81 milliGRAM(s) Oral daily  atorvastatin 40 milliGRAM(s) Oral at bedtime  buDESOnide 160 MICROgram(s)/formoterol 4.5 MICROgram(s) Inhaler 2 Puff(s) Inhalation two times a day  diltiazem    milliGRAM(s) Oral daily  docusate sodium 100 milliGRAM(s) Oral three times a day  famotidine    Tablet 20 milliGRAM(s) Oral at bedtime  gabapentin 300 milliGRAM(s) Oral two times a day  influenza   Vaccine 0.5 milliLiter(s) IntraMuscular once  lactobacillus acidophilus 1 Tablet(s) Oral daily  losartan 25 milliGRAM(s) Oral daily  metoprolol 75 milliGRAM(s) Oral two times a day  multivitamin 1 Tablet(s) Oral daily    MEDICATIONS  (PRN):  acetaminophen   Tablet 650 milliGRAM(s) Oral every 6 hours PRN For Temp greater than 38 C (100.4 F)  ondansetron Injectable 4 milliGRAM(s) IV Push every 6 hours PRN Nausea  senna 2 Tablet(s) Oral at bedtime PRN Constipation    Allergies    predniSONE (Other (Moderate))    Intolerances      Social: no smoking, no alcohol, no illegal drugs; no recent travel, no exposure to TB  FAMILY HISTORY:  No pertinent family history in first degree relatives    ROS: the patient has no fever, no chills, no HA, no dizziness, no sore throat, no blurry vision, no CP, no SOB, no cough, no abdominal pain, no diarrhea, no N/V, no dysuria, no leg pain, no claudication, no rash, no joint aches, no rectal pain or bleeding, no night sweats  Vital Signs Last 24 Hrs  T(C): 36.9 (11 Oct 2017 16:35), Max: 37.2 (10 Oct 2017 18:44)  T(F): 98.5 (11 Oct 2017 16:35), Max: 99 (10 Oct 2017 18:44)  HR: 89 (11 Oct 2017 16:35) (72 - 109)  BP: 115/79 (11 Oct 2017 16:35) (112/47 - 190/112)  BP(mean): --  RR: 18 (11 Oct 2017 16:35) (18 - 20)  SpO2: 91% (11 Oct 2017 16:35) (91% - 99%)  Daily     Daily Weight in k.3 (11 Oct 2017 06:25)  Constitutional: frail looking  HEENT: NC/AT, EOMI, PERRLA  Neck: supple  Respiratory: clear, no r/r/w  Cardiovascular: S1S2 regular, no murmurs  Abdomen: soft, not tender, not distended, positive BS  Genitourinary: deferred  Rectal: deferred  Musculoskeletal: no muscle tenderness, no joint swelling or tenderness  Neurological: AxOx3, moving all extremities, no focal deficits  Skin: left chest wall incision, mild blood on dressing, skin with erythema ?tape reaction                          9.9    11.2  )-----------( 342      ( 11 Oct 2017 06:53 )             30.4     10-11    134<L>  |  101  |  10  ----------------------------<  87  3.9   |  24  |  0.51    Ca    8.5      11 Oct 2017 06:53  Mg     1.7     10-11    TPro  7.0  /  Alb  2.7<L>  /  TBili  0.5  /  DBili  x   /  AST  24  /  ALT  31  /  AlkPhos  113  10-10     LIVER FUNCTIONS - ( 10 Oct 2017 08:53 )  Alb: 2.7 g/dL / Pro: 7.0 gm/dL / ALK PHOS: 113 U/L / ALT: 31 U/L / AST: 24 U/L / GGT: x                 Radiology:< from: Xray Chest 1 View AP/PA. (10.10. @ 09:25) >    EXAM:  CHEST SINGLE VIEW FRONTAL                            PROCEDURE DATE:  10/10/2017          INTERPRETATION:  History: Rapid A. fib    Chest:  one view.      Comparison: 2017    AP radiograph of the chest demonstrates faint infiltrates in the   BILATERAL mid lungs. Sutures are also seen in the LEFT midlung. The   cardiac silhouette is normal in size. Osseous structures are intact.    Impression:faint infiltrates in the BILATERAL mid lungs. Sutures are also   seen in the LEFT midlung      < end of copied text >      Advanced directive addressed: full resuscitation
HPI:  76yo female with PMHx of COPD, malignant HTN, CAD, prior inferior wall MI with SOCRATES stents in 2008, Carotid disease, Lung nodule s/p resection at [facility] [place] Dr Caldera, 6 days ago, Post op paroxysmal atrial fibrillation, who awoke this am with recurrent palpitations. Pt was discharged from [facility] yesterday and then this AM awoke with palpitations and rapid heart beat.  Took pulse on blood pressure at home and they very high so she came in to be evaluated. Denies fever, chills, cough, chest pain, sob, HA, dizziness. No other constitutional symptoms. In the ER, patient was in NSR 80s. Feels overall week after this event today as palpitations lasted the longest they have.      PAST MEDICAL & SURGICAL HISTORY:  MI (myocardial infarction)  CAD (coronary artery disease)  HTN (hypertension)  HL  COPD  Carotid disease  AAA  Peripheral arterial disease  C.Diff infection  GERD  GI Bleeding  Anemia  Lung cancer Surgery  Coronary Stent placement.    SOCIAL HISTORY: Former Smoker/Social ETOH/ No Ilicit Drug use.    FAMILY HISTORY:  + CAD    Home Medications:  Advair Diskus 250 mcg-50 mcg inhalation powder: 1 puff(s) inhaled 2 times a day (10 Oct 2017 12:18)  aspirin 81 mg oral tablet: 1 tab(s) orally once a day (10 Oct 2017 12:18)  Cozaar 25 mg oral tablet: 25 milligram(s) orally 2 times a day (10 Oct 2017 12:18)  Lipitor 80 mg oral tablet: 1 tab(s) orally once a day (10 Oct 2017 12:18)  Lopressor 50 mg oral tablet: 1 tab(s) orally 2 times a day (10 Oct 2017 12:18)    Allergies    predniSONE (Other (Moderate))    Intolerances        HOME MEDICATIONS:   Home Medications:  Advair Diskus 250 mcg-50 mcg inhalation powder: 1 puff(s) inhaled 2 times a day (10 Oct 2017 12:18)  aspirin 81 mg oral tablet: 1 tab(s) orally once a day (10 Oct 2017 12:18)  Cozaar 25 mg oral tablet: 25 milligram(s) orally 2 times a day (10 Oct 2017 12:18)  Lipitor 80 mg oral tablet: 1 tab(s) orally once a day (10 Oct 2017 12:18)  Lopressor 50 mg oral tablet: 1 tab(s) orally 2 times a day (10 Oct 2017 12:18)  REVIEW OF SYSTEMS: 13 systems were reviewed and all negative except for comments above.    Vital Signs Last 24 Hrs  T(C): 37.2 (10 Oct 2017 18:44), Max: 37.2 (10 Oct 2017 18:44)  T(F): 99 (10 Oct 2017 18:44), Max: 99 (10 Oct 2017 18:44)  HR: 109 (10 Oct 2017 18:44) (78 - 139)  BP: 190/112 (10 Oct 2017 18:44) (146/70 - 190/112)  BP(mean): --  RR: 20 (10 Oct 2017 18:44) (17 - 20)  SpO2: 99% (10 Oct 2017 18:44) (95% - 99%)Daily Height in cm: 157.48 (10 Oct 2017 08:35)    Daily I&O's Summary      PHYSICAL EXAM:    Constitutional: NAD, awake and alert, well-developed  HEENT: PERRLA, EOMI,  No oral cyanosis. Oropharynx Clean and Dry.  Neck:  supple,  No JVD, No Thyroid enlargement. No Carotid Bruits bilaterally.  Respiratory: Breath sounds are clear bilaterally, No wheezing, rales or rhonchi  Cardiovascular: NL S1 and S2, RRR, 1/6seM TO lsb and rusb  Gastrointestinal: Bowel Sounds present, soft, NT, ND, No HSM.   Extremities: No peripheral edema. No clubbing or cyanosis.    Neurological: A/O x 3, no focal motor  deficits  Musculoskeletal: no calf tenderness or joint swelling.  Skin: No rashes or lessions.      LABS: All Labs Reviewed:                        10.8   10.6  )-----------( 360      ( 10 Oct 2017 08:53 )             33.0     10 Oct 2017 08:53    136    |  102    |  10     ----------------------------<  101    4.2     |  28     |  0.64     Ca    9.0        10 Oct 2017 08:53    TPro  7.0    /  Alb  2.7    /  TBili  0.5    /  DBili  x      /  AST  24     /  ALT  31     /  AlkPhos  113    10 Oct 2017 08:53    PT/INR - ( 10 Oct 2017 08:53 )   PT: 13.0 sec;   INR: 1.20 ratio         PTT - ( 10 Oct 2017 08:53 )  PTT:28.1 sec  CARDIAC MARKERS ( 10 Oct 2017 13:13 )  0.061 ng/mL / x     / x     / x     / x      CARDIAC MARKERS ( 10 Oct 2017 08:53 )  0.060 ng/mL / x     / 84 U/L / x     / x          10-10 @ 08:53  Pro Bnp [id number]        CARDIAC CATHTERIZATION/STRESS TEST: Stress test 8.2017: negative for ischemia or infarct.
HPI:  Patient is 76yo female with PMHx of HTN, CAD with stents in 2008, Lung nodule s/p left lower lobe wedge resection at [facility] [place] Dr Caldera, 6 days ago, presents with palpitations. Pt reports post operatively at [facility], she was in and out of Afib. Today patient took her HR, 140s, felt palpitations. Denies fever, chills, cough, chest pain, sob, HA, dizziness. No other constitutional symptoms. In the ER, patient was in NSR 80s. Seen by cardiology, Dr Obrien, plan to start A/C with Eliquis. Still some sob and incisional pain.      PAST MEDICAL & SURGICAL HISTORY:  MI (myocardial infarction)  CAD (coronary artery disease)  HTN (hypertension)  No significant past surgical history      MEDICATIONS  (STANDING):  ALBUTerol/ipratropium for Nebulization 3 milliLiter(s) Nebulizer three times a day  apixaban 2.5 milliGRAM(s) Oral every 12 hours  aspirin  chewable 81 milliGRAM(s) Oral daily  atorvastatin 40 milliGRAM(s) Oral at bedtime  buDESOnide 160 MICROgram(s)/formoterol 4.5 MICROgram(s) Inhaler 2 Puff(s) Inhalation two times a day  diltiazem    milliGRAM(s) Oral daily  docusate sodium 100 milliGRAM(s) Oral three times a day  gabapentin 300 milliGRAM(s) Oral two times a day  influenza   Vaccine 0.5 milliLiter(s) IntraMuscular once  lactobacillus acidophilus 1 Tablet(s) Oral daily  losartan 25 milliGRAM(s) Oral daily  metoprolol 75 milliGRAM(s) Oral two times a day  multivitamin 1 Tablet(s) Oral daily  pantoprazole    Tablet 40 milliGRAM(s) Oral before breakfast    MEDICATIONS  (PRN):  acetaminophen   Tablet 650 milliGRAM(s) Oral every 6 hours PRN For Temp greater than 38 C (100.4 F)  ondansetron Injectable 4 milliGRAM(s) IV Push every 6 hours PRN Nausea  senna 2 Tablet(s) Oral at bedtime PRN Constipation      Allergies    predniSONE (Other (Moderate))    Intolerances        SOCIAL HISTORY: Denies tobacco, etoh abuse or illicit drug use    FAMILY HISTORY:  No pertinent family history in first degree relatives      Vital Signs Last 24 Hrs  T(C): 37.1 (12 Oct 2017 05:31), Max: 37.2 (11 Oct 2017 21:20)  T(F): 98.8 (12 Oct 2017 05:31), Max: 98.9 (11 Oct 2017 21:20)  HR: 78 (12 Oct 2017 08:05) (72 - 130)  BP: 119/53 (12 Oct 2017 05:31) (112/47 - 145/85)  BP(mean): 71 (12 Oct 2017 05:31) (71 - 71)  RR: 18 (12 Oct 2017 05:31) (18 - 18)  SpO2: 96% (12 Oct 2017 05:31) (91% - 96%)    REVIEW OF SYSTEMS:    CONSTITUTIONAL:  As per HPI.  SKIN: no rashes  HEENT:  Eyes:  No diplopia or blurred vision. ENT:  No earache, sore throat or runny nose.  CARDIOVASCULAR:  No pressure, squeezing, tightness, heaviness or aching about the chest, neck, axilla or epigastrium.  RESPIRATORY:  No cough, shortness of breath, PND or orthopnea.  GASTROINTESTINAL:  No nausea, vomiting or diarrhea.  GENITOURINARY:  No dysuria, frequency or urgency.  MUSCULOSKELETAL:  As per HPI.  SKIN:  No change in skin, hair or nails.  NEUROLOGIC:  No paresthesias, fasciculations, seizures or weakness.  PSYCHIATRIC:  No disorder of thought or mood.  ENDOCRINE:  No heat or cold intolerance, polyuria or polydipsia.  HEMATOLOGICAL:  No easy bruising or bleedings:  .     PHYSICAL EXAMINATION:    GENERAL APPEARANCE:  Pt. is not currently dyspneic, in no distress. Pt. is alert, oriented, and pleasant.  HEENT:  Pupils are normal and react normally. No icterus. Mucous membranes well colored.  NECK:  Supple. No lymphadenopathy. Jugular venous pressure not elevated. Carotids equal.   HEART:  S1S2 reg  CHEST: decreased BS left base w ronchi.  ABDOMEN:  Soft and nontender.   EXTREMITIES:  There is no cyanosis, clubbing or edema.   SKIN:  No rash or significant lesions are noted.  CNS: AAO x 3    LABS:                        9.5    11.0  )-----------( 324      ( 12 Oct 2017 06:02 )             29.4     10-12    134<L>  |  101  |  9   ----------------------------<  102<H>  4.1   |  25  |  0.53    Ca    8.3<L>      12 Oct 2017 06:02  Mg     1.7     10-11        PT/INR - ( 11 Oct 2017 06:53 )   PT: 16.1 sec;   INR: 1.48 ratio           CARDIAC MARKERS ( 10 Oct 2017 13:13 )  0.061 ng/mL / x     / x     / x     / x                RADIOLOGY & ADDITIONAL STUDIES:
Patient is 78yo female with PMHx of HTN, CAD with stents in 2008, Left Lung nodule s/p resection at [facility] [place] Caldera, 6 days ago prior to admission, was admitted to  with palpitations.  We are on consult for persistent drainage from thoracic wound.  Pt seen at bedside, says drainage has slowed down, it was coming from old chest tube site.  She said drainage was "yellowish".  She denies SOB denies dyspnea.    PAST MEDICAL & SURGICAL HISTORY:  MI (myocardial infarction)  CAD (coronary artery disease)  HTN (hypertension)  No significant past surgical history      REVIEW OF SYSTEMS    MEDICATIONS  (STANDING):  ALBUTerol/ipratropium for Nebulization 3 milliLiter(s) Nebulizer three times a day  apixaban 2.5 milliGRAM(s) Oral every 12 hours  aspirin  chewable 81 milliGRAM(s) Oral daily  atorvastatin 40 milliGRAM(s) Oral at bedtime  buDESOnide 160 MICROgram(s)/formoterol 4.5 MICROgram(s) Inhaler 2 Puff(s) Inhalation two times a day  diltiazem    milliGRAM(s) Oral daily  docusate sodium 100 milliGRAM(s) Oral three times a day  influenza   Vaccine 0.5 milliLiter(s) IntraMuscular once  lactobacillus acidophilus 1 Tablet(s) Oral daily  losartan 25 milliGRAM(s) Oral daily  metoprolol 75 milliGRAM(s) Oral two times a day  multivitamin 1 Tablet(s) Oral daily  pantoprazole    Tablet 40 milliGRAM(s) Oral before breakfast    MEDICATIONS  (PRN):  acetaminophen   Tablet 650 milliGRAM(s) Oral every 6 hours PRN For Temp greater than 38 C (100.4 F)  ondansetron Injectable 4 milliGRAM(s) IV Push every 6 hours PRN Nausea  senna 2 Tablet(s) Oral at bedtime PRN Constipation      Allergies    predniSONE (Other (Moderate))    FAMILY HISTORY:  No pertinent family history in first degree relatives      Vital Signs Last 24 Hrs  T(C): 36.9 (13 Oct 2017 10:09), Max: 37.1 (12 Oct 2017 17:02)  T(F): 98.5 (13 Oct 2017 10:09), Max: 98.7 (12 Oct 2017 17:02)  HR: 75 (13 Oct 2017 10:09) (64 - 86)  BP: 100/47 (13 Oct 2017 10:09) (100/47 - 130/53)  BP(mean): 84 (13 Oct 2017 05:20) (84 - 84)  RR: 25 (13 Oct 2017 10:09) (18 - 25)  SpO2: 93% (13 Oct 2017 10:09) (93% - 96%)    PHYSICAL EXAM:  Constitutional: NAD  Eyes: EOMI  ENMT:Nares patent, throat clear.  Neck: NO LAD  Respiratory: CTA. Large dressing removed from old chest tube site, no active drainage, small amount of old dry drainage on dressing, pt states dressing was placed yesterday. VATS incisions no erythema, steri strips in place, no active drainage.  Cardiovascular: S1, S2  Gastrointestinal:Abd soft, NT  Extremities:No edema.  Vascular:Peripheral pulses palpable  Neurological: A&Ox3  Skin:No rashes  Lymph Nodes:No palapble LAD        LABS:                        9.5    11.1  )-----------( 346      ( 13 Oct 2017 06:35 )             29.1     10-13    136  |  102  |  10  ----------------------------<  96  3.7   |  25  |  0.54    Ca    8.1<L>      13 Oct 2017 06:35            RADIOLOGY & ADDITIONAL STUDIES:  < from: Xray Chest 1 View AP/PA. (10.10.17 @ 09:25) >    EXAM:  CHEST SINGLE VIEW FRONTAL                            PROCEDURE DATE:  10/10/2017          INTERPRETATION:  History: Rapid A. fib    Chest:  one view.      Comparison: 7/7/2017    AP radiograph of the chest demonstrates faint infiltrates in the   BILATERAL mid lungs. Sutures are also seen in the LEFT midlung. The   cardiac silhouette is normal in size. Osseous structures are intact.    Impression:faint infiltrates in the BILATERAL mid lungs. Sutures are also   seen in the LEFT midlung                CECILIA OCAMPO M.D., ATTENDING RADIOLOGIST  This document has been electronically signed. Oct 10 2017 10:46AM        < end of copied text >

## 2017-10-14 RX ORDER — METOPROLOL TARTRATE 50 MG
5 TABLET ORAL ONCE
Qty: 0 | Refills: 0 | Status: COMPLETED | OUTPATIENT
Start: 2017-10-14 | End: 2017-10-14

## 2017-10-14 RX ORDER — LISINOPRIL 2.5 MG/1
2.5 TABLET ORAL DAILY
Qty: 0 | Refills: 0 | Status: DISCONTINUED | OUTPATIENT
Start: 2017-10-14 | End: 2017-10-15

## 2017-10-14 RX ORDER — METOPROLOL TARTRATE 50 MG
50 TABLET ORAL
Qty: 0 | Refills: 0 | Status: DISCONTINUED | OUTPATIENT
Start: 2017-10-14 | End: 2017-10-15

## 2017-10-14 RX ADMIN — LOSARTAN POTASSIUM 25 MILLIGRAM(S): 100 TABLET, FILM COATED ORAL at 05:34

## 2017-10-14 RX ADMIN — Medication 3 MILLILITER(S): at 09:43

## 2017-10-14 RX ADMIN — Medication 650 MILLIGRAM(S): at 05:35

## 2017-10-14 RX ADMIN — APIXABAN 2.5 MILLIGRAM(S): 2.5 TABLET, FILM COATED ORAL at 05:34

## 2017-10-14 RX ADMIN — Medication 180 MILLIGRAM(S): at 05:34

## 2017-10-14 RX ADMIN — APIXABAN 2.5 MILLIGRAM(S): 2.5 TABLET, FILM COATED ORAL at 18:22

## 2017-10-14 RX ADMIN — Medication 650 MILLIGRAM(S): at 18:22

## 2017-10-14 RX ADMIN — Medication 3 MILLILITER(S): at 19:50

## 2017-10-14 RX ADMIN — Medication 81 MILLIGRAM(S): at 12:46

## 2017-10-14 RX ADMIN — PANTOPRAZOLE SODIUM 40 MILLIGRAM(S): 20 TABLET, DELAYED RELEASE ORAL at 05:34

## 2017-10-14 RX ADMIN — LISINOPRIL 2.5 MILLIGRAM(S): 2.5 TABLET ORAL at 23:36

## 2017-10-14 RX ADMIN — Medication 1 TABLET(S): at 12:46

## 2017-10-14 RX ADMIN — Medication 75 MILLIGRAM(S): at 05:34

## 2017-10-14 RX ADMIN — Medication 650 MILLIGRAM(S): at 12:46

## 2017-10-14 RX ADMIN — Medication 1 TABLET(S): at 12:45

## 2017-10-14 RX ADMIN — Medication 50 MILLIGRAM(S): at 18:22

## 2017-10-14 RX ADMIN — Medication 5 MILLIGRAM(S): at 02:59

## 2017-10-14 RX ADMIN — Medication 3 MILLILITER(S): at 15:56

## 2017-10-14 RX ADMIN — ATORVASTATIN CALCIUM 40 MILLIGRAM(S): 80 TABLET, FILM COATED ORAL at 21:49

## 2017-10-14 NOTE — PROVIDER CONTACT NOTE (OTHER) - ASSESSMENT
Pt tossing and turning in bed with HR in 170's a-fib. When resting, 's - 140's. Earlier this evening, HR was in 160's when ambulating and 5mg IVP lopressor was given at 22:00. HR stabilized and was in 100-110. Pt denies chest pain and dyspnea.

## 2017-10-14 NOTE — PROVIDER CONTACT NOTE (OTHER) - SITUATION
Spoke to Bryanna at office.
Spoke to Fanny at office.
Spoke to Ros at office. Dr. Copeland is covering for Dr. Barrientos.
HR in 170's, a-fib

## 2017-10-14 NOTE — CHART NOTE - NSCHARTNOTEFT_GEN_A_CORE
HPI:  Patient is 78yo female with PMHx of HTN, CAD with stents in 2008, Lung nodule s/p resection at [facility] Dr Caldera, 6 days ago, presents with palpitations. Pt reports post operatively at [facility], she was in and out of Afib. Today patient took her HR, 140s, felt palpitations. Denies fever, chills, cough, chest pain, sob, HA, dizziness. No other constitutional symptoms. In the ER, patient was in NSR 80s. Seen by cardiology, Dr Obrien, plan to start A/C with Eliquis. (10 Oct 2017 16:47)    Advised by nursing staff that pt had reverted back into afib with . Pt missed metoprolol dose earlier in the evening d/t low BP. Pt states she feels palpitations. denies chest pain or sob. pt has no complaints. BP checked again and now WNL. Pt given 5mg Lopressor and rate controlled.  EKG shows fib. trop negative.  Later in the evening pt HR began to elevate again into 160s. Pt still without complaints. HR tends to elevate when uncomfortable or ambulating to the bathroom.    Vital Signs Last 24 Hrs  T(C): 36.8 (13 Oct 2017 22:25), Max: 37.2 (13 Oct 2017 16:29)  T(F): 98.3 (13 Oct 2017 22:25), Max: 98.9 (13 Oct 2017 16:29)  HR: 126 (14 Oct 2017 02:36) (68 - 146)  BP: 156/75 (14 Oct 2017 02:36) (93/76 - 156/75)  BP(mean): 84 (13 Oct 2017 05:20) (84 - 84)  RR: 18 (14 Oct 2017 02:36) (17 - 25)  SpO2: 97% (14 Oct 2017 02:36) (93% - 100%)    Gen: NAD  Cardio: s1s2 rrr  Resp: cta  ab: soft, nt, nd    Plan:  Give another lopressor 2.5mg- rate better controlled  try to refrain from ambulation for now  will give am meds earlier if necessary  trop negative

## 2017-10-15 ENCOUNTER — TRANSCRIPTION ENCOUNTER (OUTPATIENT)
Age: 77
End: 2017-10-15

## 2017-10-15 VITALS
DIASTOLIC BLOOD PRESSURE: 57 MMHG | HEART RATE: 80 BPM | TEMPERATURE: 98 F | SYSTOLIC BLOOD PRESSURE: 113 MMHG | RESPIRATION RATE: 18 BRPM | OXYGEN SATURATION: 99 %

## 2017-10-15 DIAGNOSIS — I10 ESSENTIAL (PRIMARY) HYPERTENSION: ICD-10-CM

## 2017-10-15 DIAGNOSIS — I48.0 PAROXYSMAL ATRIAL FIBRILLATION: ICD-10-CM

## 2017-10-15 PROCEDURE — 99232 SBSQ HOSP IP/OBS MODERATE 35: CPT

## 2017-10-15 RX ORDER — LISINOPRIL 2.5 MG/1
1 TABLET ORAL
Qty: 30 | Refills: 0 | OUTPATIENT
Start: 2017-10-15 | End: 2017-11-14

## 2017-10-15 RX ORDER — DILTIAZEM HCL 120 MG
1 CAPSULE, EXT RELEASE 24 HR ORAL
Qty: 30 | Refills: 0 | OUTPATIENT
Start: 2017-10-15 | End: 2017-11-14

## 2017-10-15 RX ORDER — APIXABAN 2.5 MG/1
1 TABLET, FILM COATED ORAL
Qty: 60 | Refills: 0 | OUTPATIENT
Start: 2017-10-15 | End: 2017-11-14

## 2017-10-15 RX ORDER — APIXABAN 2.5 MG/1
1 TABLET, FILM COATED ORAL
Qty: 2 | Refills: 0 | OUTPATIENT
Start: 2017-10-15

## 2017-10-15 RX ORDER — LOSARTAN POTASSIUM 100 MG/1
25 TABLET, FILM COATED ORAL
Qty: 0 | Refills: 0 | COMMUNITY

## 2017-10-15 RX ADMIN — APIXABAN 2.5 MILLIGRAM(S): 2.5 TABLET, FILM COATED ORAL at 05:44

## 2017-10-15 RX ADMIN — Medication 180 MILLIGRAM(S): at 05:44

## 2017-10-15 RX ADMIN — Medication 650 MILLIGRAM(S): at 12:28

## 2017-10-15 RX ADMIN — Medication 81 MILLIGRAM(S): at 12:28

## 2017-10-15 RX ADMIN — Medication 3 MILLILITER(S): at 09:37

## 2017-10-15 RX ADMIN — Medication 1 TABLET(S): at 12:28

## 2017-10-15 RX ADMIN — PANTOPRAZOLE SODIUM 40 MILLIGRAM(S): 20 TABLET, DELAYED RELEASE ORAL at 06:17

## 2017-10-15 RX ADMIN — LISINOPRIL 2.5 MILLIGRAM(S): 2.5 TABLET ORAL at 12:28

## 2017-10-15 RX ADMIN — Medication 650 MILLIGRAM(S): at 05:43

## 2017-10-15 RX ADMIN — Medication 50 MILLIGRAM(S): at 05:44

## 2017-10-15 NOTE — DISCHARGE NOTE ADULT - PATIENT PORTAL LINK FT
“You can access the [facility] Patient Portal, offered by [facility], by registering with the following website: [email or web address]”

## 2017-10-15 NOTE — PROGRESS NOTE ADULT - ASSESSMENT
patient s/p recent LLL wedge resection for stage 1A NSCL Ca  paroxysmal afib - now in NSR  avoid beta agonists now if possible  pulmonary status stable  dvt proph
* Stable / improved cardiac status; outpatient f/u with Dr. Obrien,
77/F admitted with     # PAF  -  HD stable, comfortable, in NSR,  - A/C as per cardiology, continue Eliquis 2.5mg bid  - Cont with Lopressor and cardizem  - cardio consult appreciated    # HTN  - Cont with home meds    # CAD  - cont with ASA, Lopressor, Statin  - Hold Plavix as per cardiology    # Hx of Lung Ca  - s/p VATS resection, follow up at [facility]  - CT sx consult for serosanguinous discharge from incision site    # Mag of 1.7: replace and recheck    # ? Lung infiltrates + leukocytosis:   - observe off ABX  - appreciate ID consult  - appreciated pulm consult    # Hx of PUD   - guaic negative  - GI consult: Dr. Ramirez appreciated  - protonix    # DVT ppx, Eliquis    dispo: continue tele monitoring today. if HR controlled likely d/c in AM
77/F admitted with     # PAF - better rate control today. will monitor on tele   -  HD stable, comfortable, in NSR,  - A/C as per cardiology, continue Eliquis 2.5mg bid  - Cont with Lopressor and cardizem  - cardio consult appreciated    # HTN  - Cont with home meds    # CAD  - cont with ASA, Lopressor, Statin  - Hold Plavix as per cardiology since pt on eliquis     # Hx of Lung Ca  - s/p VATS resection, follow up at [facility]  - CT sx consult for serosanguinous discharge from incision site    # Mag of 1.7: replace and recheck    # ? Lung infiltrates + leukocytosis:   - observe off ABX  - appreciate ID consult  - appreciated pulm consult    # Hx of PUD   - guaic negative  - GI consult: Dr. Ramirez appreciated  - protonix    # DVT ppx, Eliquis    dispo: continue tele monitoring today. if HR controlled likely d/c in AM
77/F admitted with     # PAF - uncotnroleld overnight due to medication being held.  will adjust doses and stop losartan and cahgne to lisinopril 2.5 mg daily, lopressor 50 mg bid with hold parameters. continue cardizem.  -  HD stable, comfortable, in NSR now with no dizziness, cp, sob.  - A/C as per cardiology, continue Eliquis 2.5mg bid  - Cont with Lopressor and cardizem  - cardio consult appreciated adn they chapo re-eval pt in AM as per my conversation with dr reich today     # HTN  - Cont with home meds    # CAD  - cont with ASA, Lopressor, Statin  - Hold Plavix as per cardiology since pt on eliquis     # Hx of Lung Ca  - s/p VATS resection, follow up at [facility]  - CT sx consult for serosanguinous discharge from incision site and okay to remove dressing tomorrow and shower in AM.      # Mag of 1.7: replace and recheck    # ? Lung infiltrates + leukocytosis:   - observe off ABX  - appreciate ID consult  - appreciated pulm consult    # Hx of PUD   - guaic negative  - GI consult: Dr. Ramirez appreciated  - protonix    # DVT ppx, Eliquis    dispo: continue tele monitoring today. if HR controlled likely d/c in AM
77/F admitted with     78yo female a.w PAF    # PAF  -  HD stable, comfortable, in NSR,  - A/C as per cardiology, Eliquis 2.5mg bid  - Cont with Lopressor  - check TSH    # HTN  - Cont with home meds    # CAD  - cont with ASA, Lopressor, Statin  - Hold Plavix as per cardiology    # Hx of Lung Ca  - s/p VATS resection, follow up at [facility]  - CT sx consult for serosanguinous discharge from incision site    # Mag of 1.7: replace and recheck    # ? Lung infiltrates + leukocytosis: observe off ABX; appreciate ID consult    # SOB : duonebs + pulmo consult    # Hx of PUD  + ? GI bleed:  now started on eliquis by cardio  GI consult: Dr. Ramirez awaited    # DVT ppx, Eliquis      poc discussed with pt, team
Hgb stable  no evidence for gi bleeding    for now protonix will be continued as gi ppl   continue to monitor hgb  diet advanced
cardiology PROBLEM Dx:  Coronary artery disease involving native coronary artery of native heart without angina pectoris: Coronary artery disease involving native coronary artery of native heart without angina pectoris  Hypertension, unspecified type: Hypertension, unspecified type  Paroxysmal atrial fibrillation: Paroxysmal atrial fibrillation      Assessment     Problem: Paroxysmal atrial fibrillation.   Add Cardizem for rate control   Add AC with eliquis 2.5mg BID.    Tele monitoring   Dc clopidogrel        ·  Problem: Hypertension   Addition of Cardizem    BP.better controlled      ·  Problem: Coronary artery disease involving native coronary artery of native heart without angina pectoris.     Stable on current regime.   off of PLAVIX

## 2017-10-15 NOTE — PROGRESS NOTE ADULT - SUBJECTIVE AND OBJECTIVE BOX
Subjective:  no distress   in NSR    MEDICATIONS  (STANDING):  ALBUTerol/ipratropium for Nebulization 3 milliLiter(s) Nebulizer three times a day  apixaban 2.5 milliGRAM(s) Oral every 12 hours  aspirin  chewable 81 milliGRAM(s) Oral daily  atorvastatin 40 milliGRAM(s) Oral at bedtime  buDESOnide 160 MICROgram(s)/formoterol 4.5 MICROgram(s) Inhaler 2 Puff(s) Inhalation two times a day  diltiazem    milliGRAM(s) Oral daily  docusate sodium 100 milliGRAM(s) Oral three times a day  influenza   Vaccine 0.5 milliLiter(s) IntraMuscular once  lactobacillus acidophilus 1 Tablet(s) Oral daily  losartan 25 milliGRAM(s) Oral daily  metoprolol 75 milliGRAM(s) Oral two times a day  multivitamin 1 Tablet(s) Oral daily  pantoprazole    Tablet 40 milliGRAM(s) Oral before breakfast    MEDICATIONS  (PRN):  acetaminophen   Tablet 650 milliGRAM(s) Oral every 6 hours PRN For Temp greater than 38 C (100.4 F)  ondansetron Injectable 4 milliGRAM(s) IV Push every 6 hours PRN Nausea  senna 2 Tablet(s) Oral at bedtime PRN Constipation      Allergies    predniSONE (Other (Moderate))    Intolerances        REVIEW OF SYSTEMS:    CONSTITUTIONAL:  As per HPI.  HEENT:  Eyes:  No diplopia or blurred vision. ENT:  No earache, sore throat or runny nose.  CARDIOVASCULAR:  No pressure, squeezing, tightness, heaviness or aching about the chest, neck, axilla or epigastrium.  RESPIRATORY:  No cough, shortness of breath, PND or orthopnea.  GASTROINTESTINAL:  No nausea, vomiting or diarrhea.  GENITOURINARY:  No dysuria, frequency or urgency.  MUSCULOSKELETAL:  no joint pain, deformity, tenderness  EXTREMITIES: no clubbing cyanosis,edema  SKIN:  No change in skin, hair or nails.  NEUROLOGIC:  No paresthesias, fasciculations, seizures or weakness.  PSYCHIATRIC:  No disorder of thought or mood.  ENDOCRINE:  No heat or cold intolerance, polyuria or polydipsia.  HEMATOLOGICAL:  No easy bruising or bleedings:    Vital Signs Last 24 Hrs  T(C): 36.9 (13 Oct 2017 10:09), Max: 37.1 (12 Oct 2017 17:02)  T(F): 98.5 (13 Oct 2017 10:09), Max: 98.7 (12 Oct 2017 17:02)  HR: 75 (13 Oct 2017 10:09) (64 - 86)  BP: 100/47 (13 Oct 2017 10:09) (96/46 - 130/53)  BP(mean): 84 (13 Oct 2017 05:20) (84 - 84)  RR: 25 (13 Oct 2017 10:09) (18 - 25)  SpO2: 93% (13 Oct 2017 10:09) (93% - 96%)    PHYSICAL EXAMINATION:  SKIN: no rashes  HEAD: NC/AT  EYES: PERRLA, EOMI  EARS: TM's intact  NOSE: no abnormalities  NECK:  Supple. No lymphadenopathy. Jugular venous pressure not elevated. Carotids equal.   HEART:   The cardiac impulse has a normal quality. Reg., Nl S1 and S2.  There are no murmurs, rubs or gallops noted  CHEST:  Chest is clear to auscultation. Normal respiratory effort. Decreased BS left base  ABDOMEN:  Soft and nontender.   EXTREMITIES:  no C/C/E  NEURO: AAO x 3, no focal deficts       LABS:                        9.5    11.1  )-----------( 346      ( 13 Oct 2017 06:35 )             29.1     10-13    136  |  102  |  10  ----------------------------<  96  3.7   |  25  |  0.54    Ca    8.1<L>      13 Oct 2017 06:35            RADIOLOGY & ADDITIONAL TESTS:
HPI: Patient is 76yo female with PMHx of HTN, CAD with stents in , Lung nodule s/p resection at [facility] Dr Caldera, 6 days ago, presents with palpitations. Pt reports post operatively at [facility], she was in and out of Afib. Today patient took her HR, 140s, felt palpitations. Denies fever, chills, cough, chest pain, sob, HA, dizziness. No other constitutional symptoms. In the ER, patient was in NSR 80s. Seen by cardiology, Dr Obrien, plan to start A/C with Eliquis.    10/11: c/o sob better after duoneb      PHYSICAL EXAM:    Daily     Daily Weight in k.3 (11 Oct 2017 06:25)    ICU Vital Signs Last 24 Hrs  T(C): 37.1 (11 Oct 2017 10:18), Max: 37.2 (10 Oct 2017 18:44)  T(F): 98.7 (11 Oct 2017 10:18), Max: 99 (10 Oct 2017 18:44)  HR: 72 (11 Oct 2017 14:00) (72 - 109)  BP: 112/47 (11 Oct 2017 10:18) (112/47 - 190/112)  BP(mean): --  ABP: --  ABP(mean): --  RR: 18 (11 Oct 2017 10:18) (18 - 20)  SpO2: 95% (11 Oct 2017 10:18) (93% - 99%)      Constitutional: Weak appearing  HEENT: Atraumatic, SOCO, Normal, No congestion  Respiratory: Breath Sounds normal, no rhonchi/wheeze  Cardiovascular: N S1S2; KOLTON present  Gastrointestinal: Abdomen soft, non tender, Bowel Sounds present  Extremities: No edema, peripheral pulses present  Neurological: AAO x 3, no gross focal motor deficits  Skin: Non cellulitic, no rash, ulcers, serosanguinous discharge from left side of chest  Breasts: Deferred  Genitourinary: deferred  Rectal: Deferred                          9.9    11.2  )-----------( 342      ( 11 Oct 2017 06:53 )             30.4       CBC Full  -  ( 11 Oct 2017 06:53 )  WBC Count : 11.2 K/uL  Hemoglobin : 9.9 g/dL  Hematocrit : 30.4 %  Platelet Count - Automated : 342 K/uL  Mean Cell Volume : 88.3 fl  Mean Cell Hemoglobin : 28.8 pg  Mean Cell Hemoglobin Concentration : 32.6 gm/dL  Auto Neutrophil # : 8.2 K/uL  Auto Lymphocyte # : 1.0 K/uL  Auto Monocyte # : 1.6 K/uL  Auto Eosinophil # : 0.2 K/uL  Auto Basophil # : 0.1 K/uL  Auto Neutrophil % : 73.4 %  Auto Lymphocyte % : 8.7 %  Auto Monocyte % : 14.7 %  Auto Eosinophil % : 2.2 %  Auto Basophil % : 1.0 %      10-11    134<L>  |  101  |  10  ----------------------------<  87  3.9   |  24  |  0.51    Ca    8.5      11 Oct 2017 06:53  Mg     1.7     10-11    TPro  7.0  /  Alb  2.7<L>  /  TBili  0.5  /  DBili  x   /  AST  24  /  ALT  31  /  AlkPhos  113  1010      LIVER FUNCTIONS - ( 10 Oct 2017 08:53 )  Alb: 2.7 g/dL / Pro: 7.0 gm/dL / ALK PHOS: 113 U/L / ALT: 31 U/L / AST: 24 U/L / GGT: x             PT/INR - ( 11 Oct 2017 06:53 )   PT: 16.1 sec;   INR: 1.48 ratio         PTT - ( 10 Oct 2017 08:53 )  PTT:28.1 sec    CARDIAC MARKERS ( 10 Oct 2017 13:13 )  0.061 ng/mL / x     / x     / x     / x      CARDIAC MARKERS ( 10 Oct 2017 08:53 )  0.060 ng/mL / x     / 84 U/L / x     / x                    MEDICATIONS  (STANDING):  ALBUTerol/ipratropium for Nebulization 3 milliLiter(s) Nebulizer three times a day  apixaban 2.5 milliGRAM(s) Oral every 12 hours  aspirin  chewable 81 milliGRAM(s) Oral daily  atorvastatin 40 milliGRAM(s) Oral at bedtime  buDESOnide 160 MICROgram(s)/formoterol 4.5 MICROgram(s) Inhaler 2 Puff(s) Inhalation two times a day  diltiazem    milliGRAM(s) Oral daily  docusate sodium 100 milliGRAM(s) Oral three times a day  famotidine    Tablet 20 milliGRAM(s) Oral at bedtime  gabapentin 300 milliGRAM(s) Oral two times a day  influenza   Vaccine 0.5 milliLiter(s) IntraMuscular once  lactobacillus acidophilus 1 Tablet(s) Oral daily  losartan 25 milliGRAM(s) Oral daily  metoprolol 75 milliGRAM(s) Oral two times a day  multivitamin 1 Tablet(s) Oral daily
HPI: Patient is 76yo female with PMHx of HTN, CAD with stents in 2008, Lung nodule s/p resection at [facility] Dr Caldera, 6 days ago, presents with palpitations. Pt reports post operatively at [facility], she was in and out of Afib. Today patient took her HR, 140s, felt palpitations. Denies fever, chills, cough, chest pain, sob, HA, dizziness. No other constitutional symptoms. In the ER, patient was in NSR 80s. Seen by cardiology, Dr Obrien, plan to start A/C with Eliquis.    10/11: c/o sob better after duoneb  10/12/17 Pt seen and examined. No new complaints. No abd pain, no melena or hematochezia. Still has oozing from left flank at surgery site. No chest pain or SOB. Overnight pt went into rapid a. fib with HR in 140s which improved with lopressor 2.5mg IV x2 doses. Currently in a. fib with HR in 70s. No chest pain.      PHYSICAL EXAM:    Vital Signs Last 24 Hrs  T(C): 36.8 (12 Oct 2017 10:22), Max: 37.2 (11 Oct 2017 21:20)  T(F): 98.2 (12 Oct 2017 10:22), Max: 98.9 (11 Oct 2017 21:20)  HR: 74 (12 Oct 2017 10:22) (72 - 130)  BP: 96/46 (12 Oct 2017 10:22) (96/46 - 145/85)  BP(mean): 71 (12 Oct 2017 05:31) (71 - 71)  RR: 18 (12 Oct 2017 10:22) (18 - 18)  SpO2: 94% (12 Oct 2017 10:22) (91% - 96%)      Constitutional: Weak appearing  HEENT: Atraumatic, SOCO, Normal, No congestion  Respiratory: Breath Sounds normal, no rhonchi/wheeze  Cardiovascular: N S1S2; KOLTON present  Gastrointestinal: Abdomen soft, non tender, Bowel Sounds present  Extremities: No edema, peripheral pulses present  Neurological: AAO x 3, no gross focal motor deficits  Skin: Non cellulitic, no rash, ulcers, serosanguinous discharge from left side of chest  Breasts: Deferred  Genitourinary: deferred  Rectal: Deferred    Labs    CARDIAC MARKERS ( 10 Oct 2017 13:13 )  0.061 ng/mL / x     / x     / x     / x                                9.5    11.0  )-----------( 324      ( 12 Oct 2017 06:02 )             29.4     12 Oct 2017 06:02    134    |  101    |  9      ----------------------------<  102    4.1     |  25     |  0.53     Ca    8.3        12 Oct 2017 06:02  Mg     1.7       11 Oct 2017 06:53      PT/INR - ( 11 Oct 2017 06:53 )   PT: 16.1 sec;   INR: 1.48 ratio           CAPILLARY BLOOD GLUCOSE      MEDICATIONS  (STANDING):  ALBUTerol/ipratropium for Nebulization 3 milliLiter(s) Nebulizer three times a day  apixaban 2.5 milliGRAM(s) Oral every 12 hours  aspirin  chewable 81 milliGRAM(s) Oral daily  atorvastatin 40 milliGRAM(s) Oral at bedtime  buDESOnide 160 MICROgram(s)/formoterol 4.5 MICROgram(s) Inhaler 2 Puff(s) Inhalation two times a day  diltiazem    milliGRAM(s) Oral daily  docusate sodium 100 milliGRAM(s) Oral three times a day  gabapentin 300 milliGRAM(s) Oral two times a day  influenza   Vaccine 0.5 milliLiter(s) IntraMuscular once  lactobacillus acidophilus 1 Tablet(s) Oral daily  losartan 25 milliGRAM(s) Oral daily  metoprolol 75 milliGRAM(s) Oral two times a day  multivitamin 1 Tablet(s) Oral daily  pantoprazole    Tablet 40 milliGRAM(s) Oral before breakfast    MEDICATIONS  (PRN):  acetaminophen   Tablet 650 milliGRAM(s) Oral every 6 hours PRN For Temp greater than 38 C (100.4 F)  ondansetron Injectable 4 milliGRAM(s) IV Push every 6 hours PRN Nausea  senna 2 Tablet(s) Oral at bedtime PRN Constipation
HPI: Patient is 76yo female with PMHx of HTN, CAD with stents in 2008, [place]ule [place] [facility] [place], 6 days ago, presents with palpitations. Pt reports [place] [facility] she was in and out of Afib. Today patient took her HR, 140s, felt palpitations. Denies fever, chills, cough, chest pain, sob, HA, dizziness. No other constitutional symptoms. In the ER, patient was in NSR 80s. Seen by cardiology, Dr Obrien, plan to start A/C with Eliquis.    10/11: c/o sob better after duoneb  10/12/17 Pt seen and examined. No new complaints. No abd pain, no melena or hematochezia. Still has oozing from left flank at surgery site. No chest pain or SOB. Overnight pt went into rapid a. fib with HR in 140s which improved with lopressor 2.5mg IV x2 doses. Currently in a. fib with HR in 70s. No chest pain.  10/13 - pt feels better today. seen by cardiothoracic surg.        PHYSICAL EXAM:    ICU Vital Signs Last 24 Hrs  T(C): 36.9 (13 Oct 2017 10:09), Max: 37.1 (12 Oct 2017 17:02)  T(F): 98.5 (13 Oct 2017 10:09), Max: 98.7 (12 Oct 2017 17:02)  HR: 75 (13 Oct 2017 10:09) (64 - 86)  BP: 100/47 (13 Oct 2017 10:09) (100/47 - 130/53)  BP(mean): 84 (13 Oct 2017 05:20) (84 - 84)  ABP: --  ABP(mean): --  RR: 25 (13 Oct 2017 10:09) (18 - 25)  SpO2: 93% (13 Oct 2017 10:09) (93% - 96%)      Constitutional: Weak appearing  HEENT: Atraumatic, SOCO, Normal, No congestion  Respiratory: Breath Sounds normal, no rhonchi/wheeze  Cardiovascular: N S1S2; KOLTON present  Gastrointestinal: Abdomen soft, non tender, Bowel Sounds present  Extremities: No edema, peripheral pulses present  Neurological: AAO x 3, no gross focal motor deficits  Skin: Non cellulitic, no rash, ulcers, serosanguinous discharge from left side of chest  Breasts: Deferred  Genitourinary: deferred  Rectal: Deferred    Labs    CARDIAC MARKERS ( 10 Oct 2017 13:13 )  0.061 ng/mL / x     / x     / x     / x                                  9.5    11.1  )-----------( 346      ( 13 Oct 2017 06:35 )             29.1     10-13    136  |  102  |  10  ----------------------------<  96  3.7   |  25  |  0.54    Ca    8.1<L>      13 Oct 2017 06:35          CAPILLARY BLOOD GLUCOSE      MEDICATIONS  (STANDING):  ALBUTerol/ipratropium for Nebulization 3 milliLiter(s) Nebulizer three times a day  apixaban 2.5 milliGRAM(s) Oral every 12 hours  aspirin  chewable 81 milliGRAM(s) Oral daily  atorvastatin 40 milliGRAM(s) Oral at bedtime  buDESOnide 160 MICROgram(s)/formoterol 4.5 MICROgram(s) Inhaler 2 Puff(s) Inhalation two times a day  diltiazem    milliGRAM(s) Oral daily  docusate sodium 100 milliGRAM(s) Oral three times a day  gabapentin 300 milliGRAM(s) Oral two times a day  influenza   Vaccine 0.5 milliLiter(s) IntraMuscular once  lactobacillus acidophilus 1 Tablet(s) Oral daily  losartan 25 milliGRAM(s) Oral daily  metoprolol 75 milliGRAM(s) Oral two times a day  multivitamin 1 Tablet(s) Oral daily  pantoprazole    Tablet 40 milliGRAM(s) Oral before breakfast    MEDICATIONS  (PRN):  acetaminophen   Tablet 650 milliGRAM(s) Oral every 6 hours PRN For Temp greater than 38 C (100.4 F)  ondansetron Injectable 4 milliGRAM(s) IV Push every 6 hours PRN Nausea  senna 2 Tablet(s) Oral at bedtime PRN Constipation
HPI: Patient is 78yo female with PMHx of HTN, CAD with stents in 2008, Lung nodule s/p resection at [facility] Dr Caldera, 6 days ago, presents with palpitations. Pt reports post operatively at [facility], she was in and out of Afib. Today patient took her HR, 140s, felt palpitations. Denies fever, chills, cough, chest pain, sob, HA, dizziness. No other constitutional symptoms. In the ER, patient was in NSR 80s. Seen by cardiology, Dr Obrien, plan to start A/C with Eliquis.    10/11: c/o sob better after duoneb  10/12/17 Pt seen and examined. No new complaints. No abd pain, no melena or hematochezia. Still has oozing from left flank at surgery site. No chest pain or SOB. Overnight pt went into rapid a. fib with HR in 140s which improved with lopressor 2.5mg IV x2 doses. Currently in a. fib with HR in 70s. No chest pain.  10/13 - pt feels better today. seen by cardiothoracic surg.    10/14 - overnight events noted. lopressor held due to SBP 90s ydy evening and subsequently pt with rapid afib requiring IV lopressor by overnight team and now NSR 60s and SBP 90 after AM lopressor.  discussed with patient at length regarding events and changes to be made regarding medications. and case d/w dr reich who will see patient in AM       PHYSICAL EXAM:    ICU Vital Signs Last 24 Hrs  T(C): 37.1 (14 Oct 2017 10:08), Max: 37.2 (13 Oct 2017 16:29)  T(F): 98.7 (14 Oct 2017 10:08), Max: 98.9 (13 Oct 2017 16:29)  HR: 74 (14 Oct 2017 10:08) (68 - 146)  BP: 94/54 (14 Oct 2017 10:08) (93/76 - 156/75)  BP(mean): --  ABP: --  ABP(mean): --  RR: 18 (14 Oct 2017 10:08) (17 - 19)  SpO2: 96% (14 Oct 2017 10:08) (95% - 100%)    Constitutional: Weak appearing  HEENT: Atraumatic, SOCO, Normal, No congestion  Respiratory: Breath Sounds normal, no rhonchi/wheeze  Chest - right chest wall dressing in place   Cardiovascular: N S1S2;  RRR  KOLTON present  Gastrointestinal: Abdomen soft, non tender, Bowel Sounds present  Extremities: No edema, peripheral pulses present  Neurological: AAO x 3, no gross focal motor deficits  Skin: Non cellulitic, no rash, ulcers, serosanguinous discharge from left side of chest  Breasts: Deferred  Genitourinary: deferred  Rectal: Deferred    Labs                            9.5    11.1  )-----------( 346      ( 13 Oct 2017 06:35 )             29.1     10-13    136  |  102  |  10  ----------------------------<  96  3.7   |  25  |  0.54    Ca    8.1<L>      13 Oct 2017 06:35      CARDIAC MARKERS ( 13 Oct 2017 23:25 )  <0.015 ng/mL / x     / x     / x     / x        MEDICATIONS  (STANDING):  acetaminophen   Tablet. 650 milliGRAM(s) Oral every 6 hours  ALBUTerol/ipratropium for Nebulization 3 milliLiter(s) Nebulizer three times a day  apixaban 2.5 milliGRAM(s) Oral every 12 hours  aspirin  chewable 81 milliGRAM(s) Oral daily  atorvastatin 40 milliGRAM(s) Oral at bedtime  buDESOnide 160 MICROgram(s)/formoterol 4.5 MICROgram(s) Inhaler 2 Puff(s) Inhalation two times a day  diltiazem    milliGRAM(s) Oral daily  docusate sodium 100 milliGRAM(s) Oral three times a day  influenza   Vaccine 0.5 milliLiter(s) IntraMuscular once  lactobacillus acidophilus 1 Tablet(s) Oral daily  lisinopril 2.5 milliGRAM(s) Oral daily  metoprolol 50 milliGRAM(s) Oral two times a day  multivitamin 1 Tablet(s) Oral daily  pantoprazole    Tablet 40 milliGRAM(s) Oral before breakfast    MEDICATIONS  (PRN):  ondansetron Injectable 4 milliGRAM(s) IV Push every 6 hours PRN Nausea  senna 2 Tablet(s) Oral at bedtime PRN Constipation
PCP:    REQUESTING PHYSICIAN:    REASON FOR CONSULT:    CHIEF COMPLAINT:    HPI:  65 y/o female with HTN, CAD (Inferior Wall MI with PCI or RCA), HL, PVD, and AAA who on wednesday was doing some work around house and exacerbated her shoulder and continued to have discomfort there and went to an urgent care center Thursday to be evaluated and was told blood pressure was 250 systolic.  Apparently was sent home from there as it was thought that machine was broken.  Pt. herself rechecked blood pressure this AM and again it was >250 mmhg sysytolic.and came to be evaluated.  In ER /120.  Denies CP or SOB or current significant back pain, but approximately 3 weeks ago had severe back pain attributed to disk disease which resolved and says now only minimal (lower back).  Notes a history of Blood pressure differences in her arms (60mmhg).  IN ER she had a Head CT negative for acute finding. Received labetolol in ER with improvement to  /60 HR 70s. Denies wheezing, cough, phlegm or fever.  Denies orthopnea, PND, or LE edema.  Denies claudication as well.     7/8/17: Pt feels improved today. She denies chest pain or shortness of breath.          PAST MEDICAL & SURGICAL HISTORY:  MI (myocardial infarction)  CAD (coronary artery disease)  HTN (hypertension)  No significant past surgical history      SOCIAL HISTORY:    FAMILY HISTORY:  No pertinent family history in first degree relatives      ALLERGIES:  Allergies    predniSONE (Other (Moderate))    Intolerances        MEDICATIONS:    MEDICATIONS  (STANDING):  atorvastatin 80 milliGRAM(s) Oral at bedtime  clopidogrel Tablet 75 milliGRAM(s) Oral daily  heparin  Injectable 5000 Unit(s) SubCutaneous every 12 hours  nicotine - 21 mG/24Hr(s) Patch 1 patch Transdermal daily  losartan 50 milliGRAM(s) Oral two times a day  carvedilol 12.5 milliGRAM(s) Oral every 12 hours  amLODIPine   Tablet 2.5 milliGRAM(s) Oral two times a day  buDESOnide 160 MICROgram(s)/formoterol 4.5 MICROgram(s) Inhaler 2 Puff(s) Inhalation two times a day  famotidine    Tablet 20 milliGRAM(s) Oral at bedtime    MEDICATIONS  (PRN):  acetaminophen   Tablet. 650 milliGRAM(s) Oral every 6 hours PRN Mild Pain (1 - 3)        Vital Signs Last 24 Hrs  T(C): 36.8 (08 Jul 2017 05:35), Max: 37.1 (07 Jul 2017 10:42)  T(F): 98.3 (08 Jul 2017 05:35), Max: 98.7 (07 Jul 2017 10:42)  HR: 83 (08 Jul 2017 05:35) (67 - 83)  BP: 181/69 (08 Jul 2017 05:35) (157/60 - 255/78)  BP(mean): 95 (08 Jul 2017 05:35) (95 - 95)  RR: 17 (07 Jul 2017 17:36) (17 - 21)  SpO2: 94% (08 Jul 2017 05:35) (94% - 96%)Daily Height in cm: 157.48 (07 Jul 2017 10:35)    Daily I&O's Summary      PHYSICAL EXAM:    Constitutional: NAD, awake and alert, well-developed  HEENT: PERR, EOMI,  No oral cyananosis.  Neck:  supple,  No JVD  Respiratory: Breath sounds are clear bilaterally, No wheezing, rales or rhonchi  Cardiovascular: S1 and S2, regular rate and rhythm, no Murmurs, gallops or rubs  Gastrointestinal: Bowel Sounds present, soft, nontender.   Extremities: No peripheral edema. No clubbing or cyanosis.  Vascular: 2+ peripheral pulses  Neurological: A/O x 3, no focal deficits  Musculoskeletal: no calf tenderness.  Skin: No rashes.      LABS: All Labs Reviewed:                        11.7   8.1   )-----------( 266      ( 08 Jul 2017 04:09 )             35.1                         13.4   10.1  )-----------( 302      ( 07 Jul 2017 10:50 )             40.6     08 Jul 2017 04:09    138    |  104    |  15     ----------------------------<  93     3.9     |  27     |  0.64   07 Jul 2017 10:50    136    |  103    |  13     ----------------------------<  98     4.1     |  28     |  0.76     Ca    8.5        08 Jul 2017 04:09  Ca    9.1        07 Jul 2017 10:50  Phos  2.9       08 Jul 2017 04:09  Mg     2.0       08 Jul 2017 04:09    TPro  7.2    /  Alb  3.6    /  TBili  0.4    /  DBili  x      /  AST  17     /  ALT  26     /  AlkPhos  101    07 Jul 2017 10:50    PT/INR - ( 07 Jul 2017 10:50 )   PT: 12.1 sec;   INR: 1.12 ratio         PTT - ( 07 Jul 2017 10:50 )  PTT:31.5 sec  CARDIAC MARKERS ( 08 Jul 2017 04:09 )  <0.015 ng/mL / x     / 49 U/L / x     / x      CARDIAC MARKERS ( 07 Jul 2017 19:31 )  <0.015 ng/mL / x     / 55 U/L / x     / x      CARDIAC MARKERS ( 07 Jul 2017 10:50 )  <0.015 ng/mL / x     / 60 U/L / x     / x          Blood Culture:   07-07 @ 10:50  Pro Bnp [id number]        RADIOLOGY/EKG:< from: 12 Lead ECG (07.07.17 @ 10:39) >  Diagnosis Line Normal sinus rhythm  Possible Left atrial enlargement  Possible Inferior infarct , age undetermined  Cannot rule out Anterior infarct , age undetermined  ST & T wave abnormality, consider lateral ischemia  Abnormal ECG    Confirmed by Betito Porter MD ([id number]) on 7/7/2017 6:07:40 PM    < end of copied text >        ECHO   Summary     Left ventricular wall motion is normal.   Estimated left ventricular ejection fraction is 60 %.   Normal appearing left atrium.   Normal appearing right atrium.   Normal appearing right ventricle structure and function.   Mild fibrocalcific changes noted to the aortic valve leaflets with   preserved excursion.   Mild (1+) mitral regurgitation is present.   Mild mitral annular calcification is present.   Fibrocalcific changes noted to the mitral valve leaflets with preserved   leaflet excursion.   EA reversal of the mitral inflow consistent with reduced compliance of   the   left ventricle   Trace tricuspid valve regurgitation is present.   No evidence of pericardial effusion.      ekg 10/10/17  Ventricular Rate 77 BPM    Atrial Rate 77 BPM    P-R Interval 128 ms    QRS Duration 106 ms    Q-T Interval 398 ms    QTC Calculation(Bezet) 450 ms    P Axis 69 degrees    R Axis 38 degrees    T Axis 95 degrees    Diagnosis Line Sinus rhythm with Premature atrial complexes with Aberrant conduction  Possible Inferior infarct (cited on or before 07-JUL-2017)  ST & T wave abnormality, consider lateral ischemia  Abnormal ECG  When compared with ECG of 10-OCT-2017 08:47,  No significant change was found  Confirmed by JOSE GORDILLO MD [id number]) on 10/10/2017 9:44:17 PM
REASON FOR VISIT: PAF    HPI:  65 y/o woman with a history of HTN, CAD (Inferior Wall MI with PCI of RCA), HLD, PVD, AAA, lung nodule s/p recent resection, admitted 10/10/17 with uncontrolled HTN, PAF.    10/15/17:  Comfortable; anticipating discharge home; + fatigue and generalized weakness; no angina or dyspnea.    MEDICATIONS  (STANDING):  acetaminophen   Tablet. 650 milliGRAM(s) Oral every 6 hours  ALBUTerol/ipratropium for Nebulization 3 milliLiter(s) Nebulizer three times a day  apixaban 2.5 milliGRAM(s) Oral every 12 hours  aspirin  chewable 81 milliGRAM(s) Oral daily  atorvastatin 40 milliGRAM(s) Oral at bedtime  buDESOnide 160 MICROgram(s)/formoterol 4.5 MICROgram(s) Inhaler 2 Puff(s) Inhalation two times a day  diltiazem    milliGRAM(s) Oral daily  docusate sodium 100 milliGRAM(s) Oral three times a day  influenza   Vaccine 0.5 milliLiter(s) IntraMuscular once  lactobacillus acidophilus 1 Tablet(s) Oral daily  lisinopril 2.5 milliGRAM(s) Oral daily  metoprolol 50 milliGRAM(s) Oral two times a day  multivitamin 1 Tablet(s) Oral daily  pantoprazole    Tablet 40 milliGRAM(s) Oral before breakfast    MEDICATIONS  (PRN):  ondansetron Injectable 4 milliGRAM(s) IV Push every 6 hours PRN Nausea  senna 2 Tablet(s) Oral at bedtime PRN Constipation    Vital Signs Last 24 Hrs  T(C): 36.6 (15 Oct 2017 05:24), Max: 37.1 (14 Oct 2017 10:08)  T(F): 97.8 (15 Oct 2017 05:24), Max: 98.7 (14 Oct 2017 10:08)  HR: 81 (15 Oct 2017 05:24) (72 - 90)  BP: 140/59 (15 Oct 2017 05:24) (94/54 - 140/59)  RR: 18 (15 Oct 2017 05:24) (18 - 18)  SpO2: 97% (15 Oct 2017 05:24) (96% - 99%)    PHYSICAL EXAM:  Constitutional: Seated in bedside chair, well-appearing  HEENT: EOMI,  No oral cyanosis.  Neck:  supple,  No JVD  Respiratory: Breath sounds are clear bilaterally, No wheezing, rales or rhonchi  Cardiovascular: S1 and S2, regular rate and rhythm  Gastrointestinal: Bowel Sounds present, soft, nontender.   Extremities: No pedal edema.   Skin: No rashes.    LABS:   CARDIAC MARKERS ( 13 Oct 2017 23:25 ) <0.015 ng/mL / x     / x     / x     / x        Transthoracic Echocardiogram (07.08.17 @ 09:43):  Left ventricular wall motion is normal.  Estimated left ventricular ejection fraction is 60 %.  Mild (1+) mitral regurgitation is present.  EA reversal of the mitral inflow consistent with reduced compliance of the left ventricle  Trace tricuspid valve regurgitation is present.  No evidence of pericardial effusion.    12 Lead ECG (10.13.17 @ 23:36): Atrial fibrillation with rapid ventricular response. Cannot rule out Inferior infarct , age undetermined. ST & T wave abnormality, consider lateral ischemia    Tele: Normal sinus rhythm
hungry  no abd pain  no n/v  no melena  no brbpr       Allergies    predniSONE (Other (Moderate))    Intolerances        MEDICATIONS  (STANDING):  ALBUTerol/ipratropium for Nebulization 3 milliLiter(s) Nebulizer three times a day  apixaban 2.5 milliGRAM(s) Oral every 12 hours  aspirin  chewable 81 milliGRAM(s) Oral daily  atorvastatin 40 milliGRAM(s) Oral at bedtime  buDESOnide 160 MICROgram(s)/formoterol 4.5 MICROgram(s) Inhaler 2 Puff(s) Inhalation two times a day  diltiazem    milliGRAM(s) Oral daily  docusate sodium 100 milliGRAM(s) Oral three times a day  gabapentin 300 milliGRAM(s) Oral two times a day  influenza   Vaccine 0.5 milliLiter(s) IntraMuscular once  lactobacillus acidophilus 1 Tablet(s) Oral daily  losartan 25 milliGRAM(s) Oral daily  metoprolol 75 milliGRAM(s) Oral two times a day  multivitamin 1 Tablet(s) Oral daily  pantoprazole    Tablet 40 milliGRAM(s) Oral before breakfast    MEDICATIONS  (PRN):  acetaminophen   Tablet 650 milliGRAM(s) Oral every 6 hours PRN For Temp greater than 38 C (100.4 F)  ondansetron Injectable 4 milliGRAM(s) IV Push every 6 hours PRN Nausea  senna 2 Tablet(s) Oral at bedtime PRN Constipation      REVIEW OF SYSTEMS      General:	No fever or chills    Skin/Breast: No jaundice or rash   		  ENMT: denies sore throat or thrush    Respiratory and Thorax: Denies dyspnea or cough or shortness of breath  	  Cardiovascular: Denies chest pain or palpitations 	    Gastrointestinal: Denies jaundice or pruritis    Genitourinary: Denies dysuria or hematuria	    Musculoskeletal: Denies muscular pain or swelling	    Neurological: Denies confusion or tremor	    Hematology/Lymphatics: Denies easy bruising or bleeding 	    Endocrine:	Denies polyphagia or polyuria    See above hx otherwise neg for any major organ systems    PHYSICAL EXAM:    Vital Signs Last 24 Hrs  T(C): 36.8 (12 Oct 2017 10:22), Max: 37.2 (11 Oct 2017 21:20)  T(F): 98.2 (12 Oct 2017 10:22), Max: 98.9 (11 Oct 2017 21:20)  HR: 74 (12 Oct 2017 10:22) (72 - 130)  BP: 96/46 (12 Oct 2017 10:22) (96/46 - 145/85)  BP(mean): 71 (12 Oct 2017 05:31) (71 - 71)  RR: 18 (12 Oct 2017 10:22) (18 - 18)  SpO2: 94% (12 Oct 2017 10:22) (91% - 96%)    Constitutional: no acute distress    ENMT: NC/AT scl anicteric opm pink no lesions     Neck: supple. No jvd or LN    Respiratory: Clear     Cardiovascular: RRR s1s2     Gastrointestinal: Pos bs , soft , not tender, no hepatosplenomegaly,  no mass      Back: No CVA tenderness    Extremities: NO cce     Neurological: Alert and oriented x 3     Skin: No rash or jaundice    Date/Time:10-12 @ 06:02    ALT/SGPT: --  Albumin: --  AST/SGOT: --  Bilirubin Direct: --  Bilirubin Total: --  Ca: 8.3  eGFR : 105  eGFR Non-: 90  Lipase: --  Amylase: --  INR: --  PTT: --        Date/Time:10-11 @ 06:53    ALT/SGPT: --  Albumin: --  AST/SGOT: --  Bilirubin Direct: --  Bilirubin Total: --  Ca: 8.5  eGFR : 106  eGFR Non-: 91  Lipase: --  Amylase: --  INR: 1.48  PTT: --        Date/Time:10-10 @ 08:53    ALT/SGPT: 31  Albumin: 2.7  AST/SGOT: 24  Bilirubin Direct: --  Bilirubin Total: 0.5  Ca: 9.0  eGFR : 98  eGFR Non-: 85  Lipase: --  Amylase: --  INR: 1.20  PTT: --            10-12    134<L>  |  101  |  9   ----------------------------<  102<H>  4.1   |  25  |  0.53    Ca    8.3<L>      12 Oct 2017 06:02  Mg     1.7     10-11                              9.5    11.0  )-----------( 324      ( 12 Oct 2017 06:02 )             29.4

## 2017-10-15 NOTE — PROGRESS NOTE ADULT - PROBLEM SELECTOR PROBLEM 3
Anemia, unspecified type
Coronary artery disease involving native coronary artery of native heart without angina pectoris

## 2017-10-15 NOTE — DISCHARGE NOTE ADULT - MEDICATION SUMMARY - MEDICATIONS TO STOP TAKING
I will STOP taking the medications listed below when I get home from the hospital:    Plavix 75 mg oral tablet  -- 1 tab(s) by mouth once a day    Cozaar 25 mg oral tablet  -- 25 milligram(s) by mouth 2 times a day

## 2017-10-15 NOTE — DISCHARGE NOTE ADULT - HOSPITAL COURSE
65 y/o woman with a history of HTN, CAD (Inferior Wall MI with PCI of RCA), HLD, PVD, AAA, lung nodule s/p recent resection, admitted 10/10/17 with uncontrolled HTN, PAF. pt admitted to tele and monitor with medications adjusted for optimization of HR.  pt now rate controlled with hgb stable on eliquis.  pt aware she needs f/u with [facility] dr aguillon given recent resection.    ICU Vital Signs Last 24 Hrs  T(C): 36.9 (15 Oct 2017 10:19), Max: 37.1 (14 Oct 2017 20:00)  T(F): 98.4 (15 Oct 2017 10:19), Max: 98.7 (14 Oct 2017 20:00)  HR: 68 (15 Oct 2017 10:19) (68 - 90)  BP: 105/52 (15 Oct 2017 10:19) (105/52 - 140/59)  BP(mean): --  ABP: --  ABP(mean): --  RR: 18 (15 Oct 2017 10:19) (18 - 18)  SpO2: 95% (15 Oct 2017 10:19) (95% - 99%)    GEN: lying in bed, NAD  HEENT:   NC/AT, pupils equal and reactive, EOMI  CV:  +S1, +S2, RRR  RESP:   lungs clear to auscultation bilaterally, no wheeze, rales, rhonchi   CHEST - dressing in place with very minimal oozing at surgical site   GI:  abdomen soft, non-tender, non-distended, normoactive BS  RECTAL:  not examined  :  not examined  MSK:   normal muscle tone  EXT:  no edema  NEURO:  AAOX3, no focal neurological deficits  SKIN:  no rashes          total time spent on dc 40 mins

## 2017-10-15 NOTE — DISCHARGE NOTE ADULT - CARE PROVIDER_API CALL
Devaughn Obrien (MD), Cardiovascular Disease  [address] [place] [address]  Phone: [phone]  Fax: [phone]    dr aguillon at [facility],   Phone: (   )    -  Fax: (   )    -

## 2017-10-15 NOTE — PROGRESS NOTE ADULT - PROBLEM SELECTOR PROBLEM 2
Malignant neoplasm of lower lobe of left lung
Coronary artery disease involving native coronary artery of native heart without angina pectoris
Essential hypertension

## 2017-10-15 NOTE — DISCHARGE NOTE ADULT - CARE PROVIDERS DIRECT ADDRESSES
[email or web address],DirectAddress_Unknown

## 2017-10-15 NOTE — DISCHARGE NOTE ADULT - PROVIDER TOKENS
TOKEN:'[id number]',FREE:[LAST:[dr aguillon at [facility]],PHONE:[(   )    -],FAX:[(   )    -]]

## 2017-10-15 NOTE — DISCHARGE NOTE ADULT - MEDICATION SUMMARY - MEDICATIONS TO TAKE
I will START or STAY ON the medications listed below when I get home from the hospital:    aspirin 81 mg oral tablet  -- 1 tab(s) by mouth once a day  -- Indication: For Heart medicine    lisinopril 2.5 mg oral tablet  -- 1 tab(s) by mouth once a day  -- Indication: For Heart medicine    dilTIAZem 180 mg/24 hours oral capsule, extended release  -- 1 cap(s) by mouth once a day  -- Indication: For Heart rate control    apixaban 2.5 mg oral tablet  -- 1 tab(s) by mouth every 12 hours  -- Indication: For blood thinner    Lipitor 80 mg oral tablet  -- 1 tab(s) by mouth once a day  -- Indication: For Heart medicine    Lopressor 50 mg oral tablet  -- 1 tab(s) by mouth 2 times a day  -- Indication: For Heart rate control    Advair Diskus 250 mcg-50 mcg inhalation powder  -- 1 puff(s) inhaled 2 times a day  -- Indication: For for your lungs    Multiple Vitamins oral tablet  -- 1 tab(s) by mouth once a day  -- Indication: For vitamin

## 2017-10-15 NOTE — PROGRESS NOTE ADULT - PROBLEM SELECTOR PLAN 1
Has been in sinus rhythm for > 24 hours; tolerating anticoagulation; continue Eliquis, metoprolol, Cardizem

## 2017-10-15 NOTE — DISCHARGE NOTE ADULT - CARE PLAN
Principal Discharge DX:	PAF (paroxysmal atrial fibrillation)  Goal:	control heart rate  Instructions for follow-up, activity and diet:	follow up with dr doe this week and take your new medications as prescirbed

## 2017-10-18 DIAGNOSIS — E78.5 HYPERLIPIDEMIA, UNSPECIFIED: ICD-10-CM

## 2017-10-18 DIAGNOSIS — I10 ESSENTIAL (PRIMARY) HYPERTENSION: ICD-10-CM

## 2017-10-18 DIAGNOSIS — Z79.82 LONG TERM (CURRENT) USE OF ASPIRIN: ICD-10-CM

## 2017-10-18 DIAGNOSIS — Z79.01 LONG TERM (CURRENT) USE OF ANTICOAGULANTS: ICD-10-CM

## 2017-10-18 DIAGNOSIS — K21.9 GASTRO-ESOPHAGEAL REFLUX DISEASE WITHOUT ESOPHAGITIS: ICD-10-CM

## 2017-10-18 DIAGNOSIS — I48.0 PAROXYSMAL ATRIAL FIBRILLATION: ICD-10-CM

## 2017-10-18 DIAGNOSIS — Z87.891 PERSONAL HISTORY OF NICOTINE DEPENDENCE: ICD-10-CM

## 2017-10-18 DIAGNOSIS — I25.2 OLD MYOCARDIAL INFARCTION: ICD-10-CM

## 2017-10-18 DIAGNOSIS — I25.10 ATHEROSCLEROTIC HEART DISEASE OF NATIVE CORONARY ARTERY WITHOUT ANGINA PECTORIS: ICD-10-CM

## 2017-10-18 DIAGNOSIS — Z85.118 PERSONAL HISTORY OF OTHER MALIGNANT NEOPLASM OF BRONCHUS AND LUNG: ICD-10-CM

## 2017-10-18 DIAGNOSIS — I71.4 ABDOMINAL AORTIC ANEURYSM, WITHOUT RUPTURE: ICD-10-CM

## 2017-10-18 DIAGNOSIS — I73.9 PERIPHERAL VASCULAR DISEASE, UNSPECIFIED: ICD-10-CM

## 2017-10-18 DIAGNOSIS — I48.91 UNSPECIFIED ATRIAL FIBRILLATION: ICD-10-CM

## 2017-10-19 ENCOUNTER — EMERGENCY (EMERGENCY)
Facility: HOSPITAL | Age: 77
LOS: 0 days | Discharge: HOME | End: 2017-10-19
Attending: EMERGENCY MEDICINE | Admitting: EMERGENCY MEDICINE
Payer: MEDICARE

## 2017-10-19 VITALS — WEIGHT: 110.01 LBS | HEIGHT: 62 IN

## 2017-10-19 VITALS
SYSTOLIC BLOOD PRESSURE: 128 MMHG | TEMPERATURE: 99 F | DIASTOLIC BLOOD PRESSURE: 63 MMHG | RESPIRATION RATE: 23 BRPM | HEART RATE: 75 BPM

## 2017-10-19 DIAGNOSIS — I25.10 ATHEROSCLEROTIC HEART DISEASE OF NATIVE CORONARY ARTERY WITHOUT ANGINA PECTORIS: ICD-10-CM

## 2017-10-19 DIAGNOSIS — C34.32 MALIGNANT NEOPLASM OF LOWER LOBE, LEFT BRONCHUS OR LUNG: ICD-10-CM

## 2017-10-19 DIAGNOSIS — I48.91 UNSPECIFIED ATRIAL FIBRILLATION: ICD-10-CM

## 2017-10-19 DIAGNOSIS — R53.1 WEAKNESS: ICD-10-CM

## 2017-10-19 DIAGNOSIS — I10 ESSENTIAL (PRIMARY) HYPERTENSION: ICD-10-CM

## 2017-10-19 DIAGNOSIS — Z79.01 LONG TERM (CURRENT) USE OF ANTICOAGULANTS: ICD-10-CM

## 2017-10-19 DIAGNOSIS — R53.83 OTHER FATIGUE: ICD-10-CM

## 2017-10-19 DIAGNOSIS — I25.2 OLD MYOCARDIAL INFARCTION: ICD-10-CM

## 2017-10-19 LAB
ALBUMIN SERPL ELPH-MCNC: 2.5 G/DL — LOW (ref 3.3–5)
ALP SERPL-CCNC: 109 U/L — SIGNIFICANT CHANGE UP (ref 40–120)
ALT FLD-CCNC: 54 U/L — SIGNIFICANT CHANGE UP (ref 12–78)
ANION GAP SERPL CALC-SCNC: 4 MMOL/L — LOW (ref 5–17)
APPEARANCE UR: CLEAR — SIGNIFICANT CHANGE UP
APTT BLD: 32 SEC — SIGNIFICANT CHANGE UP (ref 27.5–37.4)
AST SERPL-CCNC: 51 U/L — HIGH (ref 15–37)
BASOPHILS # BLD AUTO: 0.2 K/UL — SIGNIFICANT CHANGE UP (ref 0–0.2)
BASOPHILS NFR BLD AUTO: 1.8 % — SIGNIFICANT CHANGE UP (ref 0–2)
BILIRUB SERPL-MCNC: 0.3 MG/DL — SIGNIFICANT CHANGE UP (ref 0.2–1.2)
BILIRUB UR-MCNC: NEGATIVE — SIGNIFICANT CHANGE UP
BLD GP AB SCN SERPL QL: SIGNIFICANT CHANGE UP
BUN SERPL-MCNC: 12 MG/DL — SIGNIFICANT CHANGE UP (ref 7–23)
CALCIUM SERPL-MCNC: 8.8 MG/DL — SIGNIFICANT CHANGE UP (ref 8.5–10.1)
CHLORIDE SERPL-SCNC: 99 MMOL/L — SIGNIFICANT CHANGE UP (ref 96–108)
CO2 SERPL-SCNC: 29 MMOL/L — SIGNIFICANT CHANGE UP (ref 22–31)
COLOR SPEC: YELLOW — SIGNIFICANT CHANGE UP
CREAT SERPL-MCNC: 0.74 MG/DL — SIGNIFICANT CHANGE UP (ref 0.5–1.3)
DIFF PNL FLD: (no result)
EOSINOPHIL # BLD AUTO: 0.2 K/UL — SIGNIFICANT CHANGE UP (ref 0–0.5)
EOSINOPHIL NFR BLD AUTO: 2.3 % — SIGNIFICANT CHANGE UP (ref 0–6)
GLUCOSE SERPL-MCNC: 104 MG/DL — HIGH (ref 70–99)
GLUCOSE UR QL: NEGATIVE MG/DL — SIGNIFICANT CHANGE UP
HCT VFR BLD CALC: 29.3 % — LOW (ref 34.5–45)
HGB BLD-MCNC: 9.5 G/DL — LOW (ref 11.5–15.5)
INR BLD: 1.69 RATIO — HIGH (ref 0.88–1.16)
KETONES UR-MCNC: NEGATIVE — SIGNIFICANT CHANGE UP
LEUKOCYTE ESTERASE UR-ACNC: NEGATIVE — SIGNIFICANT CHANGE UP
LYMPHOCYTES # BLD AUTO: 1.3 K/UL — SIGNIFICANT CHANGE UP (ref 1–3.3)
LYMPHOCYTES # BLD AUTO: 14 % — SIGNIFICANT CHANGE UP (ref 13–44)
MCHC RBC-ENTMCNC: 28.1 PG — SIGNIFICANT CHANGE UP (ref 27–34)
MCHC RBC-ENTMCNC: 32.4 GM/DL — SIGNIFICANT CHANGE UP (ref 32–36)
MCV RBC AUTO: 86.7 FL — SIGNIFICANT CHANGE UP (ref 80–100)
MONOCYTES # BLD AUTO: 0.8 K/UL — SIGNIFICANT CHANGE UP (ref 0–0.9)
MONOCYTES NFR BLD AUTO: 8.9 % — SIGNIFICANT CHANGE UP (ref 2–14)
NEUTROPHILS # BLD AUTO: 6.7 K/UL — SIGNIFICANT CHANGE UP (ref 1.8–7.4)
NEUTROPHILS NFR BLD AUTO: 73 % — SIGNIFICANT CHANGE UP (ref 43–77)
NITRITE UR-MCNC: NEGATIVE — SIGNIFICANT CHANGE UP
NT-PROBNP SERPL-SCNC: 5829 PG/ML — HIGH (ref 0–450)
PH UR: 8 — SIGNIFICANT CHANGE UP (ref 5–8)
PLATELET # BLD AUTO: 653 K/UL — HIGH (ref 150–400)
POTASSIUM SERPL-MCNC: 4.6 MMOL/L — SIGNIFICANT CHANGE UP (ref 3.5–5.3)
POTASSIUM SERPL-SCNC: 4.6 MMOL/L — SIGNIFICANT CHANGE UP (ref 3.5–5.3)
PROT SERPL-MCNC: 6.9 GM/DL — SIGNIFICANT CHANGE UP (ref 6–8.3)
PROT UR-MCNC: 30 MG/DL
PROTHROM AB SERPL-ACNC: 18.5 SEC — HIGH (ref 9.8–12.7)
RBC # BLD: 3.38 M/UL — LOW (ref 3.8–5.2)
RBC # FLD: 14.5 % — SIGNIFICANT CHANGE UP (ref 10.3–14.5)
RBC CASTS # UR COMP ASSIST: SIGNIFICANT CHANGE UP /HPF (ref 0–4)
SODIUM SERPL-SCNC: 132 MMOL/L — LOW (ref 135–145)
SP GR SPEC: 1.01 — SIGNIFICANT CHANGE UP (ref 1.01–1.02)
TROPONIN I SERPL-MCNC: <0.015 NG/ML — SIGNIFICANT CHANGE UP (ref 0.01–0.04)
TYPE + AB SCN PNL BLD: SIGNIFICANT CHANGE UP
UROBILINOGEN FLD QL: NEGATIVE MG/DL — SIGNIFICANT CHANGE UP
WBC # BLD: 9.2 K/UL — SIGNIFICANT CHANGE UP (ref 3.8–10.5)
WBC # FLD AUTO: 9.2 K/UL — SIGNIFICANT CHANGE UP (ref 3.8–10.5)
WBC UR QL: NEGATIVE — SIGNIFICANT CHANGE UP

## 2017-10-19 PROCEDURE — 99285 EMERGENCY DEPT VISIT HI MDM: CPT

## 2017-10-19 PROCEDURE — 71010: CPT | Mod: 26

## 2017-10-19 PROCEDURE — 93010 ELECTROCARDIOGRAM REPORT: CPT

## 2017-10-19 NOTE — ED PROVIDER NOTE - OBJECTIVE STATEMENT
76 yo female c/o generalized weakness and fatigue. +decreased energy. denies fever or chills. denies chest pain. no appetite.  [place] with [place] [facility] and was admitted to  last week for rapid afib. Patient was rate controlled and started on eliquis. Patient disscharged home x 4 days ago. Reports no energy at home.   Called cardiologist this am, and was sent to ED and told to see Dr. De Jesus

## 2017-10-19 NOTE — ED ADULT TRIAGE NOTE - CHIEF COMPLAINT QUOTE
Pt. to the ED BIB Family and Sent over by Dr. Abernathy ( Cardiologist) for C/O Weakness and Fatigue x 2 days- Pt. denies Fevers. Chills. CP and SOB- Hx. of  Recent Partial Lobectomy ( Lung Ca) and Afib which patient states it is under control. Pt. denies Chemotherapy

## 2017-10-20 LAB
CULTURE RESULTS: SIGNIFICANT CHANGE UP
SPECIMEN SOURCE: SIGNIFICANT CHANGE UP

## 2017-11-20 ENCOUNTER — RX RENEWAL (OUTPATIENT)
Age: 77
End: 2017-11-20

## 2017-11-29 ENCOUNTER — RX RENEWAL (OUTPATIENT)
Age: 77
End: 2017-11-29

## 2017-12-05 ENCOUNTER — OTHER (OUTPATIENT)
Age: 77
End: 2017-12-05

## 2018-01-19 ENCOUNTER — OTHER (OUTPATIENT)
Age: 78
End: 2018-01-19

## 2018-01-19 RX ORDER — FAMOTIDINE 20 MG/1
20 TABLET, FILM COATED ORAL DAILY
Qty: 180 | Refills: 3 | Status: DISCONTINUED | COMMUNITY
Start: 2017-12-05 | End: 2018-01-19

## 2018-01-19 RX ORDER — AMLODIPINE BESYLATE 5 MG/1
5 TABLET ORAL
Qty: 60 | Refills: 0 | Status: DISCONTINUED | COMMUNITY
Start: 2017-07-09 | End: 2018-01-19

## 2018-01-30 ENCOUNTER — NON-APPOINTMENT (OUTPATIENT)
Age: 78
End: 2018-01-30

## 2018-01-30 ENCOUNTER — APPOINTMENT (OUTPATIENT)
Dept: CARDIOLOGY | Facility: CLINIC | Age: 78
End: 2018-01-30
Payer: MEDICARE

## 2018-01-30 VITALS
OXYGEN SATURATION: 97 % | HEART RATE: 62 BPM | WEIGHT: 115 LBS | TEMPERATURE: 98.2 F | BODY MASS INDEX: 21.16 KG/M2 | SYSTOLIC BLOOD PRESSURE: 118 MMHG | DIASTOLIC BLOOD PRESSURE: 71 MMHG | HEIGHT: 62 IN

## 2018-01-30 DIAGNOSIS — Z86.2 PERSONAL HISTORY OF DISEASES OF THE BLOOD AND BLOOD-FORMING ORGANS AND CERTAIN DISORDERS INVOLVING THE IMMUNE MECHANISM: ICD-10-CM

## 2018-01-30 DIAGNOSIS — Z01.810 ENCOUNTER FOR PREPROCEDURAL CARDIOVASCULAR EXAMINATION: ICD-10-CM

## 2018-01-30 PROCEDURE — 99215 OFFICE O/P EST HI 40 MIN: CPT | Mod: 25

## 2018-01-30 PROCEDURE — 93000 ELECTROCARDIOGRAM COMPLETE: CPT

## 2018-02-22 ENCOUNTER — FORM ENCOUNTER (OUTPATIENT)
Age: 78
End: 2018-02-22

## 2018-02-23 ENCOUNTER — APPOINTMENT (OUTPATIENT)
Dept: ULTRASOUND IMAGING | Facility: CLINIC | Age: 78
End: 2018-02-23
Payer: MEDICARE

## 2018-02-23 ENCOUNTER — OUTPATIENT (OUTPATIENT)
Dept: OUTPATIENT SERVICES | Facility: HOSPITAL | Age: 78
LOS: 1 days | End: 2018-02-23
Payer: MEDICARE

## 2018-02-23 DIAGNOSIS — Z00.8 ENCOUNTER FOR OTHER GENERAL EXAMINATION: ICD-10-CM

## 2018-02-23 DIAGNOSIS — R60.9 EDEMA, UNSPECIFIED: ICD-10-CM

## 2018-02-23 PROCEDURE — 93970 EXTREMITY STUDY: CPT | Mod: 26

## 2018-02-23 PROCEDURE — 93970 EXTREMITY STUDY: CPT

## 2018-03-05 ENCOUNTER — MEDICATION RENEWAL (OUTPATIENT)
Age: 78
End: 2018-03-05

## 2018-03-05 ENCOUNTER — RX RENEWAL (OUTPATIENT)
Age: 78
End: 2018-03-05

## 2018-03-07 ENCOUNTER — MEDICATION RENEWAL (OUTPATIENT)
Age: 78
End: 2018-03-07

## 2018-03-13 ENCOUNTER — NON-APPOINTMENT (OUTPATIENT)
Age: 78
End: 2018-03-13

## 2018-03-13 ENCOUNTER — APPOINTMENT (OUTPATIENT)
Dept: CARDIOLOGY | Facility: CLINIC | Age: 78
End: 2018-03-13
Payer: MEDICARE

## 2018-03-13 VITALS
SYSTOLIC BLOOD PRESSURE: 160 MMHG | WEIGHT: 117 LBS | TEMPERATURE: 98.3 F | HEIGHT: 62 IN | HEART RATE: 68 BPM | DIASTOLIC BLOOD PRESSURE: 85 MMHG | BODY MASS INDEX: 21.53 KG/M2 | OXYGEN SATURATION: 95 %

## 2018-03-13 PROCEDURE — 36415 COLL VENOUS BLD VENIPUNCTURE: CPT

## 2018-03-13 PROCEDURE — 93000 ELECTROCARDIOGRAM COMPLETE: CPT

## 2018-03-13 PROCEDURE — 99214 OFFICE O/P EST MOD 30 MIN: CPT | Mod: 25

## 2018-03-14 LAB
25(OH)D3 SERPL-MCNC: 29.1 NG/ML
ALBUMIN SERPL ELPH-MCNC: 3.7 G/DL
ALP BLD-CCNC: 98 U/L
ALT SERPL-CCNC: 21 U/L
ANION GAP SERPL CALC-SCNC: 14 MMOL/L
AST SERPL-CCNC: 22 U/L
BASOPHILS # BLD AUTO: 0.04 K/UL
BASOPHILS NFR BLD AUTO: 0.5 %
BILIRUB SERPL-MCNC: <0.2 MG/DL
BUN SERPL-MCNC: 17 MG/DL
CALCIUM SERPL-MCNC: 9.4 MG/DL
CHLORIDE SERPL-SCNC: 100 MMOL/L
CHOLEST SERPL-MCNC: 237 MG/DL
CHOLEST/HDLC SERPL: 3.2 RATIO
CK SERPL-CCNC: 45 U/L
CO2 SERPL-SCNC: 27 MMOL/L
CREAT SERPL-MCNC: 0.88 MG/DL
EOSINOPHIL # BLD AUTO: 0.33 K/UL
EOSINOPHIL NFR BLD AUTO: 4.4 %
FERRITIN SERPL-MCNC: 31 NG/ML
GLUCOSE SERPL-MCNC: 94 MG/DL
HCT VFR BLD CALC: 42.4 %
HDLC SERPL-MCNC: 75 MG/DL
HGB BLD-MCNC: 13.2 G/DL
IMM GRANULOCYTES NFR BLD AUTO: 0.1 %
IRON SATN MFR SERPL: 16 %
IRON SERPL-MCNC: 46 UG/DL
LDLC SERPL CALC-MCNC: 133 MG/DL
LDLC SERPL DIRECT ASSAY-MCNC: 137 MG/DL
LYMPHOCYTES # BLD AUTO: 2.15 K/UL
LYMPHOCYTES NFR BLD AUTO: 28.4 %
MAN DIFF?: NORMAL
MCHC RBC-ENTMCNC: 27.8 PG
MCHC RBC-ENTMCNC: 31.1 GM/DL
MCV RBC AUTO: 89.5 FL
MONOCYTES # BLD AUTO: 1 K/UL
MONOCYTES NFR BLD AUTO: 13.2 %
NEUTROPHILS # BLD AUTO: 4.05 K/UL
NEUTROPHILS NFR BLD AUTO: 53.4 %
PLATELET # BLD AUTO: 304 K/UL
POTASSIUM SERPL-SCNC: 4.3 MMOL/L
PROT SERPL-MCNC: 6.2 G/DL
RBC # BLD: 4.74 M/UL
RBC # FLD: 16.4 %
SODIUM SERPL-SCNC: 141 MMOL/L
TIBC SERPL-MCNC: 282 UG/DL
TRIGL SERPL-MCNC: 146 MG/DL
TSH SERPL-ACNC: 3.96 UIU/ML
UIBC SERPL-MCNC: 236 UG/DL
WBC # FLD AUTO: 7.58 K/UL

## 2018-03-18 LAB
HBA1C MFR BLD HPLC: 5.7 %
TRANSFERRIN SERPL-MCNC: 250 MG/DL

## 2018-04-13 ENCOUNTER — NON-APPOINTMENT (OUTPATIENT)
Age: 78
End: 2018-04-13

## 2018-04-13 ENCOUNTER — APPOINTMENT (OUTPATIENT)
Dept: CARDIOLOGY | Facility: CLINIC | Age: 78
End: 2018-04-13
Payer: MEDICARE

## 2018-04-13 VITALS
OXYGEN SATURATION: 96 % | BODY MASS INDEX: 21.35 KG/M2 | SYSTOLIC BLOOD PRESSURE: 169 MMHG | HEART RATE: 70 BPM | DIASTOLIC BLOOD PRESSURE: 79 MMHG | HEIGHT: 62 IN | WEIGHT: 116 LBS

## 2018-04-13 PROCEDURE — 99214 OFFICE O/P EST MOD 30 MIN: CPT | Mod: 25

## 2018-04-13 PROCEDURE — 93000 ELECTROCARDIOGRAM COMPLETE: CPT

## 2018-04-13 RX ORDER — AMLODIPINE BESYLATE 10 MG/1
10 TABLET ORAL
Qty: 30 | Refills: 0 | Status: COMPLETED | COMMUNITY
Start: 2018-01-23

## 2018-04-13 RX ORDER — POLYETHYLENE GLYCOL 3350 17 G/17G
17 POWDER, FOR SOLUTION ORAL
Qty: 14 | Refills: 0 | Status: COMPLETED | COMMUNITY
Start: 2017-10-26

## 2018-04-13 RX ORDER — DOCUSATE SODIUM 100 MG/1
100 CAPSULE ORAL
Qty: 60 | Refills: 0 | Status: COMPLETED | COMMUNITY
Start: 2017-10-26

## 2018-04-13 RX ORDER — LISINOPRIL 2.5 MG/1
2.5 TABLET ORAL
Qty: 30 | Refills: 0 | Status: COMPLETED | COMMUNITY
Start: 2017-10-15

## 2018-04-13 RX ORDER — OFLOXACIN 3 MG/ML
0.3 SOLUTION/ DROPS OPHTHALMIC
Qty: 5 | Refills: 0 | Status: COMPLETED | COMMUNITY
Start: 2018-02-11

## 2018-04-13 RX ORDER — DILTIAZEM HYDROCHLORIDE 180 MG/1
180 CAPSULE, EXTENDED RELEASE ORAL
Qty: 30 | Refills: 0 | Status: COMPLETED | COMMUNITY
Start: 2017-10-15

## 2018-05-08 ENCOUNTER — TRANSCRIPTION ENCOUNTER (OUTPATIENT)
Age: 78
End: 2018-05-08

## 2018-05-09 ENCOUNTER — APPOINTMENT (OUTPATIENT)
Dept: CARDIOLOGY | Facility: CLINIC | Age: 78
End: 2018-05-09
Payer: MEDICARE

## 2018-05-09 VITALS
HEIGHT: 62 IN | WEIGHT: 117 LBS | BODY MASS INDEX: 21.53 KG/M2 | TEMPERATURE: 98.9 F | HEART RATE: 70 BPM | OXYGEN SATURATION: 95 %

## 2018-05-09 VITALS — DIASTOLIC BLOOD PRESSURE: 94 MMHG | SYSTOLIC BLOOD PRESSURE: 194 MMHG

## 2018-05-09 PROCEDURE — 99214 OFFICE O/P EST MOD 30 MIN: CPT

## 2018-05-09 RX ORDER — NIFEDIPINE 30 MG/1
30 TABLET, EXTENDED RELEASE ORAL DAILY
Qty: 90 | Refills: 3 | Status: DISCONTINUED | COMMUNITY
Start: 2018-04-13 | End: 2018-05-09

## 2018-05-14 LAB — BACTERIA THROAT CULT: NORMAL

## 2018-05-23 ENCOUNTER — APPOINTMENT (OUTPATIENT)
Dept: CARDIOLOGY | Facility: CLINIC | Age: 78
End: 2018-05-23

## 2018-06-19 ENCOUNTER — NON-APPOINTMENT (OUTPATIENT)
Age: 78
End: 2018-06-19

## 2018-06-19 ENCOUNTER — APPOINTMENT (OUTPATIENT)
Dept: CARDIOLOGY | Facility: CLINIC | Age: 78
End: 2018-06-19
Payer: MEDICARE

## 2018-06-19 VITALS
BODY MASS INDEX: 20.98 KG/M2 | HEART RATE: 72 BPM | WEIGHT: 114 LBS | HEIGHT: 62 IN | OXYGEN SATURATION: 97 % | SYSTOLIC BLOOD PRESSURE: 133 MMHG | DIASTOLIC BLOOD PRESSURE: 66 MMHG

## 2018-06-19 DIAGNOSIS — Z86.2 PERSONAL HISTORY OF DISEASES OF THE BLOOD AND BLOOD-FORMING ORGANS AND CERTAIN DISORDERS INVOLVING THE IMMUNE MECHANISM: ICD-10-CM

## 2018-06-19 DIAGNOSIS — Z87.898 PERSONAL HISTORY OF OTHER SPECIFIED CONDITIONS: ICD-10-CM

## 2018-06-19 DIAGNOSIS — C34.90 MALIGNANT NEOPLASM OF UNSPECIFIED PART OF UNSPECIFIED BRONCHUS OR LUNG: ICD-10-CM

## 2018-06-19 DIAGNOSIS — Z87.09 PERSONAL HISTORY OF OTHER DISEASES OF THE RESPIRATORY SYSTEM: ICD-10-CM

## 2018-06-19 PROCEDURE — 99214 OFFICE O/P EST MOD 30 MIN: CPT | Mod: 25

## 2018-06-19 PROCEDURE — 93000 ELECTROCARDIOGRAM COMPLETE: CPT

## 2018-11-01 ENCOUNTER — INPATIENT (INPATIENT)
Facility: HOSPITAL | Age: 78
LOS: 1 days | Discharge: HOME | End: 2018-11-03
Attending: INTERNAL MEDICINE | Admitting: INTERNAL MEDICINE
Payer: MEDICARE

## 2018-11-01 ENCOUNTER — OUTPATIENT (OUTPATIENT)
Dept: OUTPATIENT SERVICES | Facility: HOSPITAL | Age: 78
LOS: 1 days | Discharge: HOME | End: 2018-11-01
Payer: MEDICARE

## 2018-11-01 VITALS
DIASTOLIC BLOOD PRESSURE: 65 MMHG | TEMPERATURE: 98 F | RESPIRATION RATE: 17 BRPM | OXYGEN SATURATION: 96 % | SYSTOLIC BLOOD PRESSURE: 136 MMHG | HEART RATE: 67 BPM

## 2018-11-01 DIAGNOSIS — D50.8 OTHER IRON DEFICIENCY ANEMIAS: ICD-10-CM

## 2018-11-01 LAB
ANION GAP SERPL CALC-SCNC: 5 MMOL/L — SIGNIFICANT CHANGE UP (ref 5–17)
APTT BLD: 30.7 SEC — SIGNIFICANT CHANGE UP (ref 27.5–36.3)
BASOPHILS # BLD AUTO: 0.04 K/UL — SIGNIFICANT CHANGE UP (ref 0–0.2)
BASOPHILS # BLD AUTO: 0.05 K/UL — SIGNIFICANT CHANGE UP (ref 0–0.2)
BASOPHILS NFR BLD AUTO: 0.5 % — SIGNIFICANT CHANGE UP (ref 0–2)
BASOPHILS NFR BLD AUTO: 0.6 % — SIGNIFICANT CHANGE UP (ref 0–2)
BLD GP AB SCN SERPL QL: SIGNIFICANT CHANGE UP
BUN SERPL-MCNC: 12 MG/DL — SIGNIFICANT CHANGE UP (ref 7–23)
CALCIUM SERPL-MCNC: 8.9 MG/DL — SIGNIFICANT CHANGE UP (ref 8.5–10.1)
CHLORIDE SERPL-SCNC: 102 MMOL/L — SIGNIFICANT CHANGE UP (ref 96–108)
CO2 SERPL-SCNC: 30 MMOL/L — SIGNIFICANT CHANGE UP (ref 22–31)
CREAT SERPL-MCNC: 0.8 MG/DL — SIGNIFICANT CHANGE UP (ref 0.5–1.3)
EOSINOPHIL # BLD AUTO: 0.11 K/UL — SIGNIFICANT CHANGE UP (ref 0–0.5)
EOSINOPHIL # BLD AUTO: 0.19 K/UL — SIGNIFICANT CHANGE UP (ref 0–0.5)
EOSINOPHIL NFR BLD AUTO: 1.4 % — SIGNIFICANT CHANGE UP (ref 0–6)
EOSINOPHIL NFR BLD AUTO: 2.6 % — SIGNIFICANT CHANGE UP (ref 0–6)
GLUCOSE SERPL-MCNC: 110 MG/DL — HIGH (ref 70–99)
HCT VFR BLD CALC: 25.8 % — LOW (ref 34.5–45)
HCT VFR BLD CALC: 26.2 % — LOW (ref 34.5–45)
HGB BLD-MCNC: 7.5 G/DL — LOW (ref 11.5–15.5)
HGB BLD-MCNC: 7.7 G/DL — LOW (ref 11.5–15.5)
IMM GRANULOCYTES NFR BLD AUTO: 0.3 % — SIGNIFICANT CHANGE UP (ref 0–1.5)
IMM GRANULOCYTES NFR BLD AUTO: 0.3 % — SIGNIFICANT CHANGE UP (ref 0–1.5)
INR BLD: 1.85 RATIO — HIGH (ref 0.88–1.16)
LYMPHOCYTES # BLD AUTO: 0.84 K/UL — LOW (ref 1–3.3)
LYMPHOCYTES # BLD AUTO: 1.27 K/UL — SIGNIFICANT CHANGE UP (ref 1–3.3)
LYMPHOCYTES # BLD AUTO: 11.5 % — LOW (ref 13–44)
LYMPHOCYTES # BLD AUTO: 16.4 % — SIGNIFICANT CHANGE UP (ref 13–44)
MCHC RBC-ENTMCNC: 24.4 PG — LOW (ref 27–34)
MCHC RBC-ENTMCNC: 24.4 PG — LOW (ref 27–34)
MCHC RBC-ENTMCNC: 29.1 GM/DL — LOW (ref 32–36)
MCHC RBC-ENTMCNC: 29.4 GM/DL — LOW (ref 32–36)
MCV RBC AUTO: 83.2 FL — SIGNIFICANT CHANGE UP (ref 80–100)
MCV RBC AUTO: 83.8 FL — SIGNIFICANT CHANGE UP (ref 80–100)
MONOCYTES # BLD AUTO: 0.91 K/UL — HIGH (ref 0–0.9)
MONOCYTES # BLD AUTO: 1.06 K/UL — HIGH (ref 0–0.9)
MONOCYTES NFR BLD AUTO: 12.5 % — SIGNIFICANT CHANGE UP (ref 2–14)
MONOCYTES NFR BLD AUTO: 13.7 % — SIGNIFICANT CHANGE UP (ref 2–14)
NEUTROPHILS # BLD AUTO: 5.23 K/UL — SIGNIFICANT CHANGE UP (ref 1.8–7.4)
NEUTROPHILS # BLD AUTO: 5.28 K/UL — SIGNIFICANT CHANGE UP (ref 1.8–7.4)
NEUTROPHILS NFR BLD AUTO: 67.6 % — SIGNIFICANT CHANGE UP (ref 43–77)
NEUTROPHILS NFR BLD AUTO: 72.6 % — SIGNIFICANT CHANGE UP (ref 43–77)
NRBC # BLD: 0 /100 WBCS — SIGNIFICANT CHANGE UP (ref 0–0)
NRBC # BLD: 0 /100 WBCS — SIGNIFICANT CHANGE UP (ref 0–0)
PLATELET # BLD AUTO: 380 K/UL — SIGNIFICANT CHANGE UP (ref 150–400)
PLATELET # BLD AUTO: 380 K/UL — SIGNIFICANT CHANGE UP (ref 150–400)
POTASSIUM SERPL-MCNC: 3.7 MMOL/L — SIGNIFICANT CHANGE UP (ref 3.5–5.3)
POTASSIUM SERPL-SCNC: 3.7 MMOL/L — SIGNIFICANT CHANGE UP (ref 3.5–5.3)
PROTHROM AB SERPL-ACNC: 20.9 SEC — HIGH (ref 10–12.9)
RBC # BLD: 3.08 M/UL — LOW (ref 3.8–5.2)
RBC # BLD: 3.15 M/UL — LOW (ref 3.8–5.2)
RBC # FLD: 19.5 % — HIGH (ref 10.3–14.5)
RBC # FLD: 19.6 % — HIGH (ref 10.3–14.5)
SODIUM SERPL-SCNC: 137 MMOL/L — SIGNIFICANT CHANGE UP (ref 135–145)
TROPONIN I SERPL-MCNC: <0.015 NG/ML — SIGNIFICANT CHANGE UP (ref 0.01–0.04)
TROPONIN I SERPL-MCNC: <0.015 NG/ML — SIGNIFICANT CHANGE UP (ref 0.01–0.04)
TYPE + AB SCN PNL BLD: SIGNIFICANT CHANGE UP
WBC # BLD: 7.28 K/UL — SIGNIFICANT CHANGE UP (ref 3.8–10.5)
WBC # BLD: 7.74 K/UL — SIGNIFICANT CHANGE UP (ref 3.8–10.5)
WBC # FLD AUTO: 7.28 K/UL — SIGNIFICANT CHANGE UP (ref 3.8–10.5)
WBC # FLD AUTO: 7.74 K/UL — SIGNIFICANT CHANGE UP (ref 3.8–10.5)

## 2018-11-01 PROCEDURE — 93010 ELECTROCARDIOGRAM REPORT: CPT

## 2018-11-01 PROCEDURE — 99291 CRITICAL CARE FIRST HOUR: CPT

## 2018-11-01 PROCEDURE — 93010 ELECTROCARDIOGRAM REPORT: CPT | Mod: 76

## 2018-11-01 PROCEDURE — 71046 X-RAY EXAM CHEST 2 VIEWS: CPT | Mod: 26

## 2018-11-01 RX ORDER — SODIUM CHLORIDE 9 MG/ML
500 INJECTION INTRAMUSCULAR; INTRAVENOUS; SUBCUTANEOUS ONCE
Qty: 0 | Refills: 0 | Status: COMPLETED | OUTPATIENT
Start: 2018-11-01 | End: 2018-11-01

## 2018-11-01 RX ORDER — METOPROLOL TARTRATE 50 MG
1 TABLET ORAL
Qty: 0 | Refills: 0 | COMMUNITY

## 2018-11-01 RX ORDER — AMLODIPINE BESYLATE 2.5 MG/1
5 TABLET ORAL DAILY
Qty: 0 | Refills: 0 | Status: DISCONTINUED | OUTPATIENT
Start: 2018-11-01 | End: 2018-11-03

## 2018-11-01 RX ORDER — FLUTICASONE PROPIONATE AND SALMETEROL 50; 250 UG/1; UG/1
1 POWDER ORAL; RESPIRATORY (INHALATION)
Qty: 0 | Refills: 0 | COMMUNITY

## 2018-11-01 RX ORDER — SOTALOL HCL 120 MG
80 TABLET ORAL
Qty: 0 | Refills: 0 | Status: DISCONTINUED | OUTPATIENT
Start: 2018-11-01 | End: 2018-11-03

## 2018-11-01 RX ORDER — PANTOPRAZOLE SODIUM 20 MG/1
8 TABLET, DELAYED RELEASE ORAL
Qty: 80 | Refills: 0 | Status: DISCONTINUED | OUTPATIENT
Start: 2018-11-01 | End: 2018-11-02

## 2018-11-01 RX ORDER — ATORVASTATIN CALCIUM 80 MG/1
80 TABLET, FILM COATED ORAL AT BEDTIME
Qty: 0 | Refills: 0 | Status: DISCONTINUED | OUTPATIENT
Start: 2018-11-01 | End: 2018-11-03

## 2018-11-01 RX ADMIN — SODIUM CHLORIDE 1000 MILLILITER(S): 9 INJECTION INTRAMUSCULAR; INTRAVENOUS; SUBCUTANEOUS at 13:23

## 2018-11-01 RX ADMIN — SODIUM CHLORIDE 500 MILLILITER(S): 9 INJECTION INTRAMUSCULAR; INTRAVENOUS; SUBCUTANEOUS at 19:59

## 2018-11-01 RX ADMIN — PANTOPRAZOLE SODIUM 10 MG/HR: 20 TABLET, DELAYED RELEASE ORAL at 20:32

## 2018-11-01 RX ADMIN — ATORVASTATIN CALCIUM 80 MILLIGRAM(S): 80 TABLET, FILM COATED ORAL at 22:11

## 2018-11-01 NOTE — H&P ADULT - HISTORY OF PRESENT ILLNESS
77 yo female with h/o GI bleed from PUD, paroxysmal A. fib (on eliquis), CAD s/p stent (on ASA), HTN, HLD, anemia, h/o lung CA sent to ED for anemia. Pt has recent visit to hematologist and noted to be anemic and was transfused one unit PRBC. She was advised to follow up with GI who scheduled her for an EGD on 11/8. She went to pre-op testing today and was found to be anemic to 7. She denies any active bleeding. Does have dark stools but states she in on iron pills. No gross blood. No abd pain. She does states she feels SOB and lightheaded. No chest pain.

## 2018-11-01 NOTE — ED ADULT NURSE NOTE - OBJECTIVE STATEMENT
pt was senf for anemia denies blood in stool .pt came to meet Dr Mcgraw . PT denies dizziness or chest pain.pt feels tiered

## 2018-11-01 NOTE — ED STATDOCS - ATTENDING CONTRIBUTION TO CARE
I, Tania Rubio MD,  performed the initial face to face bedside interview with this patient regarding history of present illness, review of symptoms and relevant past medical, social and family history.  I completed an independent physical examination.  I was the initial provider who evaluated this patient. I have signed out the follow up of any pending tests (i.e. labs, radiological studies) to the ACP.  I have communicated the patient’s plan of care and disposition with the ACP.  The history, relevant review of systems, past medical and surgical history, medical decision making, and physical examination was documented by the scribe in my presence and I attest to the accuracy of the documentation.

## 2018-11-01 NOTE — ED STATDOCS - PROGRESS NOTE DETAILS
77 y/o F with PMHx of CAD s/p stents, HTN, and A-fib, on Eliquis, gastric ulcers presenting to the ED sent by GI Dr. Ramirez for low hemoglobin levels 7.7 on pre-op labs performed this morning prior to planned endoscopy.  Pt reports mild SOB and fatigue, denies dizziness, syncope, lightheadedness, fever, chills, N/V/D, numbness, tingling, swelling.  Pt states she has been on eliquis x1 year and has a loop recorder in place, follows with Dr. Hunter at [facility].  PE: lungs CTA b/l, heart RRR s1/s2, abd soft, non-tender, +pale conjunctiva, +pale skin  Plan: repeat labs, T&S, will call Dr. Ramirez with results, EKG, CXR, possible PRBC transfusion  Seda Rios PA-C EKG with TWI in V4-V6, prior EKG from 10/19/18 w/o TWI, troponin added to lab work  Seda Rios PA-C repeat hbg 7.7, PRBC ordered, pt seen by Dr. Ramirez who wants pt admitted, protonix gtt, endoscopy tmrw 1:30pm, NPO after midnight, troponin 1 negative.  Plan to admit pt for endoscopy tmrw, spoke to hospitalist Dr. Esparza who accepted pt.  Pt agreeable to being admitted and plan of care  Seda Rios PA-C

## 2018-11-01 NOTE — ED STATDOCS - OBJECTIVE STATEMENT
79 y/o F with PMHx of MI, CAD, HTN, and A-fib, on Eliquis presenting to the ED for evaluation of low hemoglobin level. Pt had pre-op labs performed yesterday for her endoscopy which is scheduled for 11/08. GI Dr. Barrientos called her today and informed her that her hemoglobin level was 7.7 and advised her to come into ED for evaluation. +SOB. +generalized fatigue. Denies any lightheadedness, dizziness, CP, abd pain, N/V/D, fevers, chills, numbness, or leg swelling. Pt reports that she had a lung biopsy performed one year ago and went into A-fib post-op. Pt placed on Eliquis and had Holter monitor placed after this event, followed by Cardiologist Dr. Hunter at [place] for this issue. Allergic to Prednisone. Former smoker.

## 2018-11-01 NOTE — CONSULT NOTE ADULT - ASSESSMENT
Anemia  hx gastric ulcer       advised upper endoscopy with possible biopsies possible bicap cautery with iv sedation  rationale, all potential risks including but not limited to perforation requiring surgery, aspiration pneumonia or bleeding requiring blood transfusions were discussed along with all benefits and alternatives discussed and the pt wishes to have an upper endoscopy with iv sedation  transfuse to keep hgb above 9  protonix drip  npo except po meds after mn for egd tomorrow.   cardiology consult   hold eliquis for now

## 2018-11-01 NOTE — ED ADULT TRIAGE NOTE - CHIEF COMPLAINT QUOTE
Patient presents with abnormal blood test result sent in from Dr Barrientos. Patient states hemoglobin was 7.7, patient denies lightheaded or dizziness. Reports recent increase in shortness of breath

## 2018-11-01 NOTE — ED ADULT NURSE REASSESSMENT NOTE - NS ED NURSE REASSESS COMMENT FT1
patient requesting to eat, patient spoke with GI doctor. er MD aware. will continue to monitor for safety and comfort. no other needs at this time.

## 2018-11-01 NOTE — H&P ADULT - PMH
Anemia    Atrial fibrillation    CAD (coronary artery disease)    HTN (hypertension)    Lung cancer    MI (myocardial infarction)    Peptic ulcer disease

## 2018-11-01 NOTE — ED STATDOCS - CONDUCTED A DETAILED DISCUSSION WITH PATIENT AND/OR GUARDIAN REGARDING, MDM
need to admit/radiology results/lab results/return to ED if symptoms worsen, persist or questions arise

## 2018-11-01 NOTE — H&P ADULT - NSHPPHYSICALEXAM_GEN_ALL_CORE
Vital Signs Last 24 Hrs  T(C): 36.8 (01 Nov 2018 17:06), Max: 36.8 (01 Nov 2018 17:06)  T(F): 98.2 (01 Nov 2018 17:06), Max: 98.2 (01 Nov 2018 17:06)  HR: 72 (01 Nov 2018 15:09) (67 - 72)  BP: 155/92 (01 Nov 2018 17:06) (119/50 - 155/92)  BP(mean): --  RR: 16 (01 Nov 2018 17:06) (16 - 18)  SpO2: 96% (01 Nov 2018 17:06) (96% - 98%)

## 2018-11-01 NOTE — H&P ADULT - ASSESSMENT
79 yo female with h/o GI bleed from PUD, paroxysmal A. fib (on eliquis), CAD s/p stent (on ASA), HTN, HLD, anemia, h/o lung CA admitted with anemia.    #Anemia possibly secondary to upper GI bleed given history of PUD  - admit to tele  - awaiting PRBC transfusion - 1 unit  - serial H/H  - GI consult appreciated - plan for EGD in AM  - NPO at midnight  - hold asa and eliquis    #Abnormal EKG with new TWI in lateral leads  - cardio consult  - asa held secondary to possible bleed    #PAF  - hold eliquis  - continue sotalol    #HTN/HLD  - continue home meds    #DVT prophylaxis  - SCDs    IMPROVE VTE Individual Risk Assessment    RISK                                                                Points    [  ] Previous VTE                                                  3    [  ] Thrombophilia                                               2    [  ] Lower limb paralysis                                      2        (unable to hold up >15 seconds)      [  ] Current Cancer                                              2         (within 6 months)    [  ] Immobilization > 24 hrs                                1    [  ] ICU/CCU stay > 24 hours                              1    [  x] Age > 60                                                      1    IMPROVE VTE Score __1_______

## 2018-11-01 NOTE — ED STATDOCS - EYES, MLM
clear bilaterally.  Pupils equal, round, and reactive to light. Pale conjunctiva. Pupils equal, round, and reactive to light.

## 2018-11-01 NOTE — ED STATDOCS - SKIN, MLM
skin normal color for race, warm, dry and intact. Pt appears pale. Skin intact, no evidence of rash.

## 2018-11-01 NOTE — ED STATDOCS - CARE PLAN
Principal Discharge DX:	Anemia, unspecified type  Secondary Diagnosis:	Gastric ulcer, unspecified chronicity, unspecified whether gastric ulcer hemorrhage or perforation present

## 2018-11-02 DIAGNOSIS — D64.9 ANEMIA, UNSPECIFIED: ICD-10-CM

## 2018-11-02 DIAGNOSIS — I48.0 PAROXYSMAL ATRIAL FIBRILLATION: ICD-10-CM

## 2018-11-02 DIAGNOSIS — I25.10 ATHEROSCLEROTIC HEART DISEASE OF NATIVE CORONARY ARTERY WITHOUT ANGINA PECTORIS: ICD-10-CM

## 2018-11-02 LAB
ANION GAP SERPL CALC-SCNC: 9 MMOL/L — SIGNIFICANT CHANGE UP (ref 5–17)
ANION GAP SERPL CALC-SCNC: 9 MMOL/L — SIGNIFICANT CHANGE UP (ref 5–17)
BASOPHILS # BLD AUTO: 0.08 K/UL — SIGNIFICANT CHANGE UP (ref 0–0.2)
BASOPHILS NFR BLD AUTO: 1 % — SIGNIFICANT CHANGE UP (ref 0–2)
BUN SERPL-MCNC: 10 MG/DL — SIGNIFICANT CHANGE UP (ref 7–23)
BUN SERPL-MCNC: 10 MG/DL — SIGNIFICANT CHANGE UP (ref 7–23)
CALCIUM SERPL-MCNC: 8.5 MG/DL — SIGNIFICANT CHANGE UP (ref 8.5–10.1)
CALCIUM SERPL-MCNC: 8.7 MG/DL — SIGNIFICANT CHANGE UP (ref 8.5–10.1)
CHLORIDE SERPL-SCNC: 102 MMOL/L — SIGNIFICANT CHANGE UP (ref 96–108)
CHLORIDE SERPL-SCNC: 102 MMOL/L — SIGNIFICANT CHANGE UP (ref 96–108)
CO2 SERPL-SCNC: 28 MMOL/L — SIGNIFICANT CHANGE UP (ref 22–31)
CO2 SERPL-SCNC: 29 MMOL/L — SIGNIFICANT CHANGE UP (ref 22–31)
CREAT SERPL-MCNC: 0.54 MG/DL — SIGNIFICANT CHANGE UP (ref 0.5–1.3)
CREAT SERPL-MCNC: 0.54 MG/DL — SIGNIFICANT CHANGE UP (ref 0.5–1.3)
EOSINOPHIL # BLD AUTO: 0.23 K/UL — SIGNIFICANT CHANGE UP (ref 0–0.5)
EOSINOPHIL NFR BLD AUTO: 2.7 % — SIGNIFICANT CHANGE UP (ref 0–6)
GLUCOSE SERPL-MCNC: 78 MG/DL — SIGNIFICANT CHANGE UP (ref 70–99)
GLUCOSE SERPL-MCNC: 94 MG/DL — SIGNIFICANT CHANGE UP (ref 70–99)
HCT VFR BLD CALC: 30.1 % — LOW (ref 34.5–45)
HGB BLD-MCNC: 9 G/DL — LOW (ref 11.5–15.5)
IMM GRANULOCYTES NFR BLD AUTO: 0.4 % — SIGNIFICANT CHANGE UP (ref 0–1.5)
LYMPHOCYTES # BLD AUTO: 1.39 K/UL — SIGNIFICANT CHANGE UP (ref 1–3.3)
LYMPHOCYTES # BLD AUTO: 16.6 % — SIGNIFICANT CHANGE UP (ref 13–44)
MAGNESIUM SERPL-MCNC: 1.9 MG/DL — SIGNIFICANT CHANGE UP (ref 1.6–2.6)
MCHC RBC-ENTMCNC: 24.9 PG — LOW (ref 27–34)
MCHC RBC-ENTMCNC: 29.9 GM/DL — LOW (ref 32–36)
MCV RBC AUTO: 83.1 FL — SIGNIFICANT CHANGE UP (ref 80–100)
MONOCYTES # BLD AUTO: 1.34 K/UL — HIGH (ref 0–0.9)
MONOCYTES NFR BLD AUTO: 16 % — HIGH (ref 2–14)
NEUTROPHILS # BLD AUTO: 5.32 K/UL — SIGNIFICANT CHANGE UP (ref 1.8–7.4)
NEUTROPHILS NFR BLD AUTO: 63.3 % — SIGNIFICANT CHANGE UP (ref 43–77)
NRBC # BLD: 0 /100 WBCS — SIGNIFICANT CHANGE UP (ref 0–0)
PLATELET # BLD AUTO: 361 K/UL — SIGNIFICANT CHANGE UP (ref 150–400)
POTASSIUM SERPL-MCNC: 2.8 MMOL/L — CRITICAL LOW (ref 3.5–5.3)
POTASSIUM SERPL-MCNC: 3 MMOL/L — LOW (ref 3.5–5.3)
POTASSIUM SERPL-SCNC: 2.8 MMOL/L — CRITICAL LOW (ref 3.5–5.3)
POTASSIUM SERPL-SCNC: 3 MMOL/L — LOW (ref 3.5–5.3)
RBC # BLD: 3.62 M/UL — LOW (ref 3.8–5.2)
RBC # FLD: 18.3 % — HIGH (ref 10.3–14.5)
SODIUM SERPL-SCNC: 139 MMOL/L — SIGNIFICANT CHANGE UP (ref 135–145)
SODIUM SERPL-SCNC: 140 MMOL/L — SIGNIFICANT CHANGE UP (ref 135–145)
TROPONIN I SERPL-MCNC: <0.015 NG/ML — SIGNIFICANT CHANGE UP (ref 0.01–0.04)
WBC # BLD: 8.39 K/UL — SIGNIFICANT CHANGE UP (ref 3.8–10.5)
WBC # FLD AUTO: 8.39 K/UL — SIGNIFICANT CHANGE UP (ref 3.8–10.5)

## 2018-11-02 PROCEDURE — 93010 ELECTROCARDIOGRAM REPORT: CPT

## 2018-11-02 PROCEDURE — 99223 1ST HOSP IP/OBS HIGH 75: CPT

## 2018-11-02 RX ORDER — ASPIRIN/CALCIUM CARB/MAGNESIUM 324 MG
81 TABLET ORAL DAILY
Qty: 0 | Refills: 0 | Status: DISCONTINUED | OUTPATIENT
Start: 2018-11-02 | End: 2018-11-03

## 2018-11-02 RX ORDER — PANTOPRAZOLE SODIUM 20 MG/1
40 TABLET, DELAYED RELEASE ORAL
Qty: 0 | Refills: 0 | Status: DISCONTINUED | OUTPATIENT
Start: 2018-11-02 | End: 2018-11-03

## 2018-11-02 RX ORDER — POTASSIUM CHLORIDE 20 MEQ
40 PACKET (EA) ORAL EVERY 4 HOURS
Qty: 0 | Refills: 0 | Status: COMPLETED | OUTPATIENT
Start: 2018-11-02 | End: 2018-11-02

## 2018-11-02 RX ORDER — SODIUM CHLORIDE 9 MG/ML
1000 INJECTION, SOLUTION INTRAVENOUS
Qty: 0 | Refills: 0 | Status: DISCONTINUED | OUTPATIENT
Start: 2018-11-02 | End: 2018-11-02

## 2018-11-02 RX ADMIN — PANTOPRAZOLE SODIUM 10 MG/HR: 20 TABLET, DELAYED RELEASE ORAL at 15:34

## 2018-11-02 RX ADMIN — AMLODIPINE BESYLATE 5 MILLIGRAM(S): 2.5 TABLET ORAL at 05:04

## 2018-11-02 RX ADMIN — ATORVASTATIN CALCIUM 80 MILLIGRAM(S): 80 TABLET, FILM COATED ORAL at 21:17

## 2018-11-02 RX ADMIN — Medication 80 MILLIGRAM(S): at 18:37

## 2018-11-02 RX ADMIN — Medication 40 MILLIEQUIVALENT(S): at 11:17

## 2018-11-02 RX ADMIN — Medication 81 MILLIGRAM(S): at 18:37

## 2018-11-02 RX ADMIN — SODIUM CHLORIDE 75 MILLILITER(S): 9 INJECTION, SOLUTION INTRAVENOUS at 14:49

## 2018-11-02 RX ADMIN — Medication 80 MILLIGRAM(S): at 05:04

## 2018-11-02 NOTE — CHART NOTE - NSCHARTNOTEFT_GEN_A_CORE
Notified of critical lab value K+ 2.9. K+ 3.7 on admission less than 24 hours ago. Lab error suspected. Patient NPO currently for EGD to evaluate GI bleed.     Plan:   Stat repeat BMP  Will hold K+ repletion until BMP results

## 2018-11-02 NOTE — CONSULT NOTE ADULT - SUBJECTIVE AND OBJECTIVE BOX
PCP:    REQUESTING PHYSICIAN:    REASON FOR CONSULT:    CHIEF COMPLAINT:    HPI:  79 yo female with h/o GI bleed from PUD, paroxysmal A. fib (on eliquis), CAD s/p stent (on ASA), HTN, HLD, anemia, h/o lung CA sent to ED for anemia. Pt has recent visit to hematologist and noted to be anemic and was transfused one unit PRBC. She was advised to follow up with GI who scheduled her for an EGD on 11/8. She went to pre-op testing today and was found to be anemic to 7. She denies any active bleeding. Does have dark stools but states she in on iron pills. No gross blood. No abd pain. She does states she feels SOB and lightheaded. No chest pain. (01 Nov 2018 18:13).   Cardiology evaluation requested to assess cardiac status in light of anemia and symptoms. Pt denies chest pain. Her last Echo in 2017 revealed normal LV function. She was last seen in our office appx 4 months. ago and there were no acute issues at that time.       PAST MEDICAL & SURGICAL HISTORY:  Anemia  Lung cancer  Peptic ulcer disease  Atrial fibrillation  MI (myocardial infarction)  CAD (coronary artery disease)  HTN (hypertension)  No significant past surgical history      Allergies    predniSONE (Other (Moderate))    Intolerances        SOCIAL HISTORY:    FAMILY HISTORY:  Family history of intracranial hemorrhage (Father)      MEDICATIONS:  MEDICATIONS  (STANDING):  amLODIPine   Tablet 5 milliGRAM(s) Oral daily  atorvastatin 80 milliGRAM(s) Oral at bedtime  multivitamin 1 Tablet(s) Oral daily  pantoprazole Infusion 8 mG/Hr (10 mL/Hr) IV Continuous <Continuous>  potassium chloride    Tablet ER 40 milliEquivalent(s) Oral every 4 hours  sotalol 80 milliGRAM(s) Oral two times a day    MEDICATIONS  (PRN):      REVIEW OF SYSTEMS:    CONSTITUTIONAL: No weakness, fevers or chills  EYES/ENT: No visual changes;  No vertigo or throat pain   NECK: No pain or stiffness  RESPIRATORY: Shortness of breath  CARDIOVASCULAR: No chest pain or palpitations  GASTROINTESTINAL: No abdominal or epigastric pain. No nausea, vomiting, or hematemesis; No diarrhea or constipation. No melena or hematochezia.  GENITOURINARY: No dysuria, frequency or hematuria  NEUROLOGICAL: No numbness or weakness  SKIN: No itching, burning, rashes, or lesions   All other review of systems is negative unless indicated above    Vital Signs Last 24 Hrs  T(C): 36.6 (02 Nov 2018 05:01), Max: 36.9 (01 Nov 2018 20:16)  T(F): 97.9 (02 Nov 2018 05:01), Max: 98.4 (01 Nov 2018 20:16)  HR: 81 (02 Nov 2018 05:01) (67 - 88)  BP: 161/78 (02 Nov 2018 05:01) (119/50 - 165/95)  BP(mean): --  RR: 18 (01 Nov 2018 22:49) (16 - 18)  SpO2: 94% (02 Nov 2018 05:01) (94% - 100%)    I&O's Summary    01 Nov 2018 07:01  -  02 Nov 2018 07:00  --------------------------------------------------------  IN: 273 mL / OUT: 0 mL / NET: 273 mL        PHYSICAL EXAM:    Constitutional: NAD, awake and alert, well-developed  HEENT: PERR, EOMI,  No oral cyananosis.  Neck:  supple,  No JVD  Respiratory: Breath sounds are clear bilaterally, No wheezing, rales or rhonchi  Cardiovascular: S1 and S2, regular rate and rhythm, no Murmurs, gallops or rubs  Gastrointestinal: Bowel Sounds present, soft, nontender.   Extremities: No peripheral edema. No clubbing or cyanosis.  Vascular: 2+ peripheral pulses  Neurological: A/O x 3, no focal deficits  Musculoskeletal: no calf tenderness.  Skin: No rashes.      LABS: All Labs Reviewed:                        9.0    8.39  )-----------( 361      ( 02 Nov 2018 04:31 )             30.1                         7.5    7.74  )-----------( 380      ( 01 Nov 2018 13:24 )             25.8                         7.7    7.28  )-----------( 380      ( 01 Nov 2018 09:24 )             26.2     02 Nov 2018 07:21    140    |  102    |  10     ----------------------------<  94     3.0     |  29     |  0.54   02 Nov 2018 04:31    139    |  102    |  10     ----------------------------<  78     2.8     |  28     |  0.54   01 Nov 2018 09:24    137    |  102    |  12     ----------------------------<  110    3.7     |  30     |  0.80     Ca    8.5        02 Nov 2018 07:21  Ca    8.7        02 Nov 2018 04:31  Ca    8.9        01 Nov 2018 09:24  Mg     1.9       02 Nov 2018 04:31      PT/INR - ( 01 Nov 2018 13:24 )   PT: 20.9 sec;   INR: 1.85 ratio         PTT - ( 01 Nov 2018 13:24 )  PTT:30.7 sec  CARDIAC MARKERS ( 02 Nov 2018 00:01 )  <0.015 ng/mL / x     / x     / x     / x      CARDIAC MARKERS ( 01 Nov 2018 18:19 )  <0.015 ng/mL / x     / x     / x     / x      CARDIAC MARKERS ( 01 Nov 2018 13:24 )  <0.015 ng/mL / x     / x     / x     / x          Blood Culture:         RADIOLOGY/EKG:< from: 12 Lead ECG (11.01.18 @ 12:54) >  Diagnosis Line Normal sinus rhythmwith sinus arrhythmia  Minimal voltage criteria for LVH, may be normal variant  Cannot rule out Inferior infarct (cited on or before 07-JUL-2017)  Cannot rule out Anterior infarct , age undetermined  ST & T wave abnormality, consider lateral ischemia  Abnormal ECG  When compared with ECG of 01-NOV-2018 09:29,  Premature atrial complexes are no longer Present  Confirmed by Hiram Luna MD ([id number]) on 11/2/2018 10:13:00 AM    < end of copied text >    < from: Transthoracic Echocardiogram (07.08.17 @ 09:43) >  Summary     Left ventricular wall motion is normal.   Estimated left ventricular ejection fraction is 60 %.   Normal appearing left atrium.   Normal appearing right atrium.   Normal appearing right ventricle structure and function.   Mild fibrocalcific changes noted to the aortic valve leaflets with   preserved excursion.   Mild (1+) mitral regurgitation is present.   Mild mitral annular calcification is present.   Fibrocalcific changes noted to the mitral valve leaflets with preserved   leaflet excursion.   EA reversal of the mitral inflow consistent with reduced compliance of   the   left ventricle   Trace tricuspid valve regurgitation is present.   No evidence of pericardial effusion.     Signature     ----------------------------------------------------------------   Electronically signed by Palomo Gregg MD(Interpreting   physician) on 07/08/2017 05:24 PM   ----------------------------------------------------------------    < end of copied text >
HPI:  78 yr old woman with hx gastric ulcer  presents with fatigue and anemia  pt says stools brown  no melena  no brbpr  no abd pain  on eliquis  no heartburn or dysphagia  bm's regular      PAST MEDICAL & SURGICAL HISTORY:  MI (myocardial infarction)  CAD (coronary artery disease)  HTN (hypertension)  No significant past surgical history      Allergies    predniSONE (Other (Moderate))    Intolerances        MEDICATIONS  (STANDING):    MEDICATIONS  (PRN):      FAMILY HISTORY:  No pertinent family history in first degree relatives      Social History: remote  tobacco and no etoh    REVIEW OF SYSTEMS      General:	No fever or chills    Skin/Breast: No jaundice or rash   		  ENMT: denies sore throat or thrush    Respiratory and Thorax: Denies dyspnea or cough or shortness of breath  	  Cardiovascular: Denies chest pain or palpitations 	    Gastrointestinal: Denies jaundice or pruritis    Genitourinary: Denies dysuria or hematuria	    Musculoskeletal: Denies muscular pain or swelling	    Neurological: Denies confusion or tremor	    Hematology/Lymphatics: Denies easy bruising or bleeding 	    Endocrine:	Denies polyphagia or polyuria    See above hx otherwise neg for any major organ systems    PHYSICAL EXAM:    Vital Signs Last 24 Hrs  T(C): 36.7 (01 Nov 2018 12:38), Max: 36.7 (01 Nov 2018 12:38)  T(F): 98.1 (01 Nov 2018 12:38), Max: 98.1 (01 Nov 2018 12:38)  HR: 67 (01 Nov 2018 12:38) (67 - 67)  BP: 136/65 (01 Nov 2018 12:38) (136/65 - 136/65)  BP(mean): --  RR: 17 (01 Nov 2018 12:38) (17 - 17)  SpO2: 96% (01 Nov 2018 12:38) (96% - 96%)    Constitutional: no acute distress    ENMT: NC/AT scl anicteric opm pink no lesions     Neck: supple. No jvd or LN    Respiratory: Clear     Cardiovascular: RRR s1s2     Gastrointestinal: Pos bs , soft , not tender, no hepatosplenomegaly,  no mass      Back: No CVA tenderness    Extremities: NO cce     Neurological: Alert and oriented x 3     Skin: No rash or jaundice     Date/Time:11-01 @ 13:24    Albumin: --  ALT/SGPT: --  Alk Phos: --  AST/SGOT: --  Bilirubin Direct: --  Bilirubin Total: --  Ca: --  eGFR : --  eGFR Non-African American: --  Lipase: --  Amylase: --  INR: 1.85  PTT: --  Date/Time:11-01 @ 09:24    Albumin: --  ALT/SGPT: --  Alk Phos: --  AST/SGOT: --  Bilirubin Direct: --  Bilirubin Total: --  Ca: 8.9  eGFR : 81  eGFR Non-: 70  Lipase: --  Amylase: --  INR: --  PTT: --      11-01    137  |  102  |  12  ----------------------------<  110<H>  3.7   |  30  |  0.80    Ca    8.9      01 Nov 2018 09:24                              7.7    7.28  )-----------( 380      ( 01 Nov 2018 09:24 )             26.2

## 2018-11-02 NOTE — PROGRESS NOTE ADULT - SUBJECTIVE AND OBJECTIVE BOX
Reason for Admission: anemia	  History of Present Illness: 	  79 yo female with h/o GI bleed from PUD, paroxysmal A. fib (on eliquis), CAD s/p stent (on ASA), HTN, HLD, anemia, h/o lung CA sent to ED for anemia. Pt has recent visit to hematologist and noted to be anemic and was transfused one unit PRBC. She was advised to follow up with GI who scheduled her for an EGD on 11/8. She went to pre-op testing today and was found to be anemic to 7. She denies any active bleeding. Does have dark stools but states she in on iron pills. No gross blood. No abd pain. She does states she feels SOB and lightheaded. No chest pain.     11/2: No complaints, HD stable. asymptomatic.  awaiting EGD.  s/p 1u prbc with appropriate response.    REVIEW OF SYSTEMS: All other review of systems is negative unless indicated above.      Vital Signs Last 24 Hrs  T(C): 36.3 (02 Nov 2018 10:45), Max: 36.9 (01 Nov 2018 20:16)  T(F): 97.4 (02 Nov 2018 10:45), Max: 98.4 (01 Nov 2018 20:16)  HR: 73 (02 Nov 2018 10:45) (73 - 88)  BP: 142/75 (02 Nov 2018 10:45) (142/75 - 165/95)  BP(mean): --  RR: 17 (02 Nov 2018 10:45) (16 - 18)  SpO2: 97% (02 Nov 2018 10:45) (94% - 100%)    PHYSICAL EXAM:    Constitutional: NAD, awake and alert, well-developed  HEENT: PERR, EOMI, Normal Hearing, MMM  Neck: Soft and supple  Respiratory: Breath sounds are clear bilaterally, No wheezing, rales or rhonchi  Cardiovascular: S1 and S2, regular rate and rhythm, no Murmurs, gallops or rubs  Gastrointestinal: Bowel Sounds present, soft, nontender, nondistended, no guarding, no rebound  Extremities: No peripheral edema  Neurological: A/O x 3, no focal deficits in my limited exam  Skin: No rashes        MEDICATIONS  (STANDING):  amLODIPine   Tablet 5 milliGRAM(s) Oral daily  atorvastatin 80 milliGRAM(s) Oral at bedtime  lactated ringers. 1000 milliLiter(s) (75 mL/Hr) IV Continuous <Continuous>  multivitamin 1 Tablet(s) Oral daily  pantoprazole Infusion 8 mG/Hr (10 mL/Hr) IV Continuous <Continuous>  sotalol 80 milliGRAM(s) Oral two times a day    MEDICATIONS  (PRN):    CARDIAC MARKERS ( 02 Nov 2018 00:01 )  <0.015 ng/mL / x     / x     / x     / x      CARDIAC MARKERS ( 01 Nov 2018 18:19 )  <0.015 ng/mL / x     / x     / x     / x      CARDIAC MARKERS ( 01 Nov 2018 13:24 )  <0.015 ng/mL / x     / x     / x     / x                                9.0    8.39  )-----------( 361      ( 02 Nov 2018 04:31 )             30.1     11-02    140  |  102  |  10  ----------------------------<  94  3.0<L>   |  29  |  0.54    Ca    8.5      02 Nov 2018 07:21  Mg     1.9     11-02      CAPILLARY BLOOD GLUCOSE          PT/INR - ( 01 Nov 2018 13:24 )   PT: 20.9 sec;   INR: 1.85 ratio         PTT - ( 01 Nov 2018 13:24 )  PTT:30.7 sec            Assessment and Plan:  79 yo female with h/o GI bleed from PUD, paroxysmal A. fib (on eliquis), CAD s/p stent (on ASA), HTN, HLD, anemia, h/o lung CA admitted with anemia.    Acute on chronic Anemia possibly secondary to upper GI bleed given history of PUD  - admit to tele  - s/p 1u prbc with appropriate response  - monitor H+H  - GI consult appreciated - plan for EGD  - hold asa and eliquis for now     Abnormal EKG with new TWI in lateral leads  - cardio consult appreciated  - asa held secondary to possible bleed    PAF  - hold eliquis  - continue sotalol

## 2018-11-03 ENCOUNTER — TRANSCRIPTION ENCOUNTER (OUTPATIENT)
Age: 78
End: 2018-11-03

## 2018-11-03 VITALS
SYSTOLIC BLOOD PRESSURE: 105 MMHG | HEART RATE: 56 BPM | RESPIRATION RATE: 18 BRPM | TEMPERATURE: 98 F | DIASTOLIC BLOOD PRESSURE: 45 MMHG | OXYGEN SATURATION: 98 %

## 2018-11-03 LAB
ANION GAP SERPL CALC-SCNC: 6 MMOL/L — SIGNIFICANT CHANGE UP (ref 5–17)
BUN SERPL-MCNC: 14 MG/DL — SIGNIFICANT CHANGE UP (ref 7–23)
CALCIUM SERPL-MCNC: 8.4 MG/DL — LOW (ref 8.5–10.1)
CHLORIDE SERPL-SCNC: 104 MMOL/L — SIGNIFICANT CHANGE UP (ref 96–108)
CO2 SERPL-SCNC: 28 MMOL/L — SIGNIFICANT CHANGE UP (ref 22–31)
CREAT SERPL-MCNC: 0.56 MG/DL — SIGNIFICANT CHANGE UP (ref 0.5–1.3)
GLUCOSE SERPL-MCNC: 83 MG/DL — SIGNIFICANT CHANGE UP (ref 70–99)
HCT VFR BLD CALC: 33 % — LOW (ref 34.5–45)
HGB BLD-MCNC: 10.2 G/DL — LOW (ref 11.5–15.5)
MCHC RBC-ENTMCNC: 26.1 PG — LOW (ref 27–34)
MCHC RBC-ENTMCNC: 30.9 GM/DL — LOW (ref 32–36)
MCV RBC AUTO: 84.4 FL — SIGNIFICANT CHANGE UP (ref 80–100)
NRBC # BLD: 0 /100 WBCS — SIGNIFICANT CHANGE UP (ref 0–0)
PLATELET # BLD AUTO: 325 K/UL — SIGNIFICANT CHANGE UP (ref 150–400)
POTASSIUM SERPL-MCNC: 3.3 MMOL/L — LOW (ref 3.5–5.3)
POTASSIUM SERPL-SCNC: 3.3 MMOL/L — LOW (ref 3.5–5.3)
RBC # BLD: 3.91 M/UL — SIGNIFICANT CHANGE UP (ref 3.8–5.2)
RBC # FLD: 18.3 % — HIGH (ref 10.3–14.5)
SODIUM SERPL-SCNC: 138 MMOL/L — SIGNIFICANT CHANGE UP (ref 135–145)
WBC # BLD: 7.55 K/UL — SIGNIFICANT CHANGE UP (ref 3.8–10.5)
WBC # FLD AUTO: 7.55 K/UL — SIGNIFICANT CHANGE UP (ref 3.8–10.5)

## 2018-11-03 RX ORDER — PANTOPRAZOLE SODIUM 20 MG/1
1 TABLET, DELAYED RELEASE ORAL
Qty: 30 | Refills: 0
Start: 2018-11-03 | End: 2018-12-02

## 2018-11-03 RX ORDER — RANITIDINE HYDROCHLORIDE 150 MG/1
1 TABLET, FILM COATED ORAL
Qty: 0 | Refills: 0 | COMMUNITY

## 2018-11-03 RX ADMIN — Medication 1 TABLET(S): at 10:32

## 2018-11-03 RX ADMIN — Medication 81 MILLIGRAM(S): at 10:32

## 2018-11-03 RX ADMIN — Medication 80 MILLIGRAM(S): at 05:57

## 2018-11-03 RX ADMIN — PANTOPRAZOLE SODIUM 40 MILLIGRAM(S): 20 TABLET, DELAYED RELEASE ORAL at 05:57

## 2018-11-03 RX ADMIN — AMLODIPINE BESYLATE 5 MILLIGRAM(S): 2.5 TABLET ORAL at 05:58

## 2018-11-03 NOTE — DISCHARGE NOTE ADULT - CARE PROVIDERS DIRECT ADDRESSES
[email or web address]

## 2018-11-03 NOTE — DISCHARGE NOTE ADULT - MEDICATION SUMMARY - MEDICATIONS TO TAKE
I will START or STAY ON the medications listed below when I get home from the hospital:    aspirin 81 mg oral tablet  -- 1 tab(s) by mouth once a day  -- Indication: For Coronary artery disease involving native coronary artery of native heart without angina pectoris    sotalol 80 mg oral tablet  -- 1 tab(s) by mouth 2 times a day  -- Indication: For Atrial fibrillation    Lipitor 80 mg oral tablet  -- 1 tab(s) by mouth once a day  -- Indication: For Coronary artery disease involving native coronary artery of native heart without angina pectoris    amLODIPine 5 mg oral tablet  -- 1 tab(s) by mouth once a day  -- Indication: For Coronary artery disease involving native coronary artery of native heart without angina pectoris    pantoprazole 40 mg oral delayed release tablet  -- 1 tab(s) by mouth once a day (before a meal)  -- Indication: For Gastric ulcer, unspecified chronicity, unspecified whether gastric ulcer hemorrhage or perforation present    Multiple Vitamins oral tablet  -- 1 tab(s) by mouth once a day  -- Indication: For Anemia

## 2018-11-03 NOTE — DISCHARGE NOTE ADULT - CARE PROVIDER_API CALL
Maik Ramirez), Gastroenterology; Internal Medicine  [address] [place] [address]  Phone: [phone]  Fax: [phone]    Devaughn Obrien), Cardiovascular Disease  [address] [place] [address]  Phone: [phone]  Fax: [phone]

## 2018-11-03 NOTE — DISCHARGE NOTE ADULT - MEDICATION SUMMARY - MEDICATIONS TO CHANGE
I will SWITCH the dose or number of times a day I take the medications listed below when I get home from the hospital:    Eliquis 2.5 mg oral tablet  -- 1 tab(s) by mouth 2 times a day   -- Check with your doctor before becoming pregnant.  It is very important that you take or use this exactly as directed.  Do not skip doses or discontinue unless directed by your doctor.  Obtain medical advice before taking any non-prescription drugs as some may affect the action of this medication.    raNITIdine 150 mg oral tablet  -- 1 tab(s) by mouth 2 times a day

## 2018-11-03 NOTE — DISCHARGE NOTE ADULT - SECONDARY DIAGNOSIS.
Gastric ulcer, unspecified chronicity, unspecified whether gastric ulcer hemorrhage or perforation present Atrial fibrillation, unspecified type Coronary artery disease involving native coronary artery of native heart without angina pectoris Lung cancer HTN (hypertension)

## 2018-11-03 NOTE — DISCHARGE NOTE ADULT - HOSPITAL COURSE
77 yo female with h/o GI bleed from PUD, paroxysmal A. fib (on eliquis), CAD s/p stent (on ASA), HTN, HLD, anemia, h/o lung CA sent to ED for anemia. Pt has recent visit to hematologist and noted to be anemic and was transfused one unit PRBC. She was advised to follow up with GI who scheduled her for an EGD on 11/8. She went to pre-op testing today and was found to be anemic to 7. She denies any active bleeding. Does have dark stools but states she in on iron pills. No gross blood. No abd pain. She does states she feels SOB and lightheaded. No chest pain.     11/2: No complaints, HD stable. asymptomatic.  awaiting EGD.  s/p 1u prbc with appropriate response.    REVIEW OF SYSTEMS: All other review of systems is negative unless indicated above.      Vital Signs Last 24 Hrs  T(C): 36.3 (02 Nov 2018 10:45), Max: 36.9 (01 Nov 2018 20:16)  T(F): 97.4 (02 Nov 2018 10:45), Max: 98.4 (01 Nov 2018 20:16)  HR: 73 (02 Nov 2018 10:45) (73 - 88)  BP: 142/75 (02 Nov 2018 10:45) (142/75 - 165/95)  BP(mean): --  RR: 17 (02 Nov 2018 10:45) (16 - 18)  SpO2: 97% (02 Nov 2018 10:45) (94% - 100%)    PHYSICAL EXAM:    Constitutional: NAD, awake and alert, well-developed  HEENT: PERR, EOMI, Normal Hearing, MMM  Neck: Soft and supple  Respiratory: Breath sounds are clear bilaterally, No wheezing, rales or rhonchi  Cardiovascular: S1 and S2, regular rate and rhythm, no Murmurs, gallops or rubs  Gastrointestinal: Bowel Sounds present, soft, nontender, nondistended, no guarding, no rebound  Extremities: No peripheral edema  Neurological: A/O x 3, no focal deficits in my limited exam  Skin: No rashes    EGD negative

## 2018-11-03 NOTE — DISCHARGE NOTE ADULT - CARE PLAN
Principal Discharge DX:	Anemia due to acute blood loss  Goal:	no bleeding  Assessment and plan of treatment:	f/u with Dr. Ramirez for possible capsule endoscopy  Secondary Diagnosis:	Gastric ulcer, unspecified chronicity, unspecified whether gastric ulcer hemorrhage or perforation present  Secondary Diagnosis:	Atrial fibrillation, unspecified type  Secondary Diagnosis:	Coronary artery disease involving native coronary artery of native heart without angina pectoris  Secondary Diagnosis:	Lung cancer  Secondary Diagnosis:	HTN (hypertension)

## 2018-11-03 NOTE — DISCHARGE NOTE ADULT - PATIENT PORTAL LINK FT
You can access the [email or web address], offered by [facility], by registering with the following website: [email or web address]

## 2018-11-08 DIAGNOSIS — R94.31 ABNORMAL ELECTROCARDIOGRAM [ECG] [EKG]: ICD-10-CM

## 2018-11-08 DIAGNOSIS — D62 ACUTE POSTHEMORRHAGIC ANEMIA: ICD-10-CM

## 2018-11-08 DIAGNOSIS — D64.9 ANEMIA, UNSPECIFIED: ICD-10-CM

## 2018-11-08 DIAGNOSIS — Z85.118 PERSONAL HISTORY OF OTHER MALIGNANT NEOPLASM OF BRONCHUS AND LUNG: ICD-10-CM

## 2018-11-08 DIAGNOSIS — Z79.01 LONG TERM (CURRENT) USE OF ANTICOAGULANTS: ICD-10-CM

## 2018-11-08 DIAGNOSIS — E78.5 HYPERLIPIDEMIA, UNSPECIFIED: ICD-10-CM

## 2018-11-08 DIAGNOSIS — D50.9 IRON DEFICIENCY ANEMIA, UNSPECIFIED: ICD-10-CM

## 2018-11-08 DIAGNOSIS — Z88.8 ALLERGY STATUS TO OTHER DRUGS, MEDICAMENTS AND BIOLOGICAL SUBSTANCES: ICD-10-CM

## 2018-11-08 DIAGNOSIS — I48.0 PAROXYSMAL ATRIAL FIBRILLATION: ICD-10-CM

## 2018-11-08 DIAGNOSIS — K44.9 DIAPHRAGMATIC HERNIA WITHOUT OBSTRUCTION OR GANGRENE: ICD-10-CM

## 2018-11-08 DIAGNOSIS — I25.2 OLD MYOCARDIAL INFARCTION: ICD-10-CM

## 2018-11-08 DIAGNOSIS — I25.10 ATHEROSCLEROTIC HEART DISEASE OF NATIVE CORONARY ARTERY WITHOUT ANGINA PECTORIS: ICD-10-CM

## 2018-11-08 DIAGNOSIS — Z79.82 LONG TERM (CURRENT) USE OF ASPIRIN: ICD-10-CM

## 2018-11-08 DIAGNOSIS — Z87.891 PERSONAL HISTORY OF NICOTINE DEPENDENCE: ICD-10-CM

## 2018-11-08 DIAGNOSIS — Z95.5 PRESENCE OF CORONARY ANGIOPLASTY IMPLANT AND GRAFT: ICD-10-CM

## 2018-11-13 ENCOUNTER — APPOINTMENT (OUTPATIENT)
Dept: CARDIOLOGY | Facility: CLINIC | Age: 78
End: 2018-11-13
Payer: MEDICARE

## 2018-11-13 ENCOUNTER — LABORATORY RESULT (OUTPATIENT)
Age: 78
End: 2018-11-13

## 2018-11-13 ENCOUNTER — NON-APPOINTMENT (OUTPATIENT)
Age: 78
End: 2018-11-13

## 2018-11-13 VITALS
BODY MASS INDEX: 20.8 KG/M2 | SYSTOLIC BLOOD PRESSURE: 171 MMHG | DIASTOLIC BLOOD PRESSURE: 92 MMHG | WEIGHT: 113 LBS | OXYGEN SATURATION: 96 % | HEIGHT: 62 IN | HEART RATE: 76 BPM

## 2018-11-13 PROCEDURE — 99215 OFFICE O/P EST HI 40 MIN: CPT | Mod: 25

## 2018-11-13 PROCEDURE — 93000 ELECTROCARDIOGRAM COMPLETE: CPT

## 2018-11-13 PROCEDURE — 36415 COLL VENOUS BLD VENIPUNCTURE: CPT

## 2018-11-13 RX ORDER — FLUTICASONE PROPIONATE AND SALMETEROL 50; 100 UG/1; UG/1
100-50 POWDER RESPIRATORY (INHALATION) TWICE DAILY
Qty: 3 | Refills: 3 | Status: DISCONTINUED | COMMUNITY
Start: 2018-01-30 | End: 2018-11-13

## 2018-11-13 NOTE — REVIEW OF SYSTEMS
[see HPI] : see HPI [Negative] : Heme/Lymph [Shortness Of Breath] : no shortness of breath [Dyspnea on exertion] : not dyspnea during exertion [Chest  Pressure] : no chest pressure [Chest Pain] : no chest pain [Lower Ext Edema] : no extremity edema [Leg Claudication] : no intermittent leg claudication [Palpitations] : no palpitations

## 2018-11-13 NOTE — DISCUSSION/SUMMARY
[FreeTextEntry1] : Anemia presumed to be GI bleed and off eliquis currently. Small bowel pills study this friday.  Will decide after this about restarting eliquis versus Watchman. Check blood work today.\par HTN: Controlled on current regime. Low sodium diet discussed.\par PAF: Being monitor by EP on sotolol.  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

## 2018-11-13 NOTE — REASON FOR VISIT
[Follow-Up - Clinic] : a clinic follow-up of [Coronary Artery Disease] : coronary artery disease [Hyperlipidemia] : hyperlipidemia [Hypertension] : hypertension [Peripheral Vascular Disease] : peripheral vascular disease [Prior Myocardial Infarction] : a prior myocardial infarction [FreeTextEntry1] : AAA, pre op cardiac  evaluation

## 2018-11-13 NOTE — CARDIOLOGY SUMMARY
[No Ischemia] : no Ischemia [No Exercise Ind Arr] : no exercise induced arrhythmias [No Symptoms] : no Symptoms [LVEF ___%] : LVEF [unfilled]% [None] : no pulmonary hypertension [Normal] : normal LA size [___] : [unfilled]

## 2018-11-13 NOTE — PHYSICAL EXAM
[General Appearance - Well Developed] : well developed [Normal Appearance] : normal appearance [Well Groomed] : well groomed [General Appearance - Well Nourished] : well nourished [No Deformities] : no deformities [General Appearance - In No Acute Distress] : no acute distress [Normal Conjunctiva] : the conjunctiva exhibited no abnormalities [Eyelids - No Xanthelasma] : the eyelids demonstrated no xanthelasmas [Normal Oral Mucosa] : normal oral mucosa [No Oral Pallor] : no oral pallor [No Oral Cyanosis] : no oral cyanosis [Normal Jugular Venous A Waves Present] : normal jugular venous A waves present [Normal Jugular Venous V Waves Present] : normal jugular venous V waves present [No Jugular Venous Dickey A Waves] : no jugular venous dickey A waves [Auscultation Breath Sounds / Voice Sounds] : lungs were clear to auscultation bilaterally [Abdomen Soft] : soft [Abdomen Tenderness] : non-tender [Abdomen Mass (___ Cm)] : no abdominal mass palpated [Abnormal Walk] : normal gait [Gait - Sufficient For Exercise Testing] : the gait was sufficient for exercise testing [Nail Clubbing] : no clubbing of the fingernails [Cyanosis, Localized] : no localized cyanosis [Petechial Hemorrhages (___cm)] : no petechial hemorrhages [Skin Color & Pigmentation] : normal skin color and pigmentation [] : no rash [No Venous Stasis] : no venous stasis [Skin Lesions] : no skin lesions [No Skin Ulcers] : no skin ulcer [No Xanthoma] : no  xanthoma was observed [Oriented To Time, Place, And Person] : oriented to person, place, and time [Affect] : the affect was normal [Mood] : the mood was normal [No Anxiety] : not feeling anxious [Normal Rate] : normal [Normal S1] : normal S1 [Normal S2] : normal S2 [S4] : an S4 was heard [I] : a grade 1 [2+] : left 2+ [1+] : left 1+ [No Abnormalities] : the abdominal aorta was not enlarged and no bruit was heard [No Pitting Edema] : no pitting edema present [FreeTextEntry1] : Decreased breath sounds at both bases [S3] : no S3 [Right Carotid Bruit] : no bruit heard over the right carotid [Left Carotid Bruit] : no bruit heard over the left carotid [Right Femoral Bruit] : no bruit heard over the right femoral artery [Left Femoral Bruit] : no bruit heard over the left femoral artery

## 2018-11-13 NOTE — HISTORY OF PRESENT ILLNESS
[FreeTextEntry1] : was anemic and got iron infusion which did not work and then hospitalized and got 2 untis of blood. Had upper and lower scopes and no source of bleeding found.  Off Eliquis.  Needs small bowel pill study which is scheduled for Friday.When she was anemic very symptomatic but no back to normal. Denies chest pain, shortness of breath, dyspnea on exertion, palpitations, orthopnea, paroxysmal nocturnal dyspnea, claudication, dizziness,  lightheadedness, presyncopal, or syncopal symptoms.\par  \par

## 2018-11-14 PROBLEM — K27.9 PEPTIC ULCER, SITE UNSPECIFIED, UNSPECIFIED AS ACUTE OR CHRONIC, WITHOUT HEMORRHAGE OR PERFORATION: Chronic | Status: ACTIVE | Noted: 2018-11-01

## 2018-11-14 PROBLEM — I48.91 UNSPECIFIED ATRIAL FIBRILLATION: Chronic | Status: ACTIVE | Noted: 2018-11-01

## 2018-11-14 PROBLEM — C34.90 MALIGNANT NEOPLASM OF UNSPECIFIED PART OF UNSPECIFIED BRONCHUS OR LUNG: Chronic | Status: ACTIVE | Noted: 2018-11-01

## 2018-11-14 PROBLEM — D64.9 ANEMIA, UNSPECIFIED: Chronic | Status: ACTIVE | Noted: 2018-11-01

## 2018-11-14 LAB
BASOPHILS # BLD AUTO: 0.09 K/UL
BASOPHILS NFR BLD AUTO: 1.2 %
EOSINOPHIL # BLD AUTO: 0.24 K/UL
EOSINOPHIL NFR BLD AUTO: 3.3 %
FERRITIN SERPL-MCNC: 519 NG/ML
FOLATE SERPL-MCNC: 18.3 NG/ML
HCT VFR BLD CALC: 42.3 %
HGB BLD-MCNC: 13 G/DL
IMM GRANULOCYTES NFR BLD AUTO: 0.3 %
IRON SATN MFR SERPL: 22 %
IRON SERPL-MCNC: 68 UG/DL
LYMPHOCYTES # BLD AUTO: 1.96 K/UL
LYMPHOCYTES NFR BLD AUTO: 26.8 %
MAN DIFF?: NORMAL
MCHC RBC-ENTMCNC: 27.9 PG
MCHC RBC-ENTMCNC: 30.7 GM/DL
MCV RBC AUTO: 90.8 FL
MONOCYTES # BLD AUTO: 0.93 K/UL
MONOCYTES NFR BLD AUTO: 12.7 %
NEUTROPHILS # BLD AUTO: 4.08 K/UL
NEUTROPHILS NFR BLD AUTO: 55.7 %
PLATELET # BLD AUTO: 343 K/UL
RBC # BLD: 4.66 M/UL
RBC # FLD: 21.5 %
TIBC SERPL-MCNC: 308 UG/DL
TRANSFERRIN SERPL-MCNC: 218 MG/DL
UIBC SERPL-MCNC: 240 UG/DL
VIT B12 SERPL-MCNC: 1014 PG/ML
WBC # FLD AUTO: 7.32 K/UL

## 2018-11-15 ENCOUNTER — RX RENEWAL (OUTPATIENT)
Age: 78
End: 2018-11-15

## 2018-12-07 ENCOUNTER — APPOINTMENT (OUTPATIENT)
Dept: CARDIOLOGY | Facility: CLINIC | Age: 78
End: 2018-12-07
Payer: MEDICARE

## 2018-12-07 ENCOUNTER — NON-APPOINTMENT (OUTPATIENT)
Age: 78
End: 2018-12-07

## 2018-12-07 VITALS
WEIGHT: 117 LBS | OXYGEN SATURATION: 97 % | HEIGHT: 62 IN | DIASTOLIC BLOOD PRESSURE: 87 MMHG | HEART RATE: 82 BPM | SYSTOLIC BLOOD PRESSURE: 150 MMHG | BODY MASS INDEX: 21.53 KG/M2

## 2018-12-07 DIAGNOSIS — Z00.00 ENCOUNTER FOR GENERAL ADULT MEDICAL EXAMINATION W/OUT ABNORMAL FINDINGS: ICD-10-CM

## 2018-12-07 PROCEDURE — 99215 OFFICE O/P EST HI 40 MIN: CPT | Mod: 25

## 2018-12-07 PROCEDURE — 93000 ELECTROCARDIOGRAM COMPLETE: CPT

## 2018-12-07 RX ORDER — SOTALOL HYDROCHLORIDE 80 MG/1
80 TABLET ORAL TWICE DAILY
Qty: 180 | Refills: 0 | Status: DISCONTINUED | COMMUNITY
End: 2018-12-07

## 2018-12-07 NOTE — REVIEW OF SYSTEMS
[see HPI] : see HPI [Shortness Of Breath] : no shortness of breath [Dyspnea on exertion] : not dyspnea during exertion [Chest  Pressure] : no chest pressure [Chest Pain] : no chest pain [Lower Ext Edema] : no extremity edema [Leg Claudication] : no intermittent leg claudication [Palpitations] : no palpitations [Negative] : Heme/Lymph

## 2018-12-07 NOTE — DISCUSSION/SUMMARY
[FreeTextEntry1] : Anemia: anemia resolved and now back on Eliquis. Repeat blood work today.\par HTN: Suboptimal control. Add Metoprolol to current regime. Low sodium diet discussed.\par PAF: Off sotalol. Add metoprolol. Back on eliquis.   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

## 2018-12-07 NOTE — PHYSICAL EXAM
[General Appearance - Well Developed] : well developed [Normal Appearance] : normal appearance [Well Groomed] : well groomed [General Appearance - Well Nourished] : well nourished [No Deformities] : no deformities [General Appearance - In No Acute Distress] : no acute distress [Normal Conjunctiva] : the conjunctiva exhibited no abnormalities [Eyelids - No Xanthelasma] : the eyelids demonstrated no xanthelasmas [Normal Oral Mucosa] : normal oral mucosa [No Oral Pallor] : no oral pallor [No Oral Cyanosis] : no oral cyanosis [Normal Jugular Venous A Waves Present] : normal jugular venous A waves present [Normal Jugular Venous V Waves Present] : normal jugular venous V waves present [No Jugular Venous Dickey A Waves] : no jugular venous dickey A waves [Auscultation Breath Sounds / Voice Sounds] : lungs were clear to auscultation bilaterally [FreeTextEntry1] : Decreased breath sounds at both bases [Abdomen Soft] : soft [Abdomen Tenderness] : non-tender [Abdomen Mass (___ Cm)] : no abdominal mass palpated [Abnormal Walk] : normal gait [Gait - Sufficient For Exercise Testing] : the gait was sufficient for exercise testing [Nail Clubbing] : no clubbing of the fingernails [Cyanosis, Localized] : no localized cyanosis [Petechial Hemorrhages (___cm)] : no petechial hemorrhages [Skin Color & Pigmentation] : normal skin color and pigmentation [] : no rash [No Venous Stasis] : no venous stasis [Skin Lesions] : no skin lesions [No Skin Ulcers] : no skin ulcer [No Xanthoma] : no  xanthoma was observed [Oriented To Time, Place, And Person] : oriented to person, place, and time [Affect] : the affect was normal [Mood] : the mood was normal [No Anxiety] : not feeling anxious [Normal Rate] : normal [Normal S1] : normal S1 [Normal S2] : normal S2 [S3] : no S3 [S4] : an S4 was heard [I] : a grade 1 [Right Carotid Bruit] : no bruit heard over the right carotid [Left Carotid Bruit] : no bruit heard over the left carotid [2+] : left 2+ [Right Femoral Bruit] : no bruit heard over the right femoral artery [Left Femoral Bruit] : no bruit heard over the left femoral artery [1+] : left 1+ [No Abnormalities] : the abdominal aorta was not enlarged and no bruit was heard [No Pitting Edema] : no pitting edema present

## 2018-12-07 NOTE — HISTORY OF PRESENT ILLNESS
[FreeTextEntry1] : Anemia resolved with iron therapy and being off eliquis.  Her EP MD Dr. Hunter/Cliff took her off Sotalol and other than palpitations and elevated blood pressure which she was been having off this she is doing ok. No CP or JORGE.  BP has been running high as well. \par  \par

## 2018-12-07 NOTE — CARDIOLOGY SUMMARY
[No Ischemia] : no Ischemia [No Exercise Ind Arr] : no exercise induced arrhythmias [No Symptoms] : no Symptoms [___] : [unfilled] [LVEF ___%] : LVEF [unfilled]% [None] : no pulmonary hypertension [Normal] : normal LA size

## 2018-12-10 LAB
ALBUMIN SERPL ELPH-MCNC: 4 G/DL
ALP BLD-CCNC: 93 U/L
ALT SERPL-CCNC: 23 U/L
ANION GAP SERPL CALC-SCNC: 12 MMOL/L
AST SERPL-CCNC: 18 U/L
BASOPHILS # BLD AUTO: 0.05 K/UL
BASOPHILS NFR BLD AUTO: 0.7 %
BILIRUB SERPL-MCNC: 0.2 MG/DL
BUN SERPL-MCNC: 17 MG/DL
CALCIUM SERPL-MCNC: 9.2 MG/DL
CHLORIDE SERPL-SCNC: 101 MMOL/L
CO2 SERPL-SCNC: 28 MMOL/L
CREAT SERPL-MCNC: 0.87 MG/DL
EOSINOPHIL # BLD AUTO: 0.2 K/UL
EOSINOPHIL NFR BLD AUTO: 3 %
GLUCOSE SERPL-MCNC: 112 MG/DL
HCT VFR BLD CALC: 42.3 %
HGB BLD-MCNC: 13.5 G/DL
IMM GRANULOCYTES NFR BLD AUTO: 0 %
LYMPHOCYTES # BLD AUTO: 1.57 K/UL
LYMPHOCYTES NFR BLD AUTO: 23.3 %
MAN DIFF?: NORMAL
MCHC RBC-ENTMCNC: 28 PG
MCHC RBC-ENTMCNC: 31.9 GM/DL
MCV RBC AUTO: 87.6 FL
MONOCYTES # BLD AUTO: 0.67 K/UL
MONOCYTES NFR BLD AUTO: 9.9 %
NEUTROPHILS # BLD AUTO: 4.25 K/UL
NEUTROPHILS NFR BLD AUTO: 63.1 %
PLATELET # BLD AUTO: 282 K/UL
POTASSIUM SERPL-SCNC: 4.3 MMOL/L
PROT SERPL-MCNC: 6.4 G/DL
RBC # BLD: 4.83 M/UL
RBC # FLD: 22.4 %
SODIUM SERPL-SCNC: 141 MMOL/L
WBC # FLD AUTO: 6.74 K/UL

## 2018-12-28 ENCOUNTER — MEDICATION RENEWAL (OUTPATIENT)
Age: 78
End: 2018-12-28

## 2019-01-18 ENCOUNTER — APPOINTMENT (OUTPATIENT)
Dept: CARDIOLOGY | Facility: CLINIC | Age: 79
End: 2019-01-18
Payer: MEDICARE

## 2019-01-18 ENCOUNTER — NON-APPOINTMENT (OUTPATIENT)
Age: 79
End: 2019-01-18

## 2019-01-18 VITALS
WEIGHT: 121 LBS | OXYGEN SATURATION: 98 % | SYSTOLIC BLOOD PRESSURE: 112 MMHG | BODY MASS INDEX: 22.26 KG/M2 | HEART RATE: 84 BPM | HEIGHT: 62 IN | DIASTOLIC BLOOD PRESSURE: 68 MMHG

## 2019-01-18 PROCEDURE — 99214 OFFICE O/P EST MOD 30 MIN: CPT | Mod: 25

## 2019-01-18 PROCEDURE — 93000 ELECTROCARDIOGRAM COMPLETE: CPT

## 2019-01-18 PROCEDURE — 36415 COLL VENOUS BLD VENIPUNCTURE: CPT

## 2019-01-18 NOTE — HISTORY OF PRESENT ILLNESS
[FreeTextEntry1] : Had 2 days of palpitations 2 days after starting metoprolol but since then has been great and under control.  Tolerating Eliquis without issue.  Denies chest pain, shortness of breath, dyspnea on exertion, palpitations, orthopnea, paroxysmal nocturnal dyspnea, claudication, dizziness,  lightheadedness, presyncopal, or syncopal symptoms.\par \par  \par

## 2019-01-18 NOTE — DISCUSSION/SUMMARY
[FreeTextEntry1] : Anemia: Repeat blood work today. So far has been stable on eliquis.\par HTN: Much better on Metoprolol. Low sodium diet discussed.\par PAF: Metoprolol controlling her so far. Back on eliquis.   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

## 2019-01-20 LAB
ALBUMIN SERPL ELPH-MCNC: 4.3 G/DL
ALP BLD-CCNC: 90 U/L
ALT SERPL-CCNC: 20 U/L
ANION GAP SERPL CALC-SCNC: 12 MMOL/L
AST SERPL-CCNC: 18 U/L
BASOPHILS # BLD AUTO: 0.03 K/UL
BASOPHILS NFR BLD AUTO: 0.4 %
BILIRUB SERPL-MCNC: 0.2 MG/DL
BUN SERPL-MCNC: 19 MG/DL
CALCIUM SERPL-MCNC: 9.8 MG/DL
CHLORIDE SERPL-SCNC: 102 MMOL/L
CO2 SERPL-SCNC: 27 MMOL/L
CREAT SERPL-MCNC: 0.82 MG/DL
EOSINOPHIL # BLD AUTO: 0.19 K/UL
EOSINOPHIL NFR BLD AUTO: 2.3 %
FERRITIN SERPL-MCNC: 167 NG/ML
GLUCOSE SERPL-MCNC: 81 MG/DL
HCT VFR BLD CALC: 40.9 %
HGB BLD-MCNC: 12.9 G/DL
IMM GRANULOCYTES NFR BLD AUTO: 0.1 %
IRON SATN MFR SERPL: 24 %
IRON SERPL-MCNC: 65 UG/DL
LYMPHOCYTES # BLD AUTO: 1.72 K/UL
LYMPHOCYTES NFR BLD AUTO: 20.8 %
MAN DIFF?: NORMAL
MCHC RBC-ENTMCNC: 29.5 PG
MCHC RBC-ENTMCNC: 31.5 GM/DL
MCV RBC AUTO: 93.6 FL
MONOCYTES # BLD AUTO: 0.98 K/UL
MONOCYTES NFR BLD AUTO: 11.9 %
NEUTROPHILS # BLD AUTO: 5.32 K/UL
NEUTROPHILS NFR BLD AUTO: 64.5 %
PLATELET # BLD AUTO: 313 K/UL
POTASSIUM SERPL-SCNC: 5.3 MMOL/L
PROT SERPL-MCNC: 6.7 G/DL
RBC # BLD: 4.37 M/UL
RBC # FLD: 18.3 %
SODIUM SERPL-SCNC: 141 MMOL/L
TIBC SERPL-MCNC: 270 UG/DL
UIBC SERPL-MCNC: 205 UG/DL
WBC # FLD AUTO: 8.25 K/UL

## 2019-02-27 ENCOUNTER — RX RENEWAL (OUTPATIENT)
Age: 79
End: 2019-02-27

## 2019-02-27 ENCOUNTER — MEDICATION RENEWAL (OUTPATIENT)
Age: 79
End: 2019-02-27

## 2019-04-09 ENCOUNTER — RX RENEWAL (OUTPATIENT)
Age: 79
End: 2019-04-09

## 2019-04-14 ENCOUNTER — EMERGENCY (EMERGENCY)
Facility: HOSPITAL | Age: 79
LOS: 0 days | Discharge: HOME | End: 2019-04-14
Attending: EMERGENCY MEDICINE | Admitting: EMERGENCY MEDICINE
Payer: MEDICARE

## 2019-04-14 VITALS
RESPIRATION RATE: 15 BRPM | DIASTOLIC BLOOD PRESSURE: 68 MMHG | OXYGEN SATURATION: 98 % | TEMPERATURE: 98 F | HEART RATE: 81 BPM | HEIGHT: 62 IN | SYSTOLIC BLOOD PRESSURE: 147 MMHG

## 2019-04-14 VITALS — WEIGHT: 119.93 LBS | HEIGHT: 62 IN

## 2019-04-14 DIAGNOSIS — M54.5 LOW BACK PAIN: ICD-10-CM

## 2019-04-14 DIAGNOSIS — Z79.899 OTHER LONG TERM (CURRENT) DRUG THERAPY: ICD-10-CM

## 2019-04-14 DIAGNOSIS — Z85.118 PERSONAL HISTORY OF OTHER MALIGNANT NEOPLASM OF BRONCHUS AND LUNG: ICD-10-CM

## 2019-04-14 DIAGNOSIS — I25.10 ATHEROSCLEROTIC HEART DISEASE OF NATIVE CORONARY ARTERY WITHOUT ANGINA PECTORIS: ICD-10-CM

## 2019-04-14 DIAGNOSIS — I48.91 UNSPECIFIED ATRIAL FIBRILLATION: ICD-10-CM

## 2019-04-14 DIAGNOSIS — D64.9 ANEMIA, UNSPECIFIED: ICD-10-CM

## 2019-04-14 DIAGNOSIS — N12 TUBULO-INTERSTITIAL NEPHRITIS, NOT SPECIFIED AS ACUTE OR CHRONIC: ICD-10-CM

## 2019-04-14 DIAGNOSIS — I25.2 OLD MYOCARDIAL INFARCTION: ICD-10-CM

## 2019-04-14 DIAGNOSIS — Z87.442 PERSONAL HISTORY OF URINARY CALCULI: ICD-10-CM

## 2019-04-14 DIAGNOSIS — R35.0 FREQUENCY OF MICTURITION: ICD-10-CM

## 2019-04-14 DIAGNOSIS — Z79.82 LONG TERM (CURRENT) USE OF ASPIRIN: ICD-10-CM

## 2019-04-14 DIAGNOSIS — K27.9 PEPTIC ULCER, SITE UNSPECIFIED, UNSPECIFIED AS ACUTE OR CHRONIC, WITHOUT HEMORRHAGE OR PERFORATION: ICD-10-CM

## 2019-04-14 LAB
ALBUMIN SERPL ELPH-MCNC: 3.5 G/DL — SIGNIFICANT CHANGE UP (ref 3.3–5)
ALP SERPL-CCNC: 96 U/L — SIGNIFICANT CHANGE UP (ref 40–120)
ALT FLD-CCNC: 24 U/L — SIGNIFICANT CHANGE UP (ref 12–78)
ANION GAP SERPL CALC-SCNC: 8 MMOL/L — SIGNIFICANT CHANGE UP (ref 5–17)
APPEARANCE UR: CLEAR — SIGNIFICANT CHANGE UP
APTT BLD: 32.5 SEC — SIGNIFICANT CHANGE UP (ref 27.5–36.3)
AST SERPL-CCNC: 18 U/L — SIGNIFICANT CHANGE UP (ref 15–37)
BACTERIA # UR AUTO: ABNORMAL
BASOPHILS # BLD AUTO: 0.07 K/UL — SIGNIFICANT CHANGE UP (ref 0–0.2)
BASOPHILS NFR BLD AUTO: 0.9 % — SIGNIFICANT CHANGE UP (ref 0–2)
BILIRUB SERPL-MCNC: 0.3 MG/DL — SIGNIFICANT CHANGE UP (ref 0.2–1.2)
BILIRUB UR-MCNC: ABNORMAL
BUN SERPL-MCNC: 17 MG/DL — SIGNIFICANT CHANGE UP (ref 7–23)
CALCIUM SERPL-MCNC: 8.9 MG/DL — SIGNIFICANT CHANGE UP (ref 8.5–10.1)
CHLORIDE SERPL-SCNC: 107 MMOL/L — SIGNIFICANT CHANGE UP (ref 96–108)
CO2 SERPL-SCNC: 24 MMOL/L — SIGNIFICANT CHANGE UP (ref 22–31)
COLOR SPEC: ABNORMAL
CREAT SERPL-MCNC: 0.76 MG/DL — SIGNIFICANT CHANGE UP (ref 0.5–1.3)
DIFF PNL FLD: ABNORMAL
EOSINOPHIL # BLD AUTO: 0.18 K/UL — SIGNIFICANT CHANGE UP (ref 0–0.5)
EOSINOPHIL NFR BLD AUTO: 2.3 % — SIGNIFICANT CHANGE UP (ref 0–6)
GLUCOSE SERPL-MCNC: 100 MG/DL — HIGH (ref 70–99)
GLUCOSE UR QL: NEGATIVE MG/DL — SIGNIFICANT CHANGE UP
HCT VFR BLD CALC: 40.9 % — SIGNIFICANT CHANGE UP (ref 34.5–45)
HGB BLD-MCNC: 13.4 G/DL — SIGNIFICANT CHANGE UP (ref 11.5–15.5)
IMM GRANULOCYTES NFR BLD AUTO: 0.1 % — SIGNIFICANT CHANGE UP (ref 0–1.5)
INR BLD: 1.37 RATIO — HIGH (ref 0.88–1.16)
KETONES UR-MCNC: ABNORMAL
LEUKOCYTE ESTERASE UR-ACNC: ABNORMAL
LYMPHOCYTES # BLD AUTO: 1.75 K/UL — SIGNIFICANT CHANGE UP (ref 1–3.3)
LYMPHOCYTES # BLD AUTO: 22.6 % — SIGNIFICANT CHANGE UP (ref 13–44)
MCHC RBC-ENTMCNC: 30.9 PG — SIGNIFICANT CHANGE UP (ref 27–34)
MCHC RBC-ENTMCNC: 32.8 GM/DL — SIGNIFICANT CHANGE UP (ref 32–36)
MCV RBC AUTO: 94.2 FL — SIGNIFICANT CHANGE UP (ref 80–100)
MONOCYTES # BLD AUTO: 0.89 K/UL — SIGNIFICANT CHANGE UP (ref 0–0.9)
MONOCYTES NFR BLD AUTO: 11.5 % — SIGNIFICANT CHANGE UP (ref 2–14)
NEUTROPHILS # BLD AUTO: 4.85 K/UL — SIGNIFICANT CHANGE UP (ref 1.8–7.4)
NEUTROPHILS NFR BLD AUTO: 62.6 % — SIGNIFICANT CHANGE UP (ref 43–77)
NITRITE UR-MCNC: NEGATIVE — SIGNIFICANT CHANGE UP
NRBC # BLD: 0 /100 WBCS — SIGNIFICANT CHANGE UP (ref 0–0)
PH UR: 6 — SIGNIFICANT CHANGE UP (ref 5–8)
PLATELET # BLD AUTO: 267 K/UL — SIGNIFICANT CHANGE UP (ref 150–400)
POTASSIUM SERPL-MCNC: 4.2 MMOL/L — SIGNIFICANT CHANGE UP (ref 3.5–5.3)
POTASSIUM SERPL-SCNC: 4.2 MMOL/L — SIGNIFICANT CHANGE UP (ref 3.5–5.3)
PROT SERPL-MCNC: 7 GM/DL — SIGNIFICANT CHANGE UP (ref 6–8.3)
PROT UR-MCNC: 500 MG/DL
PROTHROM AB SERPL-ACNC: 15.3 SEC — HIGH (ref 10–12.9)
RBC # BLD: 4.34 M/UL — SIGNIFICANT CHANGE UP (ref 3.8–5.2)
RBC # FLD: 13.2 % — SIGNIFICANT CHANGE UP (ref 10.3–14.5)
RBC CASTS # UR COMP ASSIST: ABNORMAL /HPF (ref 0–4)
SODIUM SERPL-SCNC: 139 MMOL/L — SIGNIFICANT CHANGE UP (ref 135–145)
SP GR SPEC: 1.02 — SIGNIFICANT CHANGE UP (ref 1.01–1.02)
UROBILINOGEN FLD QL: 1 MG/DL
WBC # BLD: 7.75 K/UL — SIGNIFICANT CHANGE UP (ref 3.8–10.5)
WBC # FLD AUTO: 7.75 K/UL — SIGNIFICANT CHANGE UP (ref 3.8–10.5)
WBC UR QL: SIGNIFICANT CHANGE UP

## 2019-04-14 PROCEDURE — 74176 CT ABD & PELVIS W/O CONTRAST: CPT | Mod: 26

## 2019-04-14 PROCEDURE — 99284 EMERGENCY DEPT VISIT MOD MDM: CPT

## 2019-04-14 RX ORDER — LIDOCAINE 4 G/100G
1 CREAM TOPICAL ONCE
Qty: 0 | Refills: 0 | Status: COMPLETED | OUTPATIENT
Start: 2019-04-14 | End: 2019-04-14

## 2019-04-14 RX ORDER — CEPHALEXIN 500 MG
1 CAPSULE ORAL
Qty: 40 | Refills: 0
Start: 2019-04-14 | End: 2019-04-23

## 2019-04-14 RX ORDER — CEPHALEXIN 500 MG
500 CAPSULE ORAL ONCE
Qty: 0 | Refills: 0 | Status: COMPLETED | OUTPATIENT
Start: 2019-04-14 | End: 2019-04-14

## 2019-04-14 RX ORDER — ACETAMINOPHEN 500 MG
1000 TABLET ORAL ONCE
Qty: 0 | Refills: 0 | Status: COMPLETED | OUTPATIENT
Start: 2019-04-14 | End: 2019-04-14

## 2019-04-14 RX ADMIN — LIDOCAINE 1 PATCH: 4 CREAM TOPICAL at 12:46

## 2019-04-14 RX ADMIN — Medication 500 MILLIGRAM(S): at 13:25

## 2019-04-14 RX ADMIN — Medication 1000 MILLIGRAM(S): at 12:45

## 2019-04-14 RX ADMIN — Medication 1000 MILLIGRAM(S): at 13:26

## 2019-04-14 NOTE — ED STATDOCS - OBJECTIVE STATEMENT
80 y/o female with a PMHx of HTN, kidney stones, Afib with loop recorder, CAD, MI, PUD, lung CA (currently in remission, never on chemo or radiation) treated at [place] presents to the ED c/o constant right lower back pain x1 week, worsening with positional changes. +urinary frequency. Denies pain/burning with urination, CP, SOB, cough, abd pain, vomiting, diarrhea, numbness/weakness in legs, urinary or bowel incontinence. Pt reports she has a hx of kidney stones. No recent physical activity or trauma. Currently anticoagulated.

## 2019-04-14 NOTE — ED STATDOCS - CLINICAL SUMMARY MEDICAL DECISION MAKING FREE TEXT BOX
Pt with right flank pain, consider UTI vs pyelo vs renal stone vs lumbar strain Pt with right flank pain, consider UTI vs pyelo vs renal stone vs lumbar strain - well appearing, afebrile.

## 2019-04-14 NOTE — ED STATDOCS - NSFOLLOWUPINSTRUCTIONS_ED_ALL_ED_FT
Kidney Infection    WHAT YOU NEED TO KNOW:    A kidney infection, or pyelonephritis, is a bacterial infection. The infection usually starts in your bladder or urethra and moves into your kidney. One or both kidneys may be infected. Kidney, Ureters, Bladder         DISCHARGE INSTRUCTIONS:    Return to the emergency department if:     You have a fever and chills.       You cannot stop vomiting.      You have severe pain in your abdomen, lower back, or sides.    Contact your healthcare provider if:     You continue to have a fever after you take antibiotics for 3 days.      You have pain when you urinate, even after treatment.      Your signs and symptoms return.      You have questions or concerns about your condition or care.    Medicines: You may need any of the following:     Antibiotics treat your bacterial infection.       Acetaminophen decreases pain and fever. It is available without a doctor's order. Ask how much to take and how often to take it. Follow directions. Read the labels of all other medicines you are using to see if they also contain acetaminophen, or ask your doctor or pharmacist. Acetaminophen can cause liver damage if not taken correctly. Do not use more than 4 grams (4,000 milligrams) total of acetaminophen in one day.       NSAIDs, such as ibuprofen, help decrease swelling, pain, and fever. This medicine is available with or without a doctor's order. NSAIDs can cause stomach bleeding or kidney problems in certain people. If you take blood thinner medicine, always ask if NSAIDs are safe for you. Always read the medicine label and follow directions. Do not give these medicines to children under 6 months of age without direction from your child's healthcare provider.      Prescription pain medicine may be given. Ask how to take this medicine safely.      Take your medicine as directed. Contact your healthcare provider if you think your medicine is not helping or if you have side effects. Tell him of her if you are allergic to any medicine. Keep a list of the medicines, vitamins, and herbs you take. Include the amounts, and when and why you take them. Bring the list or the pill bottles to follow-up visits. Carry your medicine list with you in case of an emergency.    Drink liquids as directed: You may need to drink extra liquids to help flush your kidneys and urinary system. Water is the best liquid to drink. Ask your healthcare provider how much liquid to drink each day and which liquids are best for you.    Urinate as soon as you feel the urge: This will help flush bacteria from your urinary system. Do not wait or hold your urine for too long.     Clean your genital area every day with soap and water: Wipe from front to back after you urinate or have a bowel movement. Wear cotton underwear. Fabrics such as nylon and polyester can stay damp. This can increase your risk for infection. Urinate within 15 minutes after you have sex.    Follow up with your healthcare provider as directed: Write down your questions so you remember to ask them during your visits.     SEE DR TRUONG TOMORROW IN HIS OFFICE.

## 2019-04-14 NOTE — ED STATDOCS - ATTENDING CONTRIBUTION TO CARE
I, Ben Monson MD, personally saw the patient with ACP.  I have personally performed a face to face diagnostic evaluation on this patient.  I have reviewed the ACP note and agree with the history, exam, and plan of care, except as noted.

## 2019-04-14 NOTE — ED ADULT TRIAGE NOTE - CHIEF COMPLAINT QUOTE
c/o R sided flank pain for a couple of days with urinary frequency. no dysuria or bleeding while urinating

## 2019-04-14 NOTE — ED STATDOCS - PROGRESS NOTE DETAILS
signed Marine Serrato PA-C Pt seen and evaluated initially in intake by Dr. Monson.   79F c/o right flank pain x 1 week and urinary frequency. No significant findings on labwork. +apparent uti on UA. CT only significant for incidental vascular calcifications, left renal atrophy, renal cysts, and left adnexal cyst, all of which pt was already aware of. Advise pt f/u with GYN for eval of increased size of left adnexal cyst. Will treat for pyelonephritis. Pt will f/u with urology Dr Simmons tomorrow. return precautions given. Pt alert, NAD, smiling and non toxic. rx keflex. pt given copy of labwork and imaging results.

## 2019-04-14 NOTE — ED STATDOCS - NS_ ATTENDINGSCRIBEDETAILS _ED_A_ED_FT
The scribe's documentation has been prepared under my direction and personally reviewed by me in its entirety. I confirm that the note above accurately reflects all work, treatment, procedures, and medical decision-making performed by me.  Ben Monson MD

## 2019-04-15 ENCOUNTER — APPOINTMENT (OUTPATIENT)
Age: 79
End: 2019-04-15
Payer: MEDICARE

## 2019-04-15 ENCOUNTER — RESULT CHARGE (OUTPATIENT)
Age: 79
End: 2019-04-15

## 2019-04-15 VITALS
OXYGEN SATURATION: 97 % | WEIGHT: 120 LBS | TEMPERATURE: 97.8 F | SYSTOLIC BLOOD PRESSURE: 146 MMHG | BODY MASS INDEX: 22.08 KG/M2 | HEIGHT: 62 IN | HEART RATE: 62 BPM | DIASTOLIC BLOOD PRESSURE: 73 MMHG

## 2019-04-15 LAB
BILIRUB UR QL STRIP: NORMAL
CLARITY UR: CLEAR
COLLECTION METHOD: NORMAL
CULTURE RESULTS: SIGNIFICANT CHANGE UP
GLUCOSE UR-MCNC: NEGATIVE
HCG UR QL: 0.2 EU/DL
HGB UR QL STRIP.AUTO: NORMAL
KETONES UR-MCNC: NORMAL
LEUKOCYTE ESTERASE UR QL STRIP: NEGATIVE
NITRITE UR QL STRIP: NEGATIVE
PH UR STRIP: 5.5
PROT UR STRIP-MCNC: NORMAL
SP GR UR STRIP: >=1.03
SPECIMEN SOURCE: SIGNIFICANT CHANGE UP

## 2019-04-15 PROCEDURE — 99204 OFFICE O/P NEW MOD 45 MIN: CPT

## 2019-04-15 NOTE — REVIEW OF SYSTEMS
[both] : pain during and after intercourse [denies] : denies pain with orgasm [Urine Infection (bladder/kidney)] : bladder/kidney infection [Strong urge to urinate] : strong urge to urinate [Slow urine stream] : slow urine stream [Negative] : Heme/Lymph

## 2019-04-16 ENCOUNTER — NON-APPOINTMENT (OUTPATIENT)
Age: 79
End: 2019-04-16

## 2019-04-16 ENCOUNTER — APPOINTMENT (OUTPATIENT)
Dept: CARDIOLOGY | Facility: CLINIC | Age: 79
End: 2019-04-16
Payer: MEDICARE

## 2019-04-16 VITALS
SYSTOLIC BLOOD PRESSURE: 156 MMHG | HEART RATE: 58 BPM | RESPIRATION RATE: 16 BRPM | DIASTOLIC BLOOD PRESSURE: 76 MMHG | TEMPERATURE: 98.1 F | OXYGEN SATURATION: 96 % | BODY MASS INDEX: 22.82 KG/M2 | WEIGHT: 124 LBS | HEIGHT: 62 IN

## 2019-04-16 DIAGNOSIS — A49.9 URINARY TRACT INFECTION, SITE NOT SPECIFIED: ICD-10-CM

## 2019-04-16 DIAGNOSIS — N39.0 URINARY TRACT INFECTION, SITE NOT SPECIFIED: ICD-10-CM

## 2019-04-16 LAB
APPEARANCE: CLEAR
BACTERIA UR CULT: NORMAL
BACTERIA: NEGATIVE
BILIRUBIN URINE: NEGATIVE
BLOOD URINE: NORMAL
CALCIUM OXALATE CRYSTALS: ABNORMAL
COLOR: YELLOW
GLUCOSE QUALITATIVE U: NEGATIVE
HYALINE CASTS: 0 /LPF
KETONES URINE: NEGATIVE
LEUKOCYTE ESTERASE URINE: NEGATIVE
MICROSCOPIC-UA: NORMAL
NITRITE URINE: NEGATIVE
PH URINE: 6
PROTEIN URINE: ABNORMAL
RED BLOOD CELLS URINE: 1 /HPF
SPECIFIC GRAVITY URINE: 1.04
SQUAMOUS EPITHELIAL CELLS: 4 /HPF
UROBILINOGEN URINE: ABNORMAL
WHITE BLOOD CELLS URINE: 2 /HPF

## 2019-04-16 PROCEDURE — 99214 OFFICE O/P EST MOD 30 MIN: CPT | Mod: 25

## 2019-04-16 PROCEDURE — 93000 ELECTROCARDIOGRAM COMPLETE: CPT

## 2019-04-16 NOTE — PHYSICAL EXAM
[Well Groomed] : well groomed [General Appearance - Well Developed] : well developed [Normal Appearance] : normal appearance [General Appearance - In No Acute Distress] : no acute distress [No Deformities] : no deformities [General Appearance - Well Nourished] : well nourished [Normal Oral Mucosa] : normal oral mucosa [Normal Conjunctiva] : the conjunctiva exhibited no abnormalities [Eyelids - No Xanthelasma] : the eyelids demonstrated no xanthelasmas [No Oral Cyanosis] : no oral cyanosis [No Oral Pallor] : no oral pallor [Normal Jugular Venous V Waves Present] : normal jugular venous V waves present [Normal Jugular Venous A Waves Present] : normal jugular venous A waves present [No Jugular Venous Dickey A Waves] : no jugular venous dickey A waves [Auscultation Breath Sounds / Voice Sounds] : lungs were clear to auscultation bilaterally [FreeTextEntry1] : Decreased breath sounds at both bases [Abdomen Soft] : soft [Abdomen Tenderness] : non-tender [Abnormal Walk] : normal gait [Abdomen Mass (___ Cm)] : no abdominal mass palpated [Gait - Sufficient For Exercise Testing] : the gait was sufficient for exercise testing [Nail Clubbing] : no clubbing of the fingernails [Cyanosis, Localized] : no localized cyanosis [Skin Color & Pigmentation] : normal skin color and pigmentation [Petechial Hemorrhages (___cm)] : no petechial hemorrhages [Skin Lesions] : no skin lesions [No Venous Stasis] : no venous stasis [No Skin Ulcers] : no skin ulcer [] : no rash [No Xanthoma] : no  xanthoma was observed [Oriented To Time, Place, And Person] : oriented to person, place, and time [No Anxiety] : not feeling anxious [Mood] : the mood was normal [Affect] : the affect was normal [Normal Rate] : normal [Normal S1] : normal S1 [Normal S2] : normal S2 [S4] : an S4 was heard [S3] : no S3 [I] : a grade 1 [Left Carotid Bruit] : no bruit heard over the left carotid [Right Carotid Bruit] : no bruit heard over the right carotid [2+] : left 2+ [Right Femoral Bruit] : no bruit heard over the right femoral artery [Left Femoral Bruit] : no bruit heard over the left femoral artery [No Abnormalities] : the abdominal aorta was not enlarged and no bruit was heard [1+] : right 1+ [No Pitting Edema] : no pitting edema present

## 2019-04-16 NOTE — DISCUSSION/SUMMARY
[FreeTextEntry1] : Possible recurrence of kidney stones: following with urology. \par Anemia: Stable on eliquis.\par HTN: Reasonable on Metoprolol. Low sodium diet discussed.\par PAF: Metoprolol controlling her so far. Continue eliquis.   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

## 2019-04-16 NOTE — HISTORY OF PRESENT ILLNESS
[FreeTextEntry1] : Called me sat. as she was having flank pain and I referred her to ER.  Seen and given Keflex for possible UTI and went for follow up With Dr. Day ([facility]). He retested her urine and did not show infection but suggests kidney stone (Ca oxalate crystals and blood in urine).  On abx but pain is better.  Has f/u with him coming up.  Did have some protein in urine as well which will be followed up. \par  \par

## 2019-04-16 NOTE — REASON FOR VISIT
[Follow-Up - Clinic] : a clinic follow-up of [Coronary Artery Disease] : coronary artery disease [Hyperlipidemia] : hyperlipidemia [Hypertension] : hypertension [Prior Myocardial Infarction] : a prior myocardial infarction [Peripheral Vascular Disease] : peripheral vascular disease [FreeTextEntry1] : AAA, pre op cardiac  evaluation

## 2019-04-16 NOTE — CARDIOLOGY SUMMARY
[No Symptoms] : no Symptoms [No Ischemia] : no Ischemia [No Exercise Ind Arr] : no exercise induced arrhythmias [LVEF ___%] : LVEF [unfilled]% [___] : [unfilled] [None] : normal LV function [Normal] : normal LA size

## 2019-04-16 NOTE — REVIEW OF SYSTEMS
[see HPI] : see HPI [Shortness Of Breath] : no shortness of breath [Dyspnea on exertion] : not dyspnea during exertion [Chest  Pressure] : no chest pressure [Chest Pain] : no chest pain [Leg Claudication] : no intermittent leg claudication [Lower Ext Edema] : no extremity edema [Palpitations] : no palpitations [Negative] : Heme/Lymph

## 2019-04-29 ENCOUNTER — APPOINTMENT (OUTPATIENT)
Dept: UROLOGY | Facility: CLINIC | Age: 79
End: 2019-04-29
Payer: MEDICARE

## 2019-04-29 VITALS
WEIGHT: 124 LBS | HEIGHT: 62 IN | HEART RATE: 66 BPM | SYSTOLIC BLOOD PRESSURE: 152 MMHG | DIASTOLIC BLOOD PRESSURE: 74 MMHG | TEMPERATURE: 98.1 F | BODY MASS INDEX: 22.82 KG/M2 | OXYGEN SATURATION: 96 %

## 2019-04-29 PROCEDURE — 99213 OFFICE O/P EST LOW 20 MIN: CPT

## 2019-04-29 NOTE — ASSESSMENT
[FreeTextEntry1] : 78 yo F with frequency, urgency, dysuria. DDx include UTI and OAB. \par - UA, UCx empiric ABx. RTO in 2 weeks for symptom check

## 2019-04-29 NOTE — HISTORY OF PRESENT ILLNESS
[FreeTextEntry1] : 79 year old woman seen 04/15/2019 with complaint of urinary frequency, urgency, and dysuria, nocturia. This began weeks ago. It is moderate in severity. Nothing makes the symptoms better, nothing makes sx worse. It is associated with nothing.No hematuria,  no hesitancy, no straining. No incontinence. \par No fevers, no chills, no nausea, no vomiting, no flank pain. No family history contributory to urinary urgency. \par \par 04/29/2019: Patient presents for follow up. UCx from last visit was negative. UA showed only Calcium Oxalate crystals. CT was negative for stones. She states that her frequency, urgency remain unchanged. Most bothersome is her nocturia which she states occurs every 2 hours. No hematuria, no dysuria, no hesitancy, no straining. No incontinence. No fevers, no chills, no nausea, no vomiting, no flank pain.

## 2019-04-29 NOTE — HISTORY OF PRESENT ILLNESS
[FreeTextEntry1] : 79 year old woman seen 04/15/2019 with complaint of urinary frequency, urgency, and dysuria, nocturia. This began weeks ago. It is moderate in severity. Nothing makes the symptoms better, nothing makes sx worse. It is associated with nothing.No hematuria,  no hesitancy, no straining. No incontinence. \par No fevers, no chills, no nausea, no vomiting, no flank pain. No family history contributory to urinary urgency.

## 2019-04-29 NOTE — REVIEW OF SYSTEMS
[see HPI] : see HPI [Negative] : Heme/Lymph [both] : pain during and after intercourse [denies] : denies pain with orgasm [Urine Infection (bladder/kidney)] : bladder/kidney infection [Strong urge to urinate] : strong urge to urinate [Slow urine stream] : slow urine stream

## 2019-04-29 NOTE — ASSESSMENT
[FreeTextEntry1] : 79 year old F  with nocturia. For nocturia, we discussed that nocturia can be the result of nocturnal polyuria, global polyuria, or diminished bladder capacity either globally or nocturnally. The underlying cause of nocturia can be difficult to discern but include conditions that cause lower extremity edema such as CHF, venous insufficiency. Other causes include CHF, Diabetes mellitus, diabetes insipidus, and others. If any of these conditions are present, they should be addressed before urologic treatments are tried. Nocturia can be caused or exacerbated by BPH or OAB. If these are present, they can be treated. OAB and BASS treatments have been shown to only suboptimally treat nocturia, but should be attempted. We discussed that the best initial tool in the work up for nocturia is a voiding diary. Depending on the results of the diary, treatment options include medications for OAB such as anticholinergics or beta agonists, medications for BASS such as alpha blockers, behavioral modifications such as stopping fluid intake after 6 PM, or desmopressin to reduce nocturnal polyuria. \par \par Pt opts for only voiding diary at this time, and wishes to hold off on medication for now.

## 2019-04-30 LAB
APPEARANCE: ABNORMAL
BACTERIA: NEGATIVE
BILIRUBIN URINE: ABNORMAL
BLOOD URINE: NEGATIVE
CALCIUM OXALATE CRYSTALS: ABNORMAL
COLOR: ABNORMAL
GLUCOSE QUALITATIVE U: NEGATIVE
HYALINE CASTS: 0 /LPF
KETONES URINE: NORMAL
LEUKOCYTE ESTERASE URINE: NEGATIVE
MICROSCOPIC-UA: NORMAL
NITRITE URINE: NEGATIVE
PH URINE: 6
PROTEIN URINE: ABNORMAL
RED BLOOD CELLS URINE: 0 /HPF
SPECIFIC GRAVITY URINE: 1.03
SQUAMOUS EPITHELIAL CELLS: 3 /HPF
UROBILINOGEN URINE: ABNORMAL
WHITE BLOOD CELLS URINE: 1 /HPF

## 2019-05-02 LAB — BACTERIA UR CULT: NORMAL

## 2019-05-19 ENCOUNTER — RX RENEWAL (OUTPATIENT)
Age: 79
End: 2019-05-19

## 2019-05-29 ENCOUNTER — MEDICATION RENEWAL (OUTPATIENT)
Age: 79
End: 2019-05-29

## 2019-05-29 ENCOUNTER — RX RENEWAL (OUTPATIENT)
Age: 79
End: 2019-05-29

## 2019-05-30 ENCOUNTER — MEDICATION RENEWAL (OUTPATIENT)
Age: 79
End: 2019-05-30

## 2019-05-31 ENCOUNTER — APPOINTMENT (OUTPATIENT)
Dept: UROLOGY | Facility: CLINIC | Age: 79
End: 2019-05-31

## 2019-06-11 ENCOUNTER — APPOINTMENT (OUTPATIENT)
Dept: CARDIOLOGY | Facility: CLINIC | Age: 79
End: 2019-06-11

## 2019-08-23 ENCOUNTER — RX RENEWAL (OUTPATIENT)
Age: 79
End: 2019-08-23

## 2019-08-26 ENCOUNTER — MEDICATION RENEWAL (OUTPATIENT)
Age: 79
End: 2019-08-26

## 2019-09-19 ENCOUNTER — OTHER (OUTPATIENT)
Age: 79
End: 2019-09-19

## 2019-10-25 ENCOUNTER — NON-APPOINTMENT (OUTPATIENT)
Age: 79
End: 2019-10-25

## 2019-10-25 ENCOUNTER — APPOINTMENT (OUTPATIENT)
Dept: CARDIOLOGY | Facility: CLINIC | Age: 79
End: 2019-10-25
Payer: MEDICARE

## 2019-10-25 VITALS
DIASTOLIC BLOOD PRESSURE: 75 MMHG | HEIGHT: 62 IN | SYSTOLIC BLOOD PRESSURE: 144 MMHG | WEIGHT: 130 LBS | HEART RATE: 63 BPM | OXYGEN SATURATION: 98 % | BODY MASS INDEX: 23.92 KG/M2

## 2019-10-25 DIAGNOSIS — Z87.442 PERSONAL HISTORY OF URINARY CALCULI: ICD-10-CM

## 2019-10-25 DIAGNOSIS — Z87.448 PERSONAL HISTORY OF OTHER DISEASES OF URINARY SYSTEM: ICD-10-CM

## 2019-10-25 DIAGNOSIS — Z87.898 PERSONAL HISTORY OF OTHER SPECIFIED CONDITIONS: ICD-10-CM

## 2019-10-25 PROCEDURE — 93000 ELECTROCARDIOGRAM COMPLETE: CPT

## 2019-10-25 PROCEDURE — 99215 OFFICE O/P EST HI 40 MIN: CPT

## 2019-10-25 RX ORDER — RANITIDINE 150 MG/1
150 TABLET ORAL
Qty: 180 | Refills: 3 | Status: DISCONTINUED | COMMUNITY
Start: 2018-06-19 | End: 2019-10-25

## 2019-10-25 RX ORDER — AMLODIPINE BESYLATE 5 MG/1
5 TABLET ORAL DAILY
Qty: 30 | Refills: 0 | Status: DISCONTINUED | COMMUNITY
Start: 2018-05-09 | End: 2019-10-25

## 2019-10-25 RX ORDER — ALBUTEROL SULFATE 90 UG/1
108 (90 BASE) INHALANT RESPIRATORY (INHALATION)
Qty: 34 | Refills: 0 | Status: DISCONTINUED | COMMUNITY
Start: 2019-05-19 | End: 2019-10-25

## 2019-10-25 RX ORDER — LOSARTAN POTASSIUM 25 MG/1
25 TABLET, FILM COATED ORAL
Refills: 0 | Status: DISCONTINUED | COMMUNITY
End: 2019-10-25

## 2019-10-25 RX ORDER — RANITIDINE 150 MG/1
150 TABLET ORAL
Qty: 180 | Refills: 0 | Status: DISCONTINUED | COMMUNITY
End: 2019-10-25

## 2019-10-25 RX ORDER — CEPHALEXIN 500 MG/1
500 CAPSULE ORAL
Qty: 40 | Refills: 0 | Status: DISCONTINUED | COMMUNITY
Start: 2019-04-14 | End: 2019-10-25

## 2019-10-25 RX ORDER — METOPROLOL SUCCINATE 50 MG/1
50 TABLET, EXTENDED RELEASE ORAL DAILY
Qty: 135 | Refills: 3 | Status: ACTIVE | COMMUNITY
Start: 2018-12-07 | End: 1900-01-01

## 2019-10-25 NOTE — PHYSICAL EXAM
[General Appearance - Well Developed] : well developed [Normal Appearance] : normal appearance [General Appearance - Well Nourished] : well nourished [Well Groomed] : well groomed [No Deformities] : no deformities [General Appearance - In No Acute Distress] : no acute distress [Eyelids - No Xanthelasma] : the eyelids demonstrated no xanthelasmas [Normal Conjunctiva] : the conjunctiva exhibited no abnormalities [No Oral Pallor] : no oral pallor [No Oral Cyanosis] : no oral cyanosis [Normal Oral Mucosa] : normal oral mucosa [Normal Jugular Venous A Waves Present] : normal jugular venous A waves present [Normal Jugular Venous V Waves Present] : normal jugular venous V waves present [No Jugular Venous Dickey A Waves] : no jugular venous dickey A waves [Auscultation Breath Sounds / Voice Sounds] : lungs were clear to auscultation bilaterally [FreeTextEntry1] : Decreased breath sounds at both bases [Abdomen Soft] : soft [Abdomen Tenderness] : non-tender [Abnormal Walk] : normal gait [Abdomen Mass (___ Cm)] : no abdominal mass palpated [Nail Clubbing] : no clubbing of the fingernails [Gait - Sufficient For Exercise Testing] : the gait was sufficient for exercise testing [Petechial Hemorrhages (___cm)] : no petechial hemorrhages [Cyanosis, Localized] : no localized cyanosis [No Venous Stasis] : no venous stasis [Skin Color & Pigmentation] : normal skin color and pigmentation [] : no rash [No Xanthoma] : no  xanthoma was observed [Skin Lesions] : no skin lesions [No Skin Ulcers] : no skin ulcer [Mood] : the mood was normal [Affect] : the affect was normal [Oriented To Time, Place, And Person] : oriented to person, place, and time [Normal Rate] : normal [No Anxiety] : not feeling anxious [Normal S1] : normal S1 [S4] : an S4 was heard [Normal S2] : normal S2 [S3] : no S3 [I] : a grade 1 [Right Carotid Bruit] : no bruit heard over the right carotid [Left Carotid Bruit] : no bruit heard over the left carotid [Right Femoral Bruit] : no bruit heard over the right femoral artery [2+] : left 2+ [Left Femoral Bruit] : no bruit heard over the left femoral artery [No Abnormalities] : the abdominal aorta was not enlarged and no bruit was heard [1+] : left 1+ [No Pitting Edema] : no pitting edema present

## 2019-10-25 NOTE — DISCUSSION/SUMMARY
[FreeTextEntry1] : PAF: Increase Metoprolol 1.5 tabs QD (75 mg). Continue Eliquis.   \par Anemia: Stable on Eliquis.\par HTN: Mildly elevated and will see response to metoprolol increase. 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

## 2019-10-25 NOTE — REVIEW OF SYSTEMS
[see HPI] : see HPI [Chest  Pressure] : no chest pressure [Shortness Of Breath] : no shortness of breath [Dyspnea on exertion] : not dyspnea during exertion [Lower Ext Edema] : no extremity edema [Chest Pain] : no chest pain [Leg Claudication] : no intermittent leg claudication [Palpitations] : no palpitations [Negative] : Heme/Lymph

## 2019-10-25 NOTE — HISTORY OF PRESENT ILLNESS
[FreeTextEntry1] : feels well.  Over summer was having increased palpitations and saw Dr. Hunter (EP) and discussed with him and he wanted to de an ablation but after she discovered she was drinking more ice tea over summer (caffeinated) she cut back and feels much better.  Her Loop recorder also showed improvement (she reports).  Reviewed her blood work and results were good. Stopped Zantac due to recall and will change her to Pepcid. Denies chest pain, shortness of breath, dyspnea on exertion, orthopnea, paroxysmal nocturnal dyspnea, claudication, dizziness,  lightheadedness, presyncopal, or syncopal symptoms. \par \par

## 2019-10-25 NOTE — CARDIOLOGY SUMMARY
[No Exercise Ind Arr] : no exercise induced arrhythmias [No Ischemia] : no Ischemia [No Symptoms] : no Symptoms [___] : [unfilled] [LVEF ___%] : LVEF [unfilled]% [Normal] : normal LA size [None] : no pulmonary hypertension

## 2019-11-21 ENCOUNTER — MEDICATION RENEWAL (OUTPATIENT)
Age: 79
End: 2019-11-21

## 2019-12-10 ENCOUNTER — RX RENEWAL (OUTPATIENT)
Age: 79
End: 2019-12-10

## 2020-01-03 ENCOUNTER — INPATIENT (INPATIENT)
Facility: HOSPITAL | Age: 80
LOS: 4 days | Discharge: HOME | DRG: 377 | End: 2020-01-08
Attending: FAMILY MEDICINE | Admitting: INTERNAL MEDICINE
Payer: MEDICARE

## 2020-01-03 VITALS — HEIGHT: 62 IN | WEIGHT: 119.93 LBS

## 2020-01-03 DIAGNOSIS — K92.2 GASTROINTESTINAL HEMORRHAGE, UNSPECIFIED: ICD-10-CM

## 2020-01-03 LAB
ALBUMIN SERPL ELPH-MCNC: 3 G/DL — LOW (ref 3.3–5)
ALP SERPL-CCNC: 66 U/L — SIGNIFICANT CHANGE UP (ref 40–120)
ALT FLD-CCNC: 20 U/L — SIGNIFICANT CHANGE UP (ref 12–78)
ANION GAP SERPL CALC-SCNC: 5 MMOL/L — SIGNIFICANT CHANGE UP (ref 5–17)
APTT BLD: 31.2 SEC — SIGNIFICANT CHANGE UP (ref 27.5–36.3)
AST SERPL-CCNC: 17 U/L — SIGNIFICANT CHANGE UP (ref 15–37)
BILIRUB SERPL-MCNC: 0.2 MG/DL — SIGNIFICANT CHANGE UP (ref 0.2–1.2)
BUN SERPL-MCNC: 29 MG/DL — HIGH (ref 7–23)
CALCIUM SERPL-MCNC: 8.7 MG/DL — SIGNIFICANT CHANGE UP (ref 8.5–10.1)
CHLORIDE SERPL-SCNC: 106 MMOL/L — SIGNIFICANT CHANGE UP (ref 96–108)
CO2 SERPL-SCNC: 27 MMOL/L — SIGNIFICANT CHANGE UP (ref 22–31)
CREAT SERPL-MCNC: 0.81 MG/DL — SIGNIFICANT CHANGE UP (ref 0.5–1.3)
GLUCOSE SERPL-MCNC: 113 MG/DL — HIGH (ref 70–99)
HCT VFR BLD CALC: 22.1 % — LOW (ref 34.5–45)
HGB BLD-MCNC: 7 G/DL — CRITICAL LOW (ref 11.5–15.5)
INR BLD: 1.56 RATIO — HIGH (ref 0.88–1.16)
MCHC RBC-ENTMCNC: 29 PG — SIGNIFICANT CHANGE UP (ref 27–34)
MCHC RBC-ENTMCNC: 31.7 GM/DL — LOW (ref 32–36)
MCV RBC AUTO: 91.7 FL — SIGNIFICANT CHANGE UP (ref 80–100)
NT-PROBNP SERPL-SCNC: 5960 PG/ML — HIGH (ref 0–450)
PLATELET # BLD AUTO: 284 K/UL — SIGNIFICANT CHANGE UP (ref 150–400)
POTASSIUM SERPL-MCNC: 3.7 MMOL/L — SIGNIFICANT CHANGE UP (ref 3.5–5.3)
POTASSIUM SERPL-SCNC: 3.7 MMOL/L — SIGNIFICANT CHANGE UP (ref 3.5–5.3)
PROT SERPL-MCNC: 6.1 GM/DL — SIGNIFICANT CHANGE UP (ref 6–8.3)
PROTHROM AB SERPL-ACNC: 17.6 SEC — HIGH (ref 10–12.9)
RBC # BLD: 2.41 M/UL — LOW (ref 3.8–5.2)
RBC # FLD: 14.4 % — SIGNIFICANT CHANGE UP (ref 10.3–14.5)
SODIUM SERPL-SCNC: 138 MMOL/L — SIGNIFICANT CHANGE UP (ref 135–145)
TROPONIN I SERPL-MCNC: 0.12 NG/ML — HIGH (ref 0.01–0.04)
WBC # BLD: 9.38 K/UL — SIGNIFICANT CHANGE UP (ref 3.8–10.5)
WBC # FLD AUTO: 9.38 K/UL — SIGNIFICANT CHANGE UP (ref 3.8–10.5)

## 2020-01-03 PROCEDURE — 85730 THROMBOPLASTIN TIME PARTIAL: CPT

## 2020-01-03 PROCEDURE — 82550 ASSAY OF CK (CPK): CPT

## 2020-01-03 PROCEDURE — 71046 X-RAY EXAM CHEST 2 VIEWS: CPT | Mod: 26

## 2020-01-03 PROCEDURE — C1887: CPT

## 2020-01-03 PROCEDURE — 80053 COMPREHEN METABOLIC PANEL: CPT

## 2020-01-03 PROCEDURE — 93010 ELECTROCARDIOGRAM REPORT: CPT | Mod: 76

## 2020-01-03 PROCEDURE — 84484 ASSAY OF TROPONIN QUANT: CPT

## 2020-01-03 PROCEDURE — 88305 TISSUE EXAM BY PATHOLOGIST: CPT

## 2020-01-03 PROCEDURE — 85610 PROTHROMBIN TIME: CPT

## 2020-01-03 PROCEDURE — 80048 BASIC METABOLIC PNL TOTAL CA: CPT

## 2020-01-03 PROCEDURE — 86923 COMPATIBILITY TEST ELECTRIC: CPT

## 2020-01-03 PROCEDURE — 84100 ASSAY OF PHOSPHORUS: CPT

## 2020-01-03 PROCEDURE — 84132 ASSAY OF SERUM POTASSIUM: CPT

## 2020-01-03 PROCEDURE — 86850 RBC ANTIBODY SCREEN: CPT

## 2020-01-03 PROCEDURE — C1894: CPT

## 2020-01-03 PROCEDURE — 88342 IMHCHEM/IMCYTCHM 1ST ANTB: CPT

## 2020-01-03 PROCEDURE — 93567 NJX CAR CTH SPRVLV AORTGRPHY: CPT

## 2020-01-03 PROCEDURE — 93005 ELECTROCARDIOGRAM TRACING: CPT

## 2020-01-03 PROCEDURE — 85025 COMPLETE CBC W/AUTO DIFF WBC: CPT

## 2020-01-03 PROCEDURE — 85014 HEMATOCRIT: CPT

## 2020-01-03 PROCEDURE — 83735 ASSAY OF MAGNESIUM: CPT

## 2020-01-03 PROCEDURE — C9113: CPT

## 2020-01-03 PROCEDURE — 86901 BLOOD TYPING SEROLOGIC RH(D): CPT

## 2020-01-03 PROCEDURE — 85027 COMPLETE CBC AUTOMATED: CPT

## 2020-01-03 PROCEDURE — 36415 COLL VENOUS BLD VENIPUNCTURE: CPT

## 2020-01-03 PROCEDURE — 86900 BLOOD TYPING SEROLOGIC ABO: CPT

## 2020-01-03 PROCEDURE — 36430 TRANSFUSION BLD/BLD COMPNT: CPT

## 2020-01-03 PROCEDURE — 93458 L HRT ARTERY/VENTRICLE ANGIO: CPT

## 2020-01-03 PROCEDURE — 93306 TTE W/DOPPLER COMPLETE: CPT

## 2020-01-03 PROCEDURE — P9016: CPT

## 2020-01-03 PROCEDURE — 85018 HEMOGLOBIN: CPT

## 2020-01-03 PROCEDURE — C1769: CPT

## 2020-01-03 PROCEDURE — C1760: CPT

## 2020-01-03 RX ORDER — PANTOPRAZOLE SODIUM 20 MG/1
40 TABLET, DELAYED RELEASE ORAL ONCE
Refills: 0 | Status: COMPLETED | OUTPATIENT
Start: 2020-01-03 | End: 2020-01-03

## 2020-01-03 RX ORDER — METOPROLOL TARTRATE 50 MG
50 TABLET ORAL ONCE
Refills: 0 | Status: COMPLETED | OUTPATIENT
Start: 2020-01-03 | End: 2020-01-03

## 2020-01-03 RX ORDER — PROTHROMBIN COMPLEX CONCENTRATE (HUMAN) 25.5; 16.5; 24; 22; 22; 26 [IU]/ML; [IU]/ML; [IU]/ML; [IU]/ML; [IU]/ML; [IU]/ML
2500 POWDER, FOR SOLUTION INTRAVENOUS ONCE
Refills: 0 | Status: COMPLETED | OUTPATIENT
Start: 2020-01-03 | End: 2020-01-03

## 2020-01-03 RX ORDER — FAMOTIDINE 10 MG/ML
20 INJECTION INTRAVENOUS ONCE
Refills: 0 | Status: COMPLETED | OUTPATIENT
Start: 2020-01-03 | End: 2020-01-03

## 2020-01-03 RX ORDER — METOPROLOL TARTRATE 50 MG
5 TABLET ORAL ONCE
Refills: 0 | Status: COMPLETED | OUTPATIENT
Start: 2020-01-03 | End: 2020-01-03

## 2020-01-03 RX ORDER — ASPIRIN/CALCIUM CARB/MAGNESIUM 324 MG
162 TABLET ORAL ONCE
Refills: 0 | Status: COMPLETED | OUTPATIENT
Start: 2020-01-03 | End: 2020-01-03

## 2020-01-03 RX ADMIN — Medication 5 MILLIGRAM(S): at 21:25

## 2020-01-03 RX ADMIN — PROTHROMBIN COMPLEX CONCENTRATE (HUMAN) 2500 INTERNATIONAL UNIT(S): 25.5; 16.5; 24; 22; 22; 26 POWDER, FOR SOLUTION INTRAVENOUS at 23:18

## 2020-01-03 RX ADMIN — FAMOTIDINE 20 MILLIGRAM(S): 10 INJECTION INTRAVENOUS at 21:22

## 2020-01-03 RX ADMIN — Medication 50 MILLIGRAM(S): at 21:33

## 2020-01-03 RX ADMIN — PANTOPRAZOLE SODIUM 40 MILLIGRAM(S): 20 TABLET, DELAYED RELEASE ORAL at 23:00

## 2020-01-03 RX ADMIN — PROTHROMBIN COMPLEX CONCENTRATE (HUMAN) 400 INTERNATIONAL UNIT(S): 25.5; 16.5; 24; 22; 22; 26 POWDER, FOR SOLUTION INTRAVENOUS at 23:00

## 2020-01-03 NOTE — ED PROVIDER NOTE - CARE PLAN
Principal Discharge DX:	GI bleed  Secondary Diagnosis:	NSTEMI (non-ST elevated myocardial infarction)

## 2020-01-03 NOTE — ED PROVIDER NOTE - DURATION
1/25/2017    PRIMARY: Joni Rodas MD      ASSESSMENT:    1. Persistent atrial fibrillation    2. Acute CHF, unspecified type    3. Essential hypertension, benign    4. Positive D-dimer    5. Sleep apnea, unspecified        · Persistent afib, rates improved on toprol XL 50 BID, but still 111 at rest  · Edema only minimal improved on increase lasix 40 BID. , CXR mild CHF  · Echo: normal EF, mild RV enlargement, mild LAE  · Tolerating apixaban, affordable  · ddimer elevated, will rule out DVT with persistent edema. Not hupoxic so won't start with PE eval.    PLAN:  · Hold on cardioversion decision until sleep apnea ruled out or treated, and stress test rule out CAD  · In meantime improve rate control by adding cardizem to toprol  · DC lisiniopril to avoid hypotension. Diabetic  · Increase diuresis using metolazone 5 mg for two days  · Labs to chaeck K and creat today and two weeks  · Suspect needs potassium supplement. Will order  · Return 2 weeks with labs    Patient Instructions       Stop Lisinopril  Start Diltiazem 180 mg daily  Start Metolazone 5 mg on this Thursday and Mondays - take 30 minutes prior to furosemide dose  Start Potassium 10 mEq twice daily    Sleep medicine consult  Schedule chemical stress test - NO caffeine 24 hours prior; HOLD metoprolol day of the test  Lab work in 2 weeks  See Dr. Luna in 2 weeks    Cardiac Nuclear Imaging (Nuclear Stress Test)  Cardiac nuclear imaging tests how well the heart functions and if there are any blockages in the heart. The test is also sometimes called a “perfusion scan.” For the scan, a safe amount of radioactive matter called “tracer” is delivered into the bloodstream. A camera then scans the tracer in the blood as it flows through the heart muscle. The tracer leaves your body within hours. This test is done at [facility] on the 2nd floor.  Before Your Test        A scanning camera takes pictures of blood flow through the heart muscle.       · The entire test will take a few hours.   · Your doctor will instruct you if you need to hold any medications.  · Before your test, avoid caffeine for at least 24 hours.  This includes coffee (regular and decaf), tea, soda, and chocolate in any form.   · You may eat a light breakfast (toast/cereal) before the test, or if the procedure is in the afternoon, you may eat a light lunch 4 hours before.  · Please do not use lotions, oils or powders the day of the test.  Deodorant is recommended.  · On the day of the test, dress for comfort. Wear a two-piece outfit, top and bottoms. Be sure to wear walking shoes.  During Your Test  · You may be asked to change into a hospital gown for the test. At some point, scanning pictures will be taken while you rest. This may be done before you exercise. Or, you might have to return for resting scans later that day or the next.  · You will be attached to EKG and blood pressure monitors. An IV (intravenous) line will be started in your arm.  · You will exercise on a treadmill or stationary bike for a few minutes (if able). This increases the rate of blood flow to your heart muscle.  · Speak up when you feel that you cannot exercise for even 1 more minute. At this point, the tracer is given to you through the IV.  · If you cannot exercise, special medications can be used to increase heart rate.  · After you have received the tracer, you will be positioned on the scanning bed.  · You must lie very still for up to 30 minutes. During this time, a scanning camera will be taking pictures. The images will show where blood flows through your heart muscle.  After Your Test  Before going home, ask when you may eat. Also, find out when to resume taking any medications you were told to skip before the test. If you need to return for resting scans, follow any instructions. Most people can go back to their normal routine as soon as all parts of the test are finished.  Let the Technologist  Know:  · What medicines you take.  · If you have diabetes, knee or hip problems, arthritis, asthma, or chronic lung disease.  · If you have had a stroke or have vascular disease of the leg.  · If you are pregnant, think you might be, or are nursing.   Report Any Symptoms  Be sure to tell the doctor if you feel any of the following during the test:  · Chest, arm, or jaw discomfort  · Severe shortness of breath  · Dizziness or lightheadedness  · Leg cramps or pain   © [id number] [facility], [address] [place] [address]. All rights reserved. This information is not intended as a substitute for professional medical care. Always follow your healthcare professional's instructions.          Azul Moralez MA, Elma JOHNSON, Ysabel MARQUES, and I were pleased to meet with you today.     HERE IS SOME IMPORTANT INFORMATION FOR OUR PATIENTS   If you need refills - call your pharmacist and they will contact our office.   If you have medical concerns after hours, and to make appointments, call [phone].    If you have a question during office hours call [phone].  You will eventually speak with my nurses.  If you need to speak to me directly, let them know and I will get back to you as soon as possible.  Better yet, you can consider signing up for [employer], our popular online communication portal to ask for a refill or contact our staff.  Go to:  [email or web address]    Dr. Joe Luna MD          Orders Placed This Encounter   • NM Myocardial Perf Rest of Stress Mult   • Basic Metabolic Panel   • BNP   • Basic Metabolic Panel   • SERVICE TO SLEEP MEDICINE   • Stress test     Return in about 2 weeks (around 2/8/2017) for AF .    Naomy Carmen is a 77 year old female here for evaluation of:  1.  Persistent Atrial Fibrillation  2.  Acute Congestive Heart Failure with Preserved Ejection Fraction  3.  Hypertension  4.  Obesity  5.  Anticoagulation management   A.  [id number] Score 5 (female, age, htn, chf)-  on apixaban    HPI:  Hx htn, morbid obesity and chronic edema.  New afib with rvr discovered after c/o increased weight gain January 2017. Started on eliquis BID and 50 toprol BID.  D-dimer elevated.    SUBJECTIVE:  Pt evaluated last week for new afib.  She admits she is somewhat overwhelmed by her diagnosis.  She does think she's been more dyspneic with her weight gain.  She has some residual pain on her right shoulder.  She is tolerating increased dose of furosemide with polyuria, however her nocturnal diuresis is improved.  Leg swelling she is unsure if it's changed.  Weight down 3#.  HR elevated on toprol 50 BID.    BP low stable, no orthostatic symptoms.  D-dimer elevated- josue CASTILLO scheduled for future date.    Cardiac Diagnostics:  Echocardiogram 1/18/2017:  lvef 60%.  Mildly increased right ventricular size. Mild phtn, RVSP 40 mmHg.  Mildly increased left atrial volume 34.9 ml/m².  Mildly increased right atrial size.  Mild-to-moderate mitral valve regurgitation.  Inferior vena cava not visualized.  The underlying rhythm is atrial fibrillation with an average heart rate of 130 bpm.     ROS:Review of systems as above otherwise negative.    Current Outpatient Prescriptions   Medication Sig   • apixaban (ELIQUIS) 5 MG Tab Take 1 tablet by mouth 2 times daily.   • metoPROLOL (TOPROL-XL) 50 MG 24 hr tablet Take 1 tablet by mouth 2 times daily.   • furosemide (LASIX) 40 MG tablet Take 1 tablet by mouth 2 times daily.   • glipiZIDE (GLIPIZIDE XL) 2.5 MG 24 hr tablet Take 1 tablet by mouth daily.   • atorvastatin (LIPITOR) 20 MG tablet Take 1 tablet by mouth nightly.   • Cholecalciferol (VITAMIN D3) 1000 UNITS capsule Take 1,000 Units by mouth daily.   • Multiple Vitamins-Minerals (CENTRUM SILVER PO) Take 1 tablet by mouth daily.     Current Facility-Administered Medications   Medication   • ONAbotulinumtoxinA (BOTOX) injection 25 Units   • ONAbotulinumtoxinA (BOTOX) injection 25 Units       OBJECTIVE:  I have  reviewed the patient's medications and allergies, problem list, past medical, surgical, social and family history, spending a lot of time updating these as appropriate, especially the problem list.  See Histories section of the EMR for a display of this information.     Patient Active Problem List    Diagnosis Date Noted   • PAF (paroxysmal atrial fibrillation) 01/18/2017     Priority: Low   • Acute congestive heart failure 01/18/2017     Priority: Low   • Senile nuclear sclerosis 02/12/2014     Priority: Low   • PVD (posterior vitreous detachment), both eyes 02/12/2014     Priority: Low   • Ophthalmic migraine 02/12/2014     Priority: Low   • Type II or unspecified type diabetes mellitus with renal manifestations, uncontrolled 09/04/2013     Priority: Low   • Essential hypertension, benign 09/04/2013     Priority: Low   • Chronic kidney disease (CKD) stage 3 09/04/2013     Priority: Low   • Dystonia      Priority: Low   • Hemifacial spasm      Priority: Low       PHYSICAL EXAM:  Vitals:    01/25/17 1114   BP: 102/68   Pulse: 100     BP Readings from Last 4 Encounters:   01/25/17 102/68   01/18/17 128/80   01/18/17 132/80   12/21/16 130/82     Wt Readings from Last 4 Encounters:   01/25/17 (!) 139.7 kg   01/18/17 (!) 141.1 kg   12/21/16 121.6 kg   09/07/16 (!) 126.1 kg     Body mass index is 47.53 kg/(m^2).  General:  No distress   Head and Neck:  Normocephalic-atraumatic. No JVD  Chest:  Symmetrical expansion.  No chest wall deformity  Lungs:  Clear to auscultation bilaterally.  Heart:  Irregularly irregular rhythm.  Skin:  Skin color, texture, turgor normal. No rashes or lesions.  Neurologic: Alert and oriented to person, place and time  Extremities:  edema 3+.    LABS:    Recent Labs  Lab 01/18/17  1435 01/18/17  0936 01/18/17  0928 12/21/16  1253 08/31/16  1226 06/22/16  1225   CHOLESTEROL  --   --   --  171  --  135   HDL  --   --   --  75  --  60   TRIGLYCERIDE  --   --   --  65  --  36   CALCLDL  --   --   --  today  83  --  68   NONHDL  --   --   --  96  --  75   *  --   --   --   --   --    RAPDTR  --  0.02  --   --   --   --    SODIUM  --   --  139 141 142 142   POTASSIUM  --   --  4.8 4.7 4.3 4.2   CHLORIDE  --   --  103 103 105 106   CO2  --   --  30 27 28 26   BUN  --   --  26* 29* 21* 24*   CREATININE  --   --  1.11* 1.08* 0.95 0.98*   GLUCOSE  --   --  127* 138* 125* 132*   WBC  --   --  6.1  --  5.6  --    HGB  --   --  11.0*  --  12.1  --    HCT  --   --  34.7*  --  36.9  --    PLT  --   --  190  --  216  --    TSH  --   --  2.319  --   --   --    HGBA1C  --   --   --  6.2*  --  6.3*       Greater than 50% of the visit was spent was spent counseling this patient on the above diagnosis(es), testing and treatment options.  Total time spent 25 minutes.    I, Ysabel Fermin NP, attest that I scribed the duties personally performed by Dr. Joe Luna.    The documentation recorded by the scribe accurately reflects the services I personally performed and the decisions made by me.  Dr. Joe Luna

## 2020-01-03 NOTE — ED ADULT NURSE REASSESSMENT NOTE - NS ED NURSE REASSESS COMMENT FT1
Patient's daughter reports patient's physicians are as follows: PMD & Cardio - Dr. Obrien, GI - Dr. Maik Ramirez, Heme - Dr. Barrientos.

## 2020-01-03 NOTE — ED ADULT TRIAGE NOTE - CHIEF COMPLAINT QUOTE
Pt presents with chest pain that radiates to the back, shortness of breath, diarrhea, abd pain, weakness, dizziness, Onset began yesterday. hx afib, stent. cardiologist nader

## 2020-01-03 NOTE — ED PROVIDER NOTE - PROGRESS NOTE DETAILS
Adela RÍOS for ED Attending Dr. Chavez: Rectal exam lot 163 exp 08/31/2020 QC passed Adela RÍOS for ED Attending Dr. Chavez: Rectal exam lot 163 exp 08/31/2020 QC passed guaiac positive. Christophe Chavez MD h/h 7, + occult, will give kcentra + blood

## 2020-01-03 NOTE — ED ADULT NURSE NOTE - NSIMPLEMENTINTERV_GEN_ALL_ED
Implemented All Fall with Harm Risk Interventions:  [place] to call system. Call bell, personal items and telephone within reach. Instruct patient to call for assistance. Room bathroom lighting operational. Non-slip footwear when patient is off stretcher. Physically safe environment: no spills, clutter or unnecessary equipment. Stretcher in lowest position, wheels locked, appropriate side rails in place. Provide visual cue, wrist band, yellow gown, etc. Monitor gait and stability. Monitor for mental status changes and reorient to person, place, and time. Review medications for side effects contributing to fall risk. Reinforce activity limits and safety measures with patient and family. Provide visual clues: red socks.

## 2020-01-03 NOTE — ED PROVIDER NOTE - OBJECTIVE STATEMENT
80 y/o female with PMHx of CAD with stents, Afib on Eliquis, loop recorder, HTN, MI Cdiff following hospitalization presents to the ED c/o intermittent CP beginning today. Aggravated with positional change.  +lightheaded, SOB, nausea, decreased appetite, diarrhea x2 episodes x2 days. Reports that she is hypertensive. Denies abd pain. Took 81mg ASA this AM. No recent hospitalization.

## 2020-01-03 NOTE — ED ADULT NURSE NOTE - OBJECTIVE STATEMENT
Pt presents to ER c/o SOB/CP/weakness and intermittent diarrhea. Onset of symptoms began last night. Pt reports stool was dark colored but no noticeable blood. Pt reports intermittent dull ache in back as well. On Eliquis. Hx of blood transfusions. AO x 3 oriented to baseline, normal breathing pattern.

## 2020-01-04 DIAGNOSIS — K92.2 GASTROINTESTINAL HEMORRHAGE, UNSPECIFIED: ICD-10-CM

## 2020-01-04 DIAGNOSIS — I21.4 NON-ST ELEVATION (NSTEMI) MYOCARDIAL INFARCTION: ICD-10-CM

## 2020-01-04 DIAGNOSIS — I48.0 PAROXYSMAL ATRIAL FIBRILLATION: ICD-10-CM

## 2020-01-04 DIAGNOSIS — I25.10 ATHEROSCLEROTIC HEART DISEASE OF NATIVE CORONARY ARTERY WITHOUT ANGINA PECTORIS: ICD-10-CM

## 2020-01-04 LAB
ANION GAP SERPL CALC-SCNC: 5 MMOL/L — SIGNIFICANT CHANGE UP (ref 5–17)
APTT BLD: 30.3 SEC — SIGNIFICANT CHANGE UP (ref 27.5–36.3)
BUN SERPL-MCNC: 19 MG/DL — SIGNIFICANT CHANGE UP (ref 7–23)
CALCIUM SERPL-MCNC: 8.5 MG/DL — SIGNIFICANT CHANGE UP (ref 8.5–10.1)
CHLORIDE SERPL-SCNC: 108 MMOL/L — SIGNIFICANT CHANGE UP (ref 96–108)
CK SERPL-CCNC: 90 U/L — SIGNIFICANT CHANGE UP (ref 26–192)
CK SERPL-CCNC: 95 U/L — SIGNIFICANT CHANGE UP (ref 26–192)
CK SERPL-CCNC: 97 U/L — SIGNIFICANT CHANGE UP (ref 26–192)
CO2 SERPL-SCNC: 30 MMOL/L — SIGNIFICANT CHANGE UP (ref 22–31)
CREAT SERPL-MCNC: 0.68 MG/DL — SIGNIFICANT CHANGE UP (ref 0.5–1.3)
GLUCOSE SERPL-MCNC: 93 MG/DL — SIGNIFICANT CHANGE UP (ref 70–99)
HCT VFR BLD CALC: 24.8 % — LOW (ref 34.5–45)
HCT VFR BLD CALC: 27.8 % — LOW (ref 34.5–45)
HCT VFR BLD CALC: 28 % — LOW (ref 34.5–45)
HGB BLD-MCNC: 8 G/DL — LOW (ref 11.5–15.5)
HGB BLD-MCNC: 9 G/DL — LOW (ref 11.5–15.5)
HGB BLD-MCNC: 9.2 G/DL — LOW (ref 11.5–15.5)
INR BLD: 1.18 RATIO — HIGH (ref 0.88–1.16)
MCHC RBC-ENTMCNC: 29 PG — SIGNIFICANT CHANGE UP (ref 27–34)
MCHC RBC-ENTMCNC: 32.4 GM/DL — SIGNIFICANT CHANGE UP (ref 32–36)
MCV RBC AUTO: 89.7 FL — SIGNIFICANT CHANGE UP (ref 80–100)
PLATELET # BLD AUTO: 285 K/UL — SIGNIFICANT CHANGE UP (ref 150–400)
POTASSIUM SERPL-MCNC: 3.8 MMOL/L — SIGNIFICANT CHANGE UP (ref 3.5–5.3)
POTASSIUM SERPL-SCNC: 3.8 MMOL/L — SIGNIFICANT CHANGE UP (ref 3.5–5.3)
PROTHROM AB SERPL-ACNC: 13.2 SEC — HIGH (ref 10–12.9)
RBC # BLD: 3.1 M/UL — LOW (ref 3.8–5.2)
RBC # FLD: 14.8 % — HIGH (ref 10.3–14.5)
SODIUM SERPL-SCNC: 143 MMOL/L — SIGNIFICANT CHANGE UP (ref 135–145)
TROPONIN I SERPL-MCNC: 1.03 NG/ML — HIGH (ref 0.01–0.04)
TROPONIN I SERPL-MCNC: 1.27 NG/ML — HIGH (ref 0.01–0.04)
TROPONIN I SERPL-MCNC: 1.6 NG/ML — HIGH (ref 0.01–0.04)
WBC # BLD: 8.2 K/UL — SIGNIFICANT CHANGE UP (ref 3.8–10.5)
WBC # FLD AUTO: 8.2 K/UL — SIGNIFICANT CHANGE UP (ref 3.8–10.5)

## 2020-01-04 PROCEDURE — 93010 ELECTROCARDIOGRAM REPORT: CPT

## 2020-01-04 PROCEDURE — 99223 1ST HOSP IP/OBS HIGH 75: CPT

## 2020-01-04 RX ORDER — METOPROLOL TARTRATE 50 MG
75 TABLET ORAL AT BEDTIME
Refills: 0 | Status: DISCONTINUED | OUTPATIENT
Start: 2020-01-04 | End: 2020-01-08

## 2020-01-04 RX ORDER — PANTOPRAZOLE SODIUM 20 MG/1
8 TABLET, DELAYED RELEASE ORAL
Qty: 80 | Refills: 0 | Status: DISCONTINUED | OUTPATIENT
Start: 2020-01-04 | End: 2020-01-05

## 2020-01-04 RX ORDER — ASPIRIN/CALCIUM CARB/MAGNESIUM 324 MG
81 TABLET ORAL DAILY
Refills: 0 | Status: DISCONTINUED | OUTPATIENT
Start: 2020-01-04 | End: 2020-01-08

## 2020-01-04 RX ORDER — ATORVASTATIN CALCIUM 80 MG/1
80 TABLET, FILM COATED ORAL AT BEDTIME
Refills: 0 | Status: DISCONTINUED | OUTPATIENT
Start: 2020-01-04 | End: 2020-01-08

## 2020-01-04 RX ORDER — SOTALOL HCL 120 MG
1 TABLET ORAL
Qty: 0 | Refills: 0 | DISCHARGE

## 2020-01-04 RX ORDER — ACETAMINOPHEN 500 MG
650 TABLET ORAL EVERY 6 HOURS
Refills: 0 | Status: DISCONTINUED | OUTPATIENT
Start: 2020-01-04 | End: 2020-01-08

## 2020-01-04 RX ORDER — AMLODIPINE BESYLATE 2.5 MG/1
5 TABLET ORAL DAILY
Refills: 0 | Status: DISCONTINUED | OUTPATIENT
Start: 2020-01-04 | End: 2020-01-08

## 2020-01-04 RX ADMIN — Medication 81 MILLIGRAM(S): at 17:54

## 2020-01-04 RX ADMIN — AMLODIPINE BESYLATE 5 MILLIGRAM(S): 2.5 TABLET ORAL at 11:30

## 2020-01-04 RX ADMIN — PANTOPRAZOLE SODIUM 10 MG/HR: 20 TABLET, DELAYED RELEASE ORAL at 12:03

## 2020-01-04 RX ADMIN — ATORVASTATIN CALCIUM 80 MILLIGRAM(S): 80 TABLET, FILM COATED ORAL at 22:07

## 2020-01-04 RX ADMIN — Medication 1 TABLET(S): at 11:30

## 2020-01-04 RX ADMIN — Medication 650 MILLIGRAM(S): at 17:14

## 2020-01-04 RX ADMIN — Medication 650 MILLIGRAM(S): at 17:40

## 2020-01-04 RX ADMIN — Medication 75 MILLIGRAM(S): at 17:54

## 2020-01-04 NOTE — H&P ADULT - NSICDXPASTMEDICALHX_GEN_ALL_CORE_FT
PAST MEDICAL HISTORY:  Anemia     Atrial fibrillation     CAD (coronary artery disease)     HTN (hypertension)     Lung cancer     MI (myocardial infarction)     Peptic ulcer disease

## 2020-01-04 NOTE — H&P ADULT - RS GEN PE MLT RESP DETAILS PC
no chest wall tenderness/no rhonchi/no wheezes/airway patent/good air movement/clear to auscultation bilaterally/no intercostal retractions/respirations non-labored/no rales

## 2020-01-04 NOTE — CONSULT NOTE ADULT - ASSESSMENT
Dark stools and UGI bleed suspected  s/p one unit of blood    protonix drip  hold aspirin and eliquis  cardiology consult  advise upper endoscopy with possible biopsies with possible bicap cautery with iv sedation  rationale, all potential risks including but not limited to perforation requiring surgery, aspiration pneumonia or bleeding requiring blood transfusions were discussed along with all benefits and alternatives and the pt wishes to have an upper endoscopy with iv sedation  npo except po meds after mn  continue to monitor hgb carefull   transfuse to keep hgb above 8  clears today

## 2020-01-04 NOTE — ED ADULT NURSE REASSESSMENT NOTE - NS ED NURSE REASSESS COMMENT FT1
Pt resting comfortable in bed. Pt updated on plan of care. Pt reports reduced chest discomfort. Normal breathing pattern with no difficulty

## 2020-01-04 NOTE — CONSULT NOTE ADULT - SUBJECTIVE AND OBJECTIVE BOX
HPI:  80 y/o F PMHx CAD s/p stent x1, Afib on eliquis, HTN, HLD, h/o GI bleed, lung CA s/p resection (no chemo/radiation) presented to ED with shortness of breath  she denied chest pain but her troponin levels are elevated.  she has a fib and hx cad and is maintained on eliquis and aspirin.  her last dose eliquis was on Friday and last dose of aspirin on admission to er  she recalls having dark stools about 2 bm's on each of the past several days. no melena today  no brbpr  she recalls a colonoscopy within 15 months and an egd in 11/2018 with sb capsule study -  she has no heartburn or dysphagia  no early satiety  no n/v  bm's regular   no wt loss    PAST MEDICAL & SURGICAL HISTORY:  Anemia  Lung cancer  Peptic ulcer disease  Atrial fibrillation  MI (myocardial infarction)  CAD (coronary artery disease)  HTN (hypertension)  No significant past surgical history      Allergies    predniSONE (Other (Moderate))    Intolerances        MEDICATIONS  (STANDING):  amLODIPine   Tablet 5 milliGRAM(s) Oral daily  atorvastatin 80 milliGRAM(s) Oral at bedtime  metoprolol succinate ER 75 milliGRAM(s) Oral at bedtime  multivitamin 1 Tablet(s) Oral daily  pantoprazole Infusion 8 mG/Hr (10 mL/Hr) IV Continuous <Continuous>    MEDICATIONS  (PRN):      FAMILY HISTORY:  Family history of intracranial hemorrhage      Social History:  pos tobacco and no etoh    REVIEW OF SYSTEMS      General:	No fever or chills    Skin/Breast: No jaundice or rash   		  ENMT: denies sore throat or thrush    Respiratory and Thorax: Denies dyspnea or cough or shortness of breath  	  Cardiovascular: Denies chest pain or palpitations 	    Gastrointestinal: Denies jaundice or pruritis    Genitourinary: Denies dysuria or hematuria	    Musculoskeletal: Denies muscular pain or swelling	    Neurological: Denies confusion or tremor	    Hematology/Lymphatics:see above 	    Endocrine:	Denies polyphagia or polyuria    See above hx otherwise neg for any major organ systems    PHYSICAL EXAM:    Vital Signs Last 24 Hrs  T(C): 36.7 (04 Jan 2020 07:29), Max: 36.7 (03 Jan 2020 23:40)  T(F): 98.1 (04 Jan 2020 07:29), Max: 98.1 (04 Jan 2020 07:29)  HR: 86 (04 Jan 2020 11:31) (81 - 96)  BP: 153/73 (04 Jan 2020 11:31) (116/80 - 156/69)  BP(mean): 95 (04 Jan 2020 07:27) (95 - 95)  RR: 20 (04 Jan 2020 11:31) (16 - 20)  SpO2: 94% (04 Jan 2020 11:31) (94% - 98%)    Constitutional: no acute distress    ENMT: NC/AT scl anicteric opm pink no lesions     Neck: supple. No jvd or LN    Respiratory: Clear     Cardiovascular: RRR s1s2     Gastrointestinal: Pos bs , soft , not tender, no hepatosplenomegaly,  no mass    Rectal: dark stool, heme pos    Back: No CVA tenderness    Extremities: NO cce     Neurological: Alert and oriented x 3     Skin: No rash or jaundice     Date/Time:01-04 @ 09:34    Albumin: --  ALT/SGPT: --  Alk Phos: --  AST/SGOT: --  Bilirubin Direct: --  Bilirubin Total: --  Ca: 8.5  eGFR : 95  eGFR Non-: 82  Lipase: --  Amylase: --  INR: 1.18  PTT: --  Date/Time:01-03 @ 20:57    Albumin: 3.0  ALT/SGPT: 20  Alk Phos: 66  AST/SGOT: 17  Bilirubin Direct: --  Bilirubin Total: 0.2  Ca: 8.7  eGFR : 79  eGFR Non-: 68  Lipase: --  Amylase: --  INR: 1.56  PTT: --      01-04    143  |  108  |  19  ----------------------------<  93  3.8   |  30  |  0.68    Ca    8.5      04 Jan 2020 09:34    TPro  6.1  /  Alb  3.0<L>  /  TBili  0.2  /  DBili  x   /  AST  17  /  ALT  20  /  AlkPhos  66  01-03                            9.0    8.20  )-----------( 285      ( 04 Jan 2020 09:32 )             27.8

## 2020-01-04 NOTE — CONSULT NOTE ADULT - SUBJECTIVE AND OBJECTIVE BOX
PCP:    REQUESTING PHYSICIAN:    REASON FOR CONSULT:    CHIEF COMPLAINT:    HPI:  78 y/o F PMHx CAD s/p stent x1, Afib on eliquis, HTN, HLD, h/o GI bleed, lung CA s/p resection (no chemo/radiation) presented to ED with complaint of SOB and abdominal pressure. Patient states she was in her usual state of health until about two days ago when she noticed she had been getting a bit SOB on exertion. Patient also stated she had noticed her stools had become darker for the past 2 days as well. Patient complained about abdominal pressure similar to last time she had a GI bleed. Patient alos mentioned she had intermittent chest pain, but that has since resolved.    In ED, patient noted to be anemic- Hgb 7, positive FOBT in ED, was transfused 1 unit PRBC. Also noted to have elevated troponin 0.115- given ASA 162mg x1. Patient also given metoprolol IV and PO, protonix IV x1, and Kcentra. (04 Jan 2020 09:34)  Cardiology called to evaluate symptoms of chest discomfort which pt describes as epigastric. She reports exertional dyspnea lately. Pt reports continued dyspnea after transfusion. Pt denies exertional chest pain.       PAST MEDICAL & SURGICAL HISTORY:  Anemia  Lung cancer  Peptic ulcer disease  Atrial fibrillation  MI (myocardial infarction)  CAD (coronary artery disease)  HTN (hypertension)  No significant past surgical history      Allergies    predniSONE (Other (Moderate))    Intolerances        SOCIAL HISTORY:    FAMILY HISTORY:  Family history of intracranial hemorrhage      MEDICATIONS:  MEDICATIONS  (STANDING):  amLODIPine   Tablet 5 milliGRAM(s) Oral daily  atorvastatin 80 milliGRAM(s) Oral at bedtime  metoprolol succinate ER 75 milliGRAM(s) Oral at bedtime  multivitamin 1 Tablet(s) Oral daily  pantoprazole Infusion 8 mG/Hr (10 mL/Hr) IV Continuous <Continuous>    MEDICATIONS  (PRN):      REVIEW OF SYSTEMS:    CONSTITUTIONAL: No weakness, fevers or chills  EYES/ENT: No visual changes;  No vertigo or throat pain   NECK: No pain or stiffness  RESPIRATORY: Dyspnea  CARDIOVASCULAR: No chest pain or palpitations  GASTROINTESTINAL: Epigastric pain  GENITOURINARY: No dysuria, frequency or hematuria  NEUROLOGICAL: No numbness or weakness  SKIN: No itching, burning, rashes, or lesions   All other review of systems is negative unless indicated above    Vital Signs Last 24 Hrs  T(C): 36.6 (04 Jan 2020 17:00), Max: 36.7 (03 Jan 2020 23:40)  T(F): 97.9 (04 Jan 2020 17:00), Max: 98.1 (04 Jan 2020 07:29)  HR: 99 (04 Jan 2020 17:00) (81 - 99)  BP: 173/71 (04 Jan 2020 17:00) (116/80 - 173/71)  BP(mean): 95 (04 Jan 2020 07:27) (95 - 95)  RR: 18 (04 Jan 2020 17:00) (16 - 20)  SpO2: 96% (04 Jan 2020 17:00) (94% - 98%)    I&O's Summary      PHYSICAL EXAM:    Constitutional: NAD, awake and alert, well-developed  HEENT: PERR, EOMI,  No oral cyananosis.  Neck:  supple,  No JVD  Respiratory: Breath sounds are clear bilaterally, No wheezing, rales or rhonchi  Cardiovascular: S1 and S2, regular rate and rhythm, no Murmurs, gallops or rubs  Gastrointestinal: Bowel Sounds present, soft, nontender.   Extremities: No peripheral edema. No clubbing or cyanosis.  Vascular: 2+ peripheral pulses  Neurological: A/O x 3, no focal deficits  Musculoskeletal: no calf tenderness.  Skin: No rashes.      LABS: All Labs Reviewed:                        9.2    x     )-----------( x        ( 04 Jan 2020 15:36 )             28.0                         9.0    8.20  )-----------( 285      ( 04 Jan 2020 09:32 )             27.8                         7.0    9.38  )-----------( 284      ( 03 Jan 2020 20:57 )             22.1     04 Jan 2020 09:34    143    |  108    |  19     ----------------------------<  93     3.8     |  30     |  0.68   03 Jan 2020 20:57    138    |  106    |  29     ----------------------------<  113    3.7     |  27     |  0.81     Ca    8.5        04 Jan 2020 09:34  Ca    8.7        03 Jan 2020 20:57    TPro  6.1    /  Alb  3.0    /  TBili  0.2    /  DBili  x      /  AST  17     /  ALT  20     /  AlkPhos  66     03 Jan 2020 20:57    PT/INR - ( 04 Jan 2020 09:34 )   PT: 13.2 sec;   INR: 1.18 ratio         PTT - ( 04 Jan 2020 09:34 )  PTT:30.3 sec  CARDIAC MARKERS ( 04 Jan 2020 15:36 )  1.270 ng/mL / x     / 95 U/L / x     / x      CARDIAC MARKERS ( 04 Jan 2020 09:34 )  1.600 ng/mL / x     / 97 U/L / x     / x      CARDIAC MARKERS ( 03 Jan 2020 20:57 )  0.115 ng/mL / x     / x     / x     / x          Blood Culture:   01-03 @ 20:57  Pro Bnp [id number]        RADIOLOGY/EKG: Sinus tachycardia St-T changes anterolaterally similar to 2018.

## 2020-01-04 NOTE — H&P ADULT - HISTORY OF PRESENT ILLNESS
78 y/o F PMHx CAD s/p stent x1, Afib on eliquis, HTN, HLD, h/o GI bleed, lung CA s/p resection (no chemo/radiation) presented to ED with complaint of SOB and abdominal pressure. Patient states she was in her usual state of health until about two days ago when she noticed she had been getting a bit SOB on exertion. Patient also stated she had noticed her stools had become darker for the past 2 days as well. Patient complained about abdominal pressure similar to last time she had a GI bleed. Patient alos mentioned she had intermittent chest pain, but that has since resolved.    In ED, patient noted to be anemic- Hgb 7, positive FOBT in ED, was transfused 1 unit PRBC. Also noted to have elevated troponin 0.115- given ASA 162mg x1. Patient also given metoprolol IV and PO, protonix IV x1, and Kcentra.

## 2020-01-04 NOTE — ED ADULT NURSE REASSESSMENT NOTE - NS ED NURSE REASSESS COMMENT FT1
Report received from Apollo PELAYO. pt A&O x3, resting comfortably in bed. Breathing even and unlabored. Updated on plan of care. Mom at bedside. Report received from Apollo PELAYO. pt A&O x3, resting comfortably in bed. Breathing even and unlabored. Updated on plan of care. Waiting for bed.

## 2020-01-04 NOTE — H&P ADULT - ASSESSMENT
#Anemia likely 2/2 GI bleed  #Elevated troponin  #Afib  #HTN  #HLD    Plan:  -Admit to tele  -F/U AM labs  -s/p 1 unit PRBCs, serial H/H  -Spoke with GI, rec starting protonix drip and starting patient on clears- GI to decide timing of endoscopic intervention  -Hold ASA and eliquis in light of GI bleed- discussed with patient regarding continuing eliquis as this is 3rd time patient has had GI bleed- wants to discuss with Cardio  -Elevated troponin could be 2/2 demand ischemia due to anemia- patient with no current CP  -Rate control for afib  -SCDs for DVT ppx

## 2020-01-05 LAB
ANION GAP SERPL CALC-SCNC: 4 MMOL/L — LOW (ref 5–17)
BASOPHILS # BLD AUTO: 0.06 K/UL — SIGNIFICANT CHANGE UP (ref 0–0.2)
BASOPHILS NFR BLD AUTO: 0.9 % — SIGNIFICANT CHANGE UP (ref 0–2)
BUN SERPL-MCNC: 12 MG/DL — SIGNIFICANT CHANGE UP (ref 7–23)
CALCIUM SERPL-MCNC: 8.4 MG/DL — LOW (ref 8.5–10.1)
CHLORIDE SERPL-SCNC: 104 MMOL/L — SIGNIFICANT CHANGE UP (ref 96–108)
CO2 SERPL-SCNC: 28 MMOL/L — SIGNIFICANT CHANGE UP (ref 22–31)
CREAT SERPL-MCNC: 0.6 MG/DL — SIGNIFICANT CHANGE UP (ref 0.5–1.3)
EOSINOPHIL # BLD AUTO: 0.32 K/UL — SIGNIFICANT CHANGE UP (ref 0–0.5)
EOSINOPHIL NFR BLD AUTO: 4.6 % — SIGNIFICANT CHANGE UP (ref 0–6)
GLUCOSE SERPL-MCNC: 92 MG/DL — SIGNIFICANT CHANGE UP (ref 70–99)
HCT VFR BLD CALC: 26.7 % — LOW (ref 34.5–45)
HCT VFR BLD CALC: 28.6 % — LOW (ref 34.5–45)
HGB BLD-MCNC: 8.6 G/DL — LOW (ref 11.5–15.5)
HGB BLD-MCNC: 9.3 G/DL — LOW (ref 11.5–15.5)
IMM GRANULOCYTES NFR BLD AUTO: 0.3 % — SIGNIFICANT CHANGE UP (ref 0–1.5)
LYMPHOCYTES # BLD AUTO: 1.47 K/UL — SIGNIFICANT CHANGE UP (ref 1–3.3)
LYMPHOCYTES # BLD AUTO: 21.3 % — SIGNIFICANT CHANGE UP (ref 13–44)
MCHC RBC-ENTMCNC: 28.6 PG — SIGNIFICANT CHANGE UP (ref 27–34)
MCHC RBC-ENTMCNC: 32.2 GM/DL — SIGNIFICANT CHANGE UP (ref 32–36)
MCV RBC AUTO: 88.7 FL — SIGNIFICANT CHANGE UP (ref 80–100)
MONOCYTES # BLD AUTO: 1.05 K/UL — HIGH (ref 0–0.9)
MONOCYTES NFR BLD AUTO: 15.2 % — HIGH (ref 2–14)
NEUTROPHILS # BLD AUTO: 3.99 K/UL — SIGNIFICANT CHANGE UP (ref 1.8–7.4)
NEUTROPHILS NFR BLD AUTO: 57.7 % — SIGNIFICANT CHANGE UP (ref 43–77)
OB PNL STL: POSITIVE
PLATELET # BLD AUTO: 278 K/UL — SIGNIFICANT CHANGE UP (ref 150–400)
POTASSIUM SERPL-MCNC: 3.6 MMOL/L — SIGNIFICANT CHANGE UP (ref 3.5–5.3)
POTASSIUM SERPL-SCNC: 3.6 MMOL/L — SIGNIFICANT CHANGE UP (ref 3.5–5.3)
RBC # BLD: 3.01 M/UL — LOW (ref 3.8–5.2)
RBC # FLD: 14.6 % — HIGH (ref 10.3–14.5)
SODIUM SERPL-SCNC: 136 MMOL/L — SIGNIFICANT CHANGE UP (ref 135–145)
WBC # BLD: 6.91 K/UL — SIGNIFICANT CHANGE UP (ref 3.8–10.5)
WBC # FLD AUTO: 6.91 K/UL — SIGNIFICANT CHANGE UP (ref 3.8–10.5)

## 2020-01-05 PROCEDURE — 99233 SBSQ HOSP IP/OBS HIGH 50: CPT

## 2020-01-05 RX ORDER — SOD SULF/SODIUM/NAHCO3/KCL/PEG
4000 SOLUTION, RECONSTITUTED, ORAL ORAL ONCE
Refills: 0 | Status: COMPLETED | OUTPATIENT
Start: 2020-01-05 | End: 2020-01-05

## 2020-01-05 RX ORDER — PANTOPRAZOLE SODIUM 20 MG/1
40 TABLET, DELAYED RELEASE ORAL
Refills: 0 | Status: DISCONTINUED | OUTPATIENT
Start: 2020-01-05 | End: 2020-01-08

## 2020-01-05 RX ORDER — SODIUM CHLORIDE 9 MG/ML
1000 INJECTION INTRAMUSCULAR; INTRAVENOUS; SUBCUTANEOUS
Refills: 0 | Status: DISCONTINUED | OUTPATIENT
Start: 2020-01-05 | End: 2020-01-05

## 2020-01-05 RX ADMIN — AMLODIPINE BESYLATE 5 MILLIGRAM(S): 2.5 TABLET ORAL at 15:24

## 2020-01-05 RX ADMIN — Medication 4000 MILLILITER(S): at 18:06

## 2020-01-05 RX ADMIN — Medication 75 MILLIGRAM(S): at 21:14

## 2020-01-05 RX ADMIN — ATORVASTATIN CALCIUM 80 MILLIGRAM(S): 80 TABLET, FILM COATED ORAL at 21:14

## 2020-01-05 RX ADMIN — Medication 81 MILLIGRAM(S): at 15:24

## 2020-01-05 RX ADMIN — PANTOPRAZOLE SODIUM 40 MILLIGRAM(S): 20 TABLET, DELAYED RELEASE ORAL at 15:24

## 2020-01-05 NOTE — PROGRESS NOTE ADULT - ASSESSMENT
#Anemia likely 2/2 GI bleed  #Elevated troponin  #Afib  #HTN  #HLD    Plan:  -F/U AM labs  -s/p 1 unit PRBCs, serial H/H- stable  -s/p EGD, no source of bleed noted- for colonoscopy in AM  -Hold eliquis in light of GI bleed- discussed with patient regarding continuing eliquis as this is 3rd time patient has had GI bleed- wants to discuss with Cardio  -Elevated troponin could be 2/2 demand ischemia due to anemia- patient with no current CP- chest pain resolved, spoke with cardio- recommending cardiac cath once patient finishes endoscopic workup- restarted ASA 81mg daily  -Rate control for afib- currently in sinus  -SCDs for DVT ppx

## 2020-01-05 NOTE — PROGRESS NOTE ADULT - SUBJECTIVE AND OBJECTIVE BOX
egd performed in or , showed medium sized hiatal hernia but no source of gi bleeding      recommend colonoscopy to be scheduled tomorrow morning  golytely prep today

## 2020-01-05 NOTE — PROGRESS NOTE ADULT - SUBJECTIVE AND OBJECTIVE BOX
INTERVAL HPI/OVERNIGHT EVENTS:  78 y/o F PMHx CAD s/p stent x1, Afib on eliquis, HTN, HLD, h/o GI bleed, lung CA s/p resection (no chemo/radiation) presented to ED with complaint of SOB and abdominal pressure. Patient states she was in her usual state of health until about two days ago when she noticed she had been getting a bit SOB on exertion. Patient also stated she had noticed her stools had become darker for the past 2 days as well. Patient complained about abdominal pressure similar to last time she had a GI bleed. Patient tapan mentioned she had intermittent chest pain, but that has since resolved.    In ED, patient noted to be anemic- Hgb 7, positive FOBT in ED, was transfused 1 unit PRBC. Also noted to have elevated troponin 0.115- given ASA 162mg x1. Patient also given metoprolol IV and PO, protonix IV x1, and Kcentra.     1/5/20- Patient seen and examined at bedside. States she feels well. Had EGD today- no identifiable source of fever identified, scheduled for colonoscopy in AM. Patient states her SOB has improved    MEDICATIONS  (STANDING):  amLODIPine   Tablet 5 milliGRAM(s) Oral daily  aspirin enteric coated 81 milliGRAM(s) Oral daily  atorvastatin 80 milliGRAM(s) Oral at bedtime  metoprolol succinate ER 75 milliGRAM(s) Oral at bedtime  multivitamin 1 Tablet(s) Oral daily  pantoprazole    Tablet 40 milliGRAM(s) Oral before breakfast  polyethylene glycol/electrolyte Solution. 4000 milliLiter(s) Oral once    MEDICATIONS  (PRN):  acetaminophen   Tablet .. 650 milliGRAM(s) Oral every 6 hours PRN Temp greater or equal to 38C (100.4F), Mild Pain (1 - 3)      Allergies    predniSONE (Other (Moderate))    Intolerances      ROS:  CONSTITUTIONAL: No weakness, fevers or chills  EYES/ENT: No visual changes;  No vertigo or throat pain   NECK: No pain or stiffness  RESPIRATORY: No cough, wheezing, hemoptysis; No shortness of breath  CARDIOVASCULAR: No chest pain or palpitations  GASTROINTESTINAL: No abdominal or epigastric pain. No nausea, vomiting, or hematemesis  GENITOURINARY: No dysuria, frequency or hematuria  NEUROLOGICAL: No numbness or weakness  SKIN: No itching, burning, rashes, or lesions   All other review of systems is negative unless indicated above.    Vital Signs Last 24 Hrs  T(C): 37.1 (05 Jan 2020 16:24), Max: 37.1 (05 Jan 2020 16:24)  T(F): 98.8 (05 Jan 2020 16:24), Max: 98.8 (05 Jan 2020 16:24)  HR: 83 (05 Jan 2020 16:24) (74 - 99)  BP: 152/64 (05 Jan 2020 16:24) (91/51 - 173/71)  BP(mean): --  RR: 18 (05 Jan 2020 16:24) (14 - 24)  SpO2: 97% (05 Jan 2020 16:24) (95% - 100%)    01-04 @ 07:01  -  01-05 @ 07:00  --------------------------------------------------------  IN: 68.4 mL / OUT: 200 mL / NET: -131.6 mL    01-05 @ 07:01  -  01-05 @ 16:31  --------------------------------------------------------  IN: 285 mL / OUT: 0 mL / NET: 285 mL      Physical Exam:  General: WN/WD NAD  Neurology: A&Ox3, nonfocal, BLANCA x 4  Respiratory: CTA B/L  CV: RRR, S1S2  Abdominal: Soft, NT, ND +BS  Extremities: No edema, + peripheral pulses      LABS:                        8.6    6.91  )-----------( 278      ( 05 Jan 2020 06:58 )             26.7     01-05    136  |  104  |  12  ----------------------------<  92  3.6   |  28  |  0.60    Ca    8.4<L>      05 Jan 2020 06:58    TPro  6.1  /  Alb  3.0<L>  /  TBili  0.2  /  DBili  x   /  AST  17  /  ALT  20  /  AlkPhos  66  01-03    PT/INR - ( 04 Jan 2020 09:34 )   PT: 13.2 sec;   INR: 1.18 ratio         PTT - ( 04 Jan 2020 09:34 )  PTT:30.3 sec      RADIOLOGY & ADDITIONAL TESTS:

## 2020-01-05 NOTE — PROGRESS NOTE ADULT - PROBLEM SELECTOR PLAN 1
Will need further ischemia evaluation Will need further ischemia evaluation given presentation but does not preclude the need to see source of bleeding GI wise.  Therefor, would proceed with EGD to see if this is soley related to eliquis or from a different source.  Will have CC zofia. depending on result of EGD.

## 2020-01-06 ENCOUNTER — RESULT REVIEW (OUTPATIENT)
Age: 80
End: 2020-01-06

## 2020-01-06 LAB
ANION GAP SERPL CALC-SCNC: 8 MMOL/L — SIGNIFICANT CHANGE UP (ref 5–17)
BUN SERPL-MCNC: 9 MG/DL — SIGNIFICANT CHANGE UP (ref 7–23)
CALCIUM SERPL-MCNC: 8.3 MG/DL — LOW (ref 8.5–10.1)
CHLORIDE SERPL-SCNC: 103 MMOL/L — SIGNIFICANT CHANGE UP (ref 96–108)
CO2 SERPL-SCNC: 29 MMOL/L — SIGNIFICANT CHANGE UP (ref 22–31)
CREAT SERPL-MCNC: 0.63 MG/DL — SIGNIFICANT CHANGE UP (ref 0.5–1.3)
GLUCOSE SERPL-MCNC: 90 MG/DL — SIGNIFICANT CHANGE UP (ref 70–99)
HCT VFR BLD CALC: 27.8 % — LOW (ref 34.5–45)
HGB BLD-MCNC: 9 G/DL — LOW (ref 11.5–15.5)
MCHC RBC-ENTMCNC: 28.5 PG — SIGNIFICANT CHANGE UP (ref 27–34)
MCHC RBC-ENTMCNC: 32.4 GM/DL — SIGNIFICANT CHANGE UP (ref 32–36)
MCV RBC AUTO: 88 FL — SIGNIFICANT CHANGE UP (ref 80–100)
PLATELET # BLD AUTO: 289 K/UL — SIGNIFICANT CHANGE UP (ref 150–400)
POTASSIUM SERPL-MCNC: 3.1 MMOL/L — LOW (ref 3.5–5.3)
POTASSIUM SERPL-SCNC: 3.1 MMOL/L — LOW (ref 3.5–5.3)
RBC # BLD: 3.16 M/UL — LOW (ref 3.8–5.2)
RBC # FLD: 14.2 % — SIGNIFICANT CHANGE UP (ref 10.3–14.5)
SODIUM SERPL-SCNC: 140 MMOL/L — SIGNIFICANT CHANGE UP (ref 135–145)
WBC # BLD: 8.5 K/UL — SIGNIFICANT CHANGE UP (ref 3.8–10.5)
WBC # FLD AUTO: 8.5 K/UL — SIGNIFICANT CHANGE UP (ref 3.8–10.5)

## 2020-01-06 PROCEDURE — 99233 SBSQ HOSP IP/OBS HIGH 50: CPT

## 2020-01-06 PROCEDURE — 93306 TTE W/DOPPLER COMPLETE: CPT | Mod: 26

## 2020-01-06 PROCEDURE — 93010 ELECTROCARDIOGRAM REPORT: CPT

## 2020-01-06 PROCEDURE — 88305 TISSUE EXAM BY PATHOLOGIST: CPT | Mod: 26

## 2020-01-06 PROCEDURE — 88342 IMHCHEM/IMCYTCHM 1ST ANTB: CPT | Mod: 26

## 2020-01-06 RX ORDER — FERROUS SULFATE 325(65) MG
325 TABLET ORAL
Refills: 0 | Status: DISCONTINUED | OUTPATIENT
Start: 2020-01-06 | End: 2020-01-08

## 2020-01-06 RX ORDER — POTASSIUM CHLORIDE 20 MEQ
40 PACKET (EA) ORAL EVERY 4 HOURS
Refills: 0 | Status: COMPLETED | OUTPATIENT
Start: 2020-01-06 | End: 2020-01-06

## 2020-01-06 RX ADMIN — Medication 40 MILLIEQUIVALENT(S): at 13:06

## 2020-01-06 RX ADMIN — Medication 325 MILLIGRAM(S): at 17:56

## 2020-01-06 RX ADMIN — AMLODIPINE BESYLATE 5 MILLIGRAM(S): 2.5 TABLET ORAL at 05:19

## 2020-01-06 RX ADMIN — Medication 40 MILLIEQUIVALENT(S): at 17:56

## 2020-01-06 RX ADMIN — PANTOPRAZOLE SODIUM 40 MILLIGRAM(S): 20 TABLET, DELAYED RELEASE ORAL at 05:19

## 2020-01-06 RX ADMIN — ATORVASTATIN CALCIUM 80 MILLIGRAM(S): 80 TABLET, FILM COATED ORAL at 21:18

## 2020-01-06 RX ADMIN — Medication 81 MILLIGRAM(S): at 13:05

## 2020-01-06 RX ADMIN — Medication 75 MILLIGRAM(S): at 18:01

## 2020-01-06 RX ADMIN — Medication 1 TABLET(S): at 13:05

## 2020-01-06 NOTE — PROGRESS NOTE ADULT - PROBLEM SELECTOR PLAN 1
Needs further ischemia evaluation given presentation EGD Negative.  Colonoscopy today. Will have CC zofia. depending on result of colonoscopy.

## 2020-01-06 NOTE — PROGRESS NOTE ADULT - ASSESSMENT
#Anemia likely 2/2 GI bleed  #Elevated troponin  #Afib  #HTN  #HLD    Plan:  -F/U AM labs  -s/p 1 unit PRBCs, serial H/H- stable  -s/p EGD, no source of bleed noted. s/p colonoscopy- 2 polyps removed- GI recommending outpatient capsule study  -Hold eliquis in light of GI bleed- discussed with patient regarding continuing eliquis as this is 3rd time patient has had GI bleed- wants to discuss with Cardio  -Elevated troponin could be 2/2 demand ischemia due to anemia- patient with no current CP- chest pain resolved, spoke with cardio- recommending cardiac cath once patient finishes endoscopic workup- restarted ASA 81mg daily- Cardiac cath in AM- NPO after midnight  -Rate control for afib- currently in sinus  -SCDs for DVT ppx

## 2020-01-06 NOTE — PROGRESS NOTE ADULT - SUBJECTIVE AND OBJECTIVE BOX
HPI:  80 y/o F PMHx CAD(Inferior MI 2008 (SOCRATES to RCA)), Afib on eliquis, HTN, HLD, PAD with intermittent claudication (Occluded Left iliac), h/o GI bleed (on AC), lung CA s/p resection (no chemo/radiation) presented to ED with complaint of SOB and abdominal pressure. Patient states she was in her usual state of health until about two days ago when she noticed she had been getting a bit SOB on exertion. Patient also stated she had noticed her stools had become darker for the past 2 days as well. Patient complained about abdominal pressure similar to last time she had a GI bleed. Patient alos mentioned she had intermittent chest pain, but that has since resolved. In ED, patient noted to be anemic- Hgb 7, positive FOBT in ED, was transfused 1 unit PRBC. Also noted to have elevated troponin 0.115- given ASA 162mg x1. Patient also given metoprolol IV and PO, protonix IV x1, and Kcentra. (04 Jan 2020 09:34) Cardiology called to evaluate symptoms of chest discomfort which pt describes as epigastric. She reports exertional dyspnea lately. Pt reports continued dyspnea after transfusion. Pt denies exertional chest pain.     1/5/20: Improved today after transfusion.  Agree with plan to have EGD today.  Discussed due to elevation of tropinin c/w NSTEMI in setting of HGB drop which has not happened previously and the symptoms she has had which seem to be brewing for a while that further ischemia evaluation is warranted.     1/6/20: Had Endoscopy yesterday and negative. plan for colonoscopy today.  Cath on tuesday once results known.      PAST MEDICAL & SURGICAL HISTORY:  Anemia  Lung cancer  Peptic ulcer disease  Atrial fibrillation  MI (myocardial infarction)  CAD (coronary artery disease)  HTN (hypertension)  PAD  Lung Surgery  SOCRATES placement    Allergies  predniSONE (Other (Moderate))    SOCIAL HISTORY: Former smoker/Social etoh    FAMILY HISTORY:  Family history of intracranial hemorrhage      PHYSICAL EXAM:  Constitutional: NAD, awake and alert, well-developed  HEENT: PERR, EOMI,  No oral cyananosis.  Neck:  supple,  No JVD  Respiratory: Breath sounds are clear bilaterally, No wheezing, rales or rhonchi  Cardiovascular: S1 and S2, regular rate and rhythm, +s4, 1/6 KOLTON to LLSB.  Gastrointestinal: Bowel Sounds present, soft.   Extremities: No peripheral edema. No clubbing or cyanosis.  Vascular: 1+ peripheral pulses  Neurological: A/O x 3, no focal deficits  Musculoskeletal: no calf tenderness.  Skin: No rashes.    LABS: All Labs Reviewed:                        9.0    8.50  )-----------( 289      ( 06 Jan 2020 07:10 )             27.8     01-06    140  |  103  |  9   ----------------------------<  90  3.1<L>   |  29  |  0.63    Ca    8.3<L>      06 Jan 2020 07:10      CARDIAC MARKERS ( 04 Jan 2020 22:57 )  1.030 ng/mL / x     / 90 U/L / x     / x      CARDIAC MARKERS ( 04 Jan 2020 15:36 )  1.270 ng/mL / x     / 95 U/L / x     / x        EKG: Sinus tachycardia St-T changes anterolaterally similar to 2018.     < from: Transthoracic Echocardiogram (07.08.17 @ 09:43) >   Left ventricular wall motion is normal.   Estimated left ventricular ejection fraction is 60 %.   Normal appearing left atrium.   Normal appearing right atrium.   Normal appearing right ventricle structure and function.   Mild fibrocalcific changes noted to the aortic valve leaflets with   preserved excursion.   Mild (1+) mitral regurgitation is present.   Mild mitral annular calcification is present.   Fibrocalcific changes noted to the mitral valve leaflets with preserved   leaflet excursion.   EA reversal of the mitral inflow consistent with reduced compliance of   the   left ventricle   Trace tricuspid valve regurgitation is present.   No evidence of pericardial effusion.    < end of copied text >    Cath 2008: 1 vessel CAD (STEMI) PCI of RCA.  EF 50%

## 2020-01-06 NOTE — PROGRESS NOTE ADULT - SUBJECTIVE AND OBJECTIVE BOX
INTERVAL HPI/OVERNIGHT EVENTS:   80 y/o F PMHx CAD s/p stent x1, Afib on eliquis, HTN, HLD, h/o GI bleed, lung CA s/p resection (no chemo/radiation) presented to ED with complaint of SOB and abdominal pressure. Patient states she was in her usual state of health until about two days ago when she noticed she had been getting a bit SOB on exertion. Patient also stated she had noticed her stools had become darker for the past 2 days as well. Patient complained about abdominal pressure similar to last time she had a GI bleed. Patient tapan mentioned she had intermittent chest pain, but that has since resolved.    In ED, patient noted to be anemic- Hgb 7, positive FOBT in ED, was transfused 1 unit PRBC. Also noted to have elevated troponin 0.115- given ASA 162mg x1. Patient also given metoprolol IV and PO, protonix IV x1, and Kcentra.     1/5/20- Patient seen and examined at bedside. States she feels well. Had EGD today- no identifiable source of fever identified, scheduled for colonoscopy in AM. Patient states her SOB has improved  1/6/20- Patient seen and examined at bedside. States she feels well. Had colonoscopy today- 2 polyps removed. GI recommending outpatient capsule study. Patient scheduled for cardiac cath in AM.    MEDICATIONS  (STANDING):  amLODIPine   Tablet 5 milliGRAM(s) Oral daily  aspirin enteric coated 81 milliGRAM(s) Oral daily  atorvastatin 80 milliGRAM(s) Oral at bedtime  metoprolol succinate ER 75 milliGRAM(s) Oral at bedtime  multivitamin 1 Tablet(s) Oral daily  pantoprazole    Tablet 40 milliGRAM(s) Oral before breakfast  potassium chloride    Tablet ER 40 milliEquivalent(s) Oral every 4 hours    MEDICATIONS  (PRN):  acetaminophen   Tablet .. 650 milliGRAM(s) Oral every 6 hours PRN Temp greater or equal to 38C (100.4F), Mild Pain (1 - 3)      Allergies    predniSONE (Other (Moderate))    Intolerances      ROS:  CONSTITUTIONAL: No weakness, fevers or chills  EYES/ENT: No visual changes;  No vertigo or throat pain   NECK: No pain or stiffness  RESPIRATORY: No cough, wheezing, hemoptysis; No shortness of breath  CARDIOVASCULAR: No chest pain or palpitations  GASTROINTESTINAL: No abdominal or epigastric pain. No nausea, vomiting, or hematemesis  GENITOURINARY: No dysuria, frequency or hematuria  NEUROLOGICAL: No numbness or weakness  SKIN: No itching, burning, rashes, or lesions   All other review of systems is negative unless indicated above.    Vital Signs Last 24 Hrs  T(C): 36.9 (06 Jan 2020 17:20), Max: 36.9 (06 Jan 2020 17:20)  T(F): 98.5 (06 Jan 2020 17:20), Max: 98.5 (06 Jan 2020 17:20)  HR: 90 (06 Jan 2020 17:20) (83 - 90)  BP: 127/70 (06 Jan 2020 17:20) (111/77 - 165/65)  BP(mean): --  RR: 18 (06 Jan 2020 11:17) (18 - 199)  SpO2: 98% (06 Jan 2020 17:20) (97% - 99%)    01-05 @ 07:01  -  01-06 @ 07:00  --------------------------------------------------------  IN: 285 mL / OUT: 0 mL / NET: 285 mL      Physical Exam:  General: WN/WD NAD  Neurology: A&Ox3, nonfocal, BLANCA x 4  Respiratory: CTA B/L  CV: RRR, S1S2  Abdominal: Soft, NT, ND +BS  Extremities: No edema, + peripheral pulses      LABS:                        9.0    8.50  )-----------( 289      ( 06 Jan 2020 07:10 )             27.8     01-06    140  |  103  |  9   ----------------------------<  90  3.1<L>   |  29  |  0.63    Ca    8.3<L>      06 Jan 2020 07:10            RADIOLOGY & ADDITIONAL TESTS:

## 2020-01-07 LAB
ALBUMIN SERPL ELPH-MCNC: 3.1 G/DL — LOW (ref 3.3–5)
ALP SERPL-CCNC: 104 U/L — SIGNIFICANT CHANGE UP (ref 40–120)
ALT FLD-CCNC: 23 U/L — SIGNIFICANT CHANGE UP (ref 12–78)
ANION GAP SERPL CALC-SCNC: 6 MMOL/L — SIGNIFICANT CHANGE UP (ref 5–17)
ANION GAP SERPL CALC-SCNC: 6 MMOL/L — SIGNIFICANT CHANGE UP (ref 5–17)
AST SERPL-CCNC: 17 U/L — SIGNIFICANT CHANGE UP (ref 15–37)
BILIRUB SERPL-MCNC: 0.3 MG/DL — SIGNIFICANT CHANGE UP (ref 0.2–1.2)
BUN SERPL-MCNC: 14 MG/DL — SIGNIFICANT CHANGE UP (ref 7–23)
BUN SERPL-MCNC: 18 MG/DL — SIGNIFICANT CHANGE UP (ref 7–23)
CALCIUM SERPL-MCNC: 8.4 MG/DL — LOW (ref 8.5–10.1)
CALCIUM SERPL-MCNC: 8.7 MG/DL — SIGNIFICANT CHANGE UP (ref 8.5–10.1)
CHLORIDE SERPL-SCNC: 104 MMOL/L — SIGNIFICANT CHANGE UP (ref 96–108)
CHLORIDE SERPL-SCNC: 105 MMOL/L — SIGNIFICANT CHANGE UP (ref 96–108)
CO2 SERPL-SCNC: 26 MMOL/L — SIGNIFICANT CHANGE UP (ref 22–31)
CO2 SERPL-SCNC: 27 MMOL/L — SIGNIFICANT CHANGE UP (ref 22–31)
CREAT SERPL-MCNC: 0.84 MG/DL — SIGNIFICANT CHANGE UP (ref 0.5–1.3)
CREAT SERPL-MCNC: 0.96 MG/DL — SIGNIFICANT CHANGE UP (ref 0.5–1.3)
GLUCOSE SERPL-MCNC: 101 MG/DL — HIGH (ref 70–99)
GLUCOSE SERPL-MCNC: 120 MG/DL — HIGH (ref 70–99)
HCT VFR BLD CALC: 27.6 % — LOW (ref 34.5–45)
HCT VFR BLD CALC: 28.4 % — LOW (ref 34.5–45)
HGB BLD-MCNC: 8.9 G/DL — LOW (ref 11.5–15.5)
HGB BLD-MCNC: 9 G/DL — LOW (ref 11.5–15.5)
MAGNESIUM SERPL-MCNC: 1.8 MG/DL — SIGNIFICANT CHANGE UP (ref 1.6–2.6)
MAGNESIUM SERPL-MCNC: 1.9 MG/DL — SIGNIFICANT CHANGE UP (ref 1.6–2.6)
MCHC RBC-ENTMCNC: 28.7 PG — SIGNIFICANT CHANGE UP (ref 27–34)
MCHC RBC-ENTMCNC: 28.8 PG — SIGNIFICANT CHANGE UP (ref 27–34)
MCHC RBC-ENTMCNC: 31.7 GM/DL — LOW (ref 32–36)
MCHC RBC-ENTMCNC: 32.2 GM/DL — SIGNIFICANT CHANGE UP (ref 32–36)
MCV RBC AUTO: 89 FL — SIGNIFICANT CHANGE UP (ref 80–100)
MCV RBC AUTO: 90.7 FL — SIGNIFICANT CHANGE UP (ref 80–100)
PHOSPHATE SERPL-MCNC: 3.2 MG/DL — SIGNIFICANT CHANGE UP (ref 2.5–4.5)
PLATELET # BLD AUTO: 301 K/UL — SIGNIFICANT CHANGE UP (ref 150–400)
PLATELET # BLD AUTO: 318 K/UL — SIGNIFICANT CHANGE UP (ref 150–400)
POTASSIUM SERPL-MCNC: 3.5 MMOL/L — SIGNIFICANT CHANGE UP (ref 3.5–5.3)
POTASSIUM SERPL-MCNC: 4 MMOL/L — SIGNIFICANT CHANGE UP (ref 3.5–5.3)
POTASSIUM SERPL-SCNC: 3.5 MMOL/L — SIGNIFICANT CHANGE UP (ref 3.5–5.3)
POTASSIUM SERPL-SCNC: 4 MMOL/L — SIGNIFICANT CHANGE UP (ref 3.5–5.3)
PROT SERPL-MCNC: 6.5 GM/DL — SIGNIFICANT CHANGE UP (ref 6–8.3)
RBC # BLD: 3.1 M/UL — LOW (ref 3.8–5.2)
RBC # BLD: 3.13 M/UL — LOW (ref 3.8–5.2)
RBC # FLD: 14.5 % — SIGNIFICANT CHANGE UP (ref 10.3–14.5)
RBC # FLD: 14.6 % — HIGH (ref 10.3–14.5)
SODIUM SERPL-SCNC: 136 MMOL/L — SIGNIFICANT CHANGE UP (ref 135–145)
SODIUM SERPL-SCNC: 138 MMOL/L — SIGNIFICANT CHANGE UP (ref 135–145)
WBC # BLD: 7.6 K/UL — SIGNIFICANT CHANGE UP (ref 3.8–10.5)
WBC # BLD: 8.71 K/UL — SIGNIFICANT CHANGE UP (ref 3.8–10.5)
WBC # FLD AUTO: 7.6 K/UL — SIGNIFICANT CHANGE UP (ref 3.8–10.5)
WBC # FLD AUTO: 8.71 K/UL — SIGNIFICANT CHANGE UP (ref 3.8–10.5)

## 2020-01-07 PROCEDURE — 99152 MOD SED SAME PHYS/QHP 5/>YRS: CPT

## 2020-01-07 PROCEDURE — 93458 L HRT ARTERY/VENTRICLE ANGIO: CPT | Mod: 26

## 2020-01-07 PROCEDURE — 99233 SBSQ HOSP IP/OBS HIGH 50: CPT | Mod: 25

## 2020-01-07 PROCEDURE — 93567 NJX CAR CTH SPRVLV AORTGRPHY: CPT

## 2020-01-07 PROCEDURE — 93010 ELECTROCARDIOGRAM REPORT: CPT

## 2020-01-07 RX ORDER — METOPROLOL TARTRATE 50 MG
5 TABLET ORAL ONCE
Refills: 0 | Status: COMPLETED | OUTPATIENT
Start: 2020-01-07 | End: 2020-01-07

## 2020-01-07 RX ORDER — CLOPIDOGREL BISULFATE 75 MG/1
75 TABLET, FILM COATED ORAL DAILY
Refills: 0 | Status: DISCONTINUED | OUTPATIENT
Start: 2020-01-07 | End: 2020-01-08

## 2020-01-07 RX ORDER — SODIUM CHLORIDE 9 MG/ML
1000 INJECTION INTRAMUSCULAR; INTRAVENOUS; SUBCUTANEOUS
Refills: 0 | Status: DISCONTINUED | OUTPATIENT
Start: 2020-01-07 | End: 2020-01-08

## 2020-01-07 RX ADMIN — Medication 1 TABLET(S): at 15:49

## 2020-01-07 RX ADMIN — Medication 650 MILLIGRAM(S): at 15:47

## 2020-01-07 RX ADMIN — Medication 650 MILLIGRAM(S): at 23:30

## 2020-01-07 RX ADMIN — Medication 5 MILLIGRAM(S): at 22:15

## 2020-01-07 RX ADMIN — Medication 81 MILLIGRAM(S): at 05:01

## 2020-01-07 RX ADMIN — Medication 650 MILLIGRAM(S): at 22:57

## 2020-01-07 RX ADMIN — ATORVASTATIN CALCIUM 80 MILLIGRAM(S): 80 TABLET, FILM COATED ORAL at 21:32

## 2020-01-07 RX ADMIN — CLOPIDOGREL BISULFATE 75 MILLIGRAM(S): 75 TABLET, FILM COATED ORAL at 15:49

## 2020-01-07 RX ADMIN — Medication 650 MILLIGRAM(S): at 16:17

## 2020-01-07 RX ADMIN — SODIUM CHLORIDE 75 MILLILITER(S): 9 INJECTION INTRAMUSCULAR; INTRAVENOUS; SUBCUTANEOUS at 23:23

## 2020-01-07 RX ADMIN — Medication 75 MILLIGRAM(S): at 21:32

## 2020-01-07 RX ADMIN — Medication 325 MILLIGRAM(S): at 15:50

## 2020-01-07 NOTE — PROGRESS NOTE ADULT - ASSESSMENT
#Anemia likely 2/2 GI bleed  #Elevated troponin  #Afib  #HTN  #HLD    Plan:  -F/U AM labs  -s/p 1 unit PRBCs, serial H/H- stable  -s/p EGD, no source of bleed noted. s/p colonoscopy- 2 polyps removed- GI recommending outpatient capsule study  -Hold eliquis in light of GI bleed- discussed with patient regarding continuing eliquis as this is 3rd time patient has had GI bleed- wants to discuss with Cardio- stop eliquis  -Elevated troponin could be 2/2 demand ischemia due to anemia- patient with no current CP- chest pain resolved, spoke with cardio- recommending cardiac cath once patient finishes endoscopic workup- restarted ASA 81mg daily- Cardiac cath today showing triple vessel disease with restenosis of stented artery- continue ASA and add plavix. Patient to be discharged in AM with follow up with Cardio for PCI  -Rate control for afib- currently in sinus  -SCDs for DVT ppx

## 2020-01-07 NOTE — PACU DISCHARGE NOTE - COMMENTS
patient discharged to 3 east. Right Radial site benign, soft nontender, no hematoma. dressing clean/dry/intact. IVL site benign. Patient without any complaints. Vital signs stable. Post procedure instructions given and understood by patient via teach back. Will continue to monitor patient status. Report given to Karyn PELAYO. pt placed on tele.

## 2020-01-07 NOTE — PROVIDER CONTACT NOTE (CHANGE IN STATUS NOTIFICATION) - BACKGROUND
Patient came here for abd.pain and chest pain. patient got the CATH today and they found 3 vessels. no intervention was made.

## 2020-01-07 NOTE — PROGRESS NOTE ADULT - SUBJECTIVE AND OBJECTIVE BOX
HPI:  78 y/o F PMHx CAD(Inferior MI  (SOCRATES to RCA)), Afib on eliquis, HTN, HLD, PAD with intermittent claudication (Occluded Left iliac), h/o GI bleed (on AC), lung CA s/p resection (no chemo/radiation) presented to ED with complaint of SOB and abdominal pressure. Patient states she was in her usual state of health until about two days ago when she noticed she had been getting a bit SOB on exertion. Patient also stated she had noticed her stools had become darker for the past 2 days as well. Patient complained about abdominal pressure similar to last time she had a GI bleed. Patient alos mentioned she had intermittent chest pain, but that has since resolved. In ED, patient noted to be anemic- Hgb 7, positive FOBT in ED, was transfused 1 unit PRBC. Also noted to have elevated troponin 0.115- given ASA 162mg x1. Patient also given metoprolol IV and PO, protonix IV x1, and Kcentra. (2020 09:34) Cardiology called to evaluate symptoms of chest discomfort which pt describes as epigastric. She reports exertional dyspnea lately. Pt reports continued dyspnea after transfusion. Pt denies exertional chest pain.     20: Improved today after transfusion.  Agree with plan to have EGD today.  Discussed due to elevation of tropinin c/w NSTEMI in setting of HGB drop which has not happened previously and the symptoms she has had which seem to be brewing for a while that further ischemia evaluation is warranted.     20: Had Endoscopy yesterday and negative. plan for colonoscopy today.  Cath on tuesday once results known.      20: s/p cath and shows 3 Vessel CAD.  Discussed options for treatment.  wants to think about it but is leaning towards PCI.      PAST MEDICAL & SURGICAL HISTORY:  Anemia  Lung cancer  Peptic ulcer disease  Atrial fibrillation  MI (myocardial infarction)  CAD (coronary artery disease)  HTN (hypertension)  PAD  Lung Surgery  SOCRATES placement    Allergies  predniSONE (Other (Moderate))    SOCIAL HISTORY: Former smoker/Social etoh    FAMILY HISTORY:  Family history of intracranial hemorrhage    MEDICATIONS  (STANDING):  amLODIPine   Tablet 5 milliGRAM(s) Oral daily  aspirin enteric coated 81 milliGRAM(s) Oral daily  atorvastatin 80 milliGRAM(s) Oral at bedtime  clopidogrel Tablet 75 milliGRAM(s) Oral daily  ferrous    sulfate 325 milliGRAM(s) Oral two times a day  metoprolol succinate ER 75 milliGRAM(s) Oral at bedtime  multivitamin 1 Tablet(s) Oral daily  pantoprazole    Tablet 40 milliGRAM(s) Oral before breakfast    MEDICATIONS  (PRN):  acetaminophen   Tablet .. 650 milliGRAM(s) Oral every 6 hours PRN Temp greater or equal to 38C (100.4F), Mild Pain (1 - 3)      Vital Signs Last 24 Hrs  T(C): 36.9 (2020 08:56), Max: 36.9 (2020 17:20)  T(F): 98.5 (2020 08:56), Max: 98.5 (2020 17:20)  HR: 93 (2020 13:25) (68 - 93)  BP: 127/57 (2020 13:25) (106/68 - 158/61)  BP(mean): 68 (2020 04:36) (68 - 68)  RR: 16 (2020 13:25) (16 - 18)  SpO2: 98% (2020 13:10) (95% - 98%)    I&O's Detail    2020 07:01  -  2020 13:38  --------------------------------------------------------  IN:  Total IN: 0 mL    OUT:    Voided: 300 mL  Total OUT: 300 mL    Total NET: -300 mL          Daily Height in cm: 157.48 (2020 08:56)    Daily Weight in k.4 (2020 11:14)      PHYSICAL EXAM:  Constitutional: NAD, awake and alert, well-developed  HEENT: PERR, EOMI,  No oral cyananosis.  Neck:  supple,  No JVD  Respiratory: Breath sounds are clear bilaterally, No wheezing, rales or rhonchi  Cardiovascular: S1 and S2, regular rate and rhythm, +s4, 1/6 KOLTON to LLSB.  Gastrointestinal: Bowel Sounds present, soft.   Extremities: No peripheral edema. No clubbing or cyanosis.  Vascular: 1+ peripheral pulses  Neurological: A/O x 3, no focal deficits  Musculoskeletal: no calf tenderness.  Skin: No rashes.    LABS: All Labs Reviewed:                          9.0    7.60  )-----------( 318      ( 2020 07:22 )             28.4         138  |  105  |  14  ----------------------------<  101<H>  4.0   |  27  |  0.84    Ca    8.4<L>      2020 07:22  Mg     1.9         EKG: Sinus tachycardia St-T changes anterolaterally similar to 2018.     < from: Transthoracic Echocardiogram (17 @ 09:43) >   Left ventricular wall motion is normal.   Estimated left ventricular ejection fraction is 60 %.   Normal appearing left atrium.   Normal appearing right atrium.   Normal appearing right ventricle structure and function.   Mild fibrocalcific changes noted to the aortic valve leaflets with   preserved excursion.   Mild (1+) mitral regurgitation is present.   Mild mitral annular calcification is present.   Fibrocalcific changes noted to the mitral valve leaflets with preserved   leaflet excursion.   EA reversal of the mitral inflow consistent with reduced compliance of   the   left ventricle   Trace tricuspid valve regurgitation is present.   No evidence of pericardial effusion.    < end of copied text >    Cath 2008: 1 vessel CAD (STEMI) PCI of RCA.  EF 50%    < from: Cardiac Cath Lab - Adult (20 @ 11:15) >   Angiographic Findings     Cardiac Arteries and Lesion Findings    LMCA: Diffuse irregularity.There is mild diffuse disease noted.    LAD: Diffuse irregularity.     Prox LAD: Distal subsection.70% stenosis 12 mm length.Pre procedure LONNY   III flow was noted. Good runoff was present.The lesion was diagnosed as a   moderate risk lesion.    LCx: Diffuse irregularity.     Mid CX: Mid subsection.85% stenosis 16 mm length.Pre procedure LONNY III   flow was noted. Good runoff was present.The lesion was diagnosed as a   moderate risk lesion.    RCA: Diffuse irregularity.     ProxRCA: Ostial.100% stenosis 30 mm length.Pre procedure LONNY 0 flow was   noted. The lesion was diagnosed as a high risk lesion.     The lesion was previously treated with the following devices: drug eluting   stent.    Ramus: Diffuse irregularity.    Cardiac Collaterals  +-----------------------------------------+------------+---------+---------+  !Origin                                   !Destination !Flow     !Comments !  +-----------------------------------------+------------+---------+---------+  !1st Septal                               !R PDA       !Moderate !         !  +-----------------------------------------+------------+---------+---------+  !Dist CX                                  !1st Ac Selene !Moderate !         !  +-----------------------------------------+------------+---------+---------+    Valves  +------+----------+--------------+-----------------------------------------+  !Valve !Stenosis  !Insufficiency !Comments                                 !  +------+----------+--------------+-----------------------------------------+  !Aortic!No        !Grade 1       !Aorta has significant calcification and  !  !      !          !              !moderate atheroma.                       !  +------+----------+--------------+-----------------------------------------+  !Mitral!Not       !No            !                                         !  !      !assessed  !insufficiency !                                         !  +------+----------+--------------+-----------------------------------------+    LV function assessed as:Abnormal.  Ejection Fraction  +----------------------------------------------------------------------+---+  !Method                                                                !EF%!  +----------------------------------------------------------------------+---+  !LV gram                                                               !50 !  +----------------------------------------------------------------------+---+    [facility]  Ventriculography Findings:  Low normal left ventricular systolic function.     Impression     Diagnostic Conclusions     3 Vessel CAD with low NL LV FX     Recommendations     Discussion regarding revascualrization    < end of copied text >

## 2020-01-07 NOTE — PROGRESS NOTE ADULT - NSHPATTENDINGPLANDISCUSS_GEN_ALL_CORE
patient, RN, Cardio, GI
patient, RN, Cardio, daughter
patient, RN, family, Cardio
pt., Dr. Tang and staff
pt., Family., Dr. Tang and staff
pt., Dr. Barrientos and staff

## 2020-01-07 NOTE — CHART NOTE - NSCHARTNOTEFT_GEN_A_CORE
-Consent obtained for cardiac catheterization w/ coronary angiogram and possible stent placement. Pt is competent, has capacity, and understands risks and benefits of procedure. Risks and benefits discussed. Risk discussed included, but not limited to MI, stroke, mortality, major bleeding, arrythmia, or infection. All questions answered     ASA class: III  Creatinine: 0.63  GFR: 84  Bleeding  Risk score: 7.1%    PHYSICAL EXAM  GENERAL: NAD, AAOx3  HEAD:  Atraumatic, Normocephalic  EYES: EOMI, PERRLA, conjunctiva and sclera clear  NECK: Supple, No JVD, No LAD  CHEST/LUNG: Clear to auscultation bilaterally; No wheeze  HEART: s1 s2 Regular rate and rhythm; No murmurs, rubs, or gallops  ABDOMEN: Soft, Nontender, Nondistended; Bowel sounds present X 4 quadrants   EXTREMITIES:  2+ Peripheral Pulses, No clubbing, cyanosis, or edema  SKIN: No rashes or lesions,  b/l LE not red, cool to touch,  no open skin no drainage  NEURO: nonfocal CN/motor/sensory/reflexes  Psych: normal affect and behavior, calm and cooperative

## 2020-01-07 NOTE — PROGRESS NOTE ADULT - PROBLEM SELECTOR PLAN 1
3 Vessel CAD.  discussed PCI vs. cabg.  wants to think about it but regardless would wait two weeks given recent bleeding to allow GI tract to heal.

## 2020-01-07 NOTE — PROGRESS NOTE ADULT - SUBJECTIVE AND OBJECTIVE BOX
INTERVAL HPI/OVERNIGHT EVENTS:   80 y/o F PMHx CAD s/p stent x1, Afib on eliquis, HTN, HLD, h/o GI bleed, lung CA s/p resection (no chemo/radiation) presented to ED with complaint of SOB and abdominal pressure. Patient states she was in her usual state of health until about two days ago when she noticed she had been getting a bit SOB on exertion. Patient also stated she had noticed her stools had become darker for the past 2 days as well. Patient complained about abdominal pressure similar to last time she had a GI bleed. Patient tapan mentioned she had intermittent chest pain, but that has since resolved.    In ED, patient noted to be anemic- Hgb 7, positive FOBT in ED, was transfused 1 unit PRBC. Also noted to have elevated troponin 0.115- given ASA 162mg x1. Patient also given metoprolol IV and PO, protonix IV x1, and Kcentra.     1/5/20- Patient seen and examined at bedside. States she feels well. Had EGD today- no identifiable source of fever identified, scheduled for colonoscopy in AM. Patient states her SOB has improved  1/6/20- Patient seen and examined at bedside. States she feels well. Had colonoscopy today- 2 polyps removed. GI recommending outpatient capsule study. Patient scheduled for cardiac cath in AM.  1/7/20- Patient seen and examined at bedside. s/p cardiac cath showing triple vessel disease and restenosis of stented artery.  States she feels well, denies any CP, SOB, abd pain.    MEDICATIONS  (STANDING):  amLODIPine   Tablet 5 milliGRAM(s) Oral daily  aspirin enteric coated 81 milliGRAM(s) Oral daily  atorvastatin 80 milliGRAM(s) Oral at bedtime  clopidogrel Tablet 75 milliGRAM(s) Oral daily  ferrous    sulfate 325 milliGRAM(s) Oral two times a day  metoprolol succinate ER 75 milliGRAM(s) Oral at bedtime  multivitamin 1 Tablet(s) Oral daily  pantoprazole    Tablet 40 milliGRAM(s) Oral before breakfast    MEDICATIONS  (PRN):  acetaminophen   Tablet .. 650 milliGRAM(s) Oral every 6 hours PRN Temp greater or equal to 38C (100.4F), Mild Pain (1 - 3)      Allergies    predniSONE (Other (Moderate))    Intolerances    ROS:  CONSTITUTIONAL: No weakness, fevers or chills  EYES/ENT: No visual changes;  No vertigo or throat pain   NECK: No pain or stiffness  RESPIRATORY: No cough, wheezing, hemoptysis; No shortness of breath  CARDIOVASCULAR: No chest pain or palpitations  GASTROINTESTINAL: No abdominal or epigastric pain. No nausea, vomiting, or hematemesis  GENITOURINARY: No dysuria, frequency or hematuria  NEUROLOGICAL: No numbness or weakness  SKIN: No itching, burning, rashes, or lesions   All other review of systems is negative unless indicated above.      Vital Signs Last 24 Hrs  T(C): 37.1 (07 Jan 2020 16:52), Max: 37.1 (07 Jan 2020 16:52)  T(F): 98.7 (07 Jan 2020 16:52), Max: 98.7 (07 Jan 2020 16:52)  HR: 83 (07 Jan 2020 16:52) (68 - 93)  BP: 135/54 (07 Jan 2020 16:52) (106/68 - 169/49)  BP(mean): 68 (07 Jan 2020 04:36) (68 - 68)  RR: 18 (07 Jan 2020 16:52) (16 - 18)  SpO2: 93% (07 Jan 2020 16:52) (93% - 99%)    01-07 @ 07:01  -  01-07 @ 18:11  --------------------------------------------------------  IN: 0 mL / OUT: 300 mL / NET: -300 mL        Physical Exam:  General: WN/WD NAD  Neurology: A&Ox3, nonfocal, BLANCA x 4  Respiratory: CTA B/L  CV: RRR, S1S2  Abdominal: Soft, NT, ND +BS  Extremities: No edema, + peripheral pulses      LABS:                        9.0    7.60  )-----------( 318      ( 07 Jan 2020 07:22 )             28.4     01-07    138  |  105  |  14  ----------------------------<  101<H>  4.0   |  27  |  0.84    Ca    8.4<L>      07 Jan 2020 07:22  Mg     1.9     01-07            RADIOLOGY & ADDITIONAL TESTS:

## 2020-01-07 NOTE — PROGRESS NOTE ADULT - SUBJECTIVE AND OBJECTIVE BOX
Nurse Practitioner Progress note:     HPI:  80 y/o F PMHx CAD s/p stent x1, Afib on eliquis, HTN, HLD, h/o GI bleed, lung CA s/p resection (no chemo/radiation) presented to ED with complaint of SOB and abdominal pressure. Patient noticed she had been getting a bit SOB on exertion. Patient also stated she had noticed her stools had become darker for the past 2 days as well. Patient complained about abdominal pressure similar to last time she had a GI bleed. Admitted for GIB. CE + planned for C today.     T(C): 36.9   HR: 78  BP: 158/61   RR: 16   SpO2: 96%       PHYSICAL EXAM:  NEURO: Non-focal, AxOx3.  No neuro deficits NECK: Supple, No JVD, No LAD  CHEST/LUNG: Clear to auscultation bilaterally; No wheeze  HEART: s1 s2 Regular rate and rhythm; No murmurs, rubs, or gallops  ABDOMEN: Soft, Nontender, Nondistended; Bowel sounds present X 4 quadrants   EXTREMITIES:  2+ Peripheral Pulses, No clubbing, cyanosis, or edema   VASCULAR: Peripheral pulses palpable 2+ bilaterally  PROCEDURE SITE: right hemoband to be removed by RN. ,Site is without hematoma or bleeding. Sensation and JAYLYN intact. Distal pulses palpable 2+, capillary refill < 2 seconds. Patient denies pain, numbness, tingling, CP or SOB.     PROCEDURE RESULTS: < from: Cardiac Cath Lab - Adult (01.07.20 @ 11:15) >   Diagnostic Conclusions   3 Vessel CAD with low NL LV FX   Recommendations   Discussion regarding revascualrization  < end of copied text >      ASSESSMENT:    80 y/o F PMHx CAD s/p stent x1, Afib on eliquis, HTN, HLD, h/o GI bleed, lung CA s/p resection (no chemo/radiation) presented to ED with complaint of SOB and abdominal pressure. Patient noticed she had been getting a bit SOB on exertion. Patient also stated she had noticed her stools had become darker for the past 2 days as well. Patient complained about abdominal pressure similar to last time she had a GI bleed. Admitted for GIB. CE + planned for C today.     s/p LHC revealing  3 Vessel CAD with low NL LV FX    PLAN:  -VS, diet, activity as per post cath orders  -Encourage PO fluids  -Continue current medications, Aspirin 81mg daily, Toprol XL 75mg at HS, Lipitor 80 at HS, Norvasc 5mg daily.   -Plan of care discussed with patient, daughter Dr. Obrien and Dr. Gonzalez   -Post cath instructions reviewed, patient verbalizes and understands instructions  -rest of care per primary team Nurse Practitioner Progress note:     HPI:  78 y/o F PMHx CAD s/p stent x1, Afib on eliquis, HTN, HLD, h/o GI bleed, lung CA s/p resection (no chemo/radiation) presented to ED with complaint of SOB and abdominal pressure. Patient noticed she had been getting a bit SOB on exertion. Patient also stated she had noticed her stools had become darker for the past 2 days as well. Patient complained about abdominal pressure similar to last time she had a GI bleed. Admitted for GIB. CE + planned for C today.     T(C): 36.9   HR: 78  BP: 158/61   RR: 16   SpO2: 96%       PHYSICAL EXAM:  NEURO: Non-focal, AxOx3.  No neuro deficits NECK: Supple, No JVD, No LAD  CHEST/LUNG: Clear to auscultation bilaterally; No wheeze  HEART: s1 s2 Regular rate and rhythm; No murmurs, rubs, or gallops  ABDOMEN: Soft, Nontender, Nondistended; Bowel sounds present X 4 quadrants   EXTREMITIES:  2+ Peripheral Pulses, No clubbing, cyanosis, or edema   VASCULAR: Peripheral pulses palpable 2+ bilaterally  PROCEDURE SITE: right hemoband to be removed by RN. ,Site is without hematoma or bleeding. Sensation and JAYLYN intact. Distal pulses palpable 2+, capillary refill < 2 seconds. Patient denies pain, numbness, tingling, CP or SOB.     PROCEDURE RESULTS: < from: Cardiac Cath Lab - Adult (01.07.20 @ 11:15) >   Diagnostic Conclusions   3 Vessel CAD with low NL LV FX   Recommendations   Discussion regarding revascualrization  < end of copied text >      ASSESSMENT:    78 y/o F PMHx CAD s/p stent x1, Afib on eliquis, HTN, HLD, h/o GI bleed, lung CA s/p resection (no chemo/radiation) presented to ED with complaint of SOB and abdominal pressure. Patient noticed she had been getting a bit SOB on exertion. Patient also stated she had noticed her stools had become darker for the past 2 days as well. Patient complained about abdominal pressure similar to last time she had a GI bleed. Admitted for GIB. CE + planned for C today.     s/p LHC revealing  3 Vessel CAD with low NL LV FX    PLAN:  -VS, diet, activity as per post cath orders  -Encourage PO fluids  -Continue current medications, Aspirin 81mg daily, Toprol XL 75mg at HS, Lipitor 80 at HS, Norvasc 5mg daily.   -Start Plavix 75mg daily   -Plan of care discussed with patient, daughter Dr. Obrien and Dr. Gonzalez   -Post cath instructions reviewed, patient verbalizes and understands instructions  -rest of care per primary team

## 2020-01-08 ENCOUNTER — TRANSCRIPTION ENCOUNTER (OUTPATIENT)
Age: 80
End: 2020-01-08

## 2020-01-08 VITALS
RESPIRATION RATE: 17 BRPM | HEART RATE: 83 BPM | TEMPERATURE: 98 F | OXYGEN SATURATION: 96 % | SYSTOLIC BLOOD PRESSURE: 150 MMHG | DIASTOLIC BLOOD PRESSURE: 60 MMHG

## 2020-01-08 LAB
ADD ON TEST-SPECIMEN IN LAB: SIGNIFICANT CHANGE UP
ANION GAP SERPL CALC-SCNC: 5 MMOL/L — SIGNIFICANT CHANGE UP (ref 5–17)
BUN SERPL-MCNC: 17 MG/DL — SIGNIFICANT CHANGE UP (ref 7–23)
CALCIUM SERPL-MCNC: 8.3 MG/DL — LOW (ref 8.5–10.1)
CHLORIDE SERPL-SCNC: 107 MMOL/L — SIGNIFICANT CHANGE UP (ref 96–108)
CO2 SERPL-SCNC: 27 MMOL/L — SIGNIFICANT CHANGE UP (ref 22–31)
CREAT SERPL-MCNC: 0.69 MG/DL — SIGNIFICANT CHANGE UP (ref 0.5–1.3)
GLUCOSE SERPL-MCNC: 97 MG/DL — SIGNIFICANT CHANGE UP (ref 70–99)
HCT VFR BLD CALC: 25.9 % — LOW (ref 34.5–45)
HCT VFR BLD CALC: 32.9 % — LOW (ref 34.5–45)
HGB BLD-MCNC: 10.7 G/DL — LOW (ref 11.5–15.5)
HGB BLD-MCNC: 8.3 G/DL — LOW (ref 11.5–15.5)
MCHC RBC-ENTMCNC: 28.7 PG — SIGNIFICANT CHANGE UP (ref 27–34)
MCHC RBC-ENTMCNC: 32 GM/DL — SIGNIFICANT CHANGE UP (ref 32–36)
MCV RBC AUTO: 89.6 FL — SIGNIFICANT CHANGE UP (ref 80–100)
PLATELET # BLD AUTO: 291 K/UL — SIGNIFICANT CHANGE UP (ref 150–400)
POTASSIUM SERPL-MCNC: 3.3 MMOL/L — LOW (ref 3.5–5.3)
POTASSIUM SERPL-MCNC: 4.1 MMOL/L — SIGNIFICANT CHANGE UP (ref 3.5–5.3)
POTASSIUM SERPL-SCNC: 3.3 MMOL/L — LOW (ref 3.5–5.3)
POTASSIUM SERPL-SCNC: 4.1 MMOL/L — SIGNIFICANT CHANGE UP (ref 3.5–5.3)
RBC # BLD: 2.89 M/UL — LOW (ref 3.8–5.2)
RBC # FLD: 14.6 % — HIGH (ref 10.3–14.5)
SODIUM SERPL-SCNC: 139 MMOL/L — SIGNIFICANT CHANGE UP (ref 135–145)
WBC # BLD: 6.46 K/UL — SIGNIFICANT CHANGE UP (ref 3.8–10.5)
WBC # FLD AUTO: 6.46 K/UL — SIGNIFICANT CHANGE UP (ref 3.8–10.5)

## 2020-01-08 PROCEDURE — 99233 SBSQ HOSP IP/OBS HIGH 50: CPT

## 2020-01-08 RX ORDER — FERROUS SULFATE 325(65) MG
1 TABLET ORAL
Qty: 0 | Refills: 0 | DISCHARGE
Start: 2020-01-08

## 2020-01-08 RX ORDER — PANTOPRAZOLE SODIUM 20 MG/1
1 TABLET, DELAYED RELEASE ORAL
Qty: 30 | Refills: 0
Start: 2020-01-08 | End: 2020-02-06

## 2020-01-08 RX ORDER — POTASSIUM CHLORIDE 20 MEQ
40 PACKET (EA) ORAL EVERY 4 HOURS
Refills: 0 | Status: COMPLETED | OUTPATIENT
Start: 2020-01-08 | End: 2020-01-08

## 2020-01-08 RX ORDER — FAMOTIDINE 10 MG/ML
1 INJECTION INTRAVENOUS
Qty: 0 | Refills: 0 | DISCHARGE

## 2020-01-08 RX ORDER — APIXABAN 2.5 MG/1
1 TABLET, FILM COATED ORAL
Qty: 0 | Refills: 0 | DISCHARGE

## 2020-01-08 RX ORDER — MAGNESIUM SULFATE 500 MG/ML
2 VIAL (ML) INJECTION ONCE
Refills: 0 | Status: COMPLETED | OUTPATIENT
Start: 2020-01-08 | End: 2020-01-08

## 2020-01-08 RX ORDER — CLOPIDOGREL BISULFATE 75 MG/1
1 TABLET, FILM COATED ORAL
Qty: 30 | Refills: 0
Start: 2020-01-08 | End: 2020-02-06

## 2020-01-08 RX ADMIN — Medication 325 MILLIGRAM(S): at 18:09

## 2020-01-08 RX ADMIN — Medication 40 MILLIEQUIVALENT(S): at 10:25

## 2020-01-08 RX ADMIN — AMLODIPINE BESYLATE 5 MILLIGRAM(S): 2.5 TABLET ORAL at 05:44

## 2020-01-08 RX ADMIN — Medication 325 MILLIGRAM(S): at 05:43

## 2020-01-08 RX ADMIN — Medication 1 TABLET(S): at 11:47

## 2020-01-08 RX ADMIN — Medication 650 MILLIGRAM(S): at 13:56

## 2020-01-08 RX ADMIN — CLOPIDOGREL BISULFATE 75 MILLIGRAM(S): 75 TABLET, FILM COATED ORAL at 11:47

## 2020-01-08 RX ADMIN — Medication 40 MILLIEQUIVALENT(S): at 15:02

## 2020-01-08 RX ADMIN — PANTOPRAZOLE SODIUM 40 MILLIGRAM(S): 20 TABLET, DELAYED RELEASE ORAL at 05:43

## 2020-01-08 RX ADMIN — Medication 81 MILLIGRAM(S): at 11:47

## 2020-01-08 RX ADMIN — Medication 50 GRAM(S): at 11:36

## 2020-01-08 NOTE — DISCHARGE NOTE NURSING/CASE MANAGEMENT/SOCIAL WORK - PATIENT PORTAL LINK FT
You can access the FollowMyHealth Patient Portal offered by [facility] by registering at the following website: [email or web address]. By joining [facility]’s FollowMyHealth portal, you will also be able to view your health information using other applications (apps) compatible with our system.

## 2020-01-08 NOTE — PROGRESS NOTE ADULT - PROBLEM SELECTOR PLAN 3
Currently in NSR. Once recovered from this would need to address AC issue and consider Watchman
Currently in NSR. Once recovered from this would need to address AC issue and likely will need assessment to see if she can get a watchman device.

## 2020-01-08 NOTE — CHART NOTE - NSCHARTNOTEFT_GEN_A_CORE
Called in to evaluate the patient for palpitations     Pt seen at bedside. Reports of palpitations but denies headache, dizziness, chest pain, SOB, nausea, vomiting, diarrhea or constipaiton  Pt with  PMHx CAD s/p stent x1, Afib on eliquis, HTN, HLD, h/o GI bleed, lung CA s/p resection (no chemo/radiation). s/p 1u PRBC, elevated troponins. Admitted for GI bleed and NSTEMI     Tele Strip reviewed: ?atrial fibrillation vs aflutter     Vitals: -150s, BP: 161/93 pulse So2 94% on RA, RR 18, temp 98.4     exam     Gen: NAD, comfortable  CV: s1, s2, +tachycardic, +regularly regular   Pulm: nl respiratory effort, CTAB, no wheezes/crackles/rhonchi  Abd: normoactive bowel sounds in all 4 quadrants, soft, nontender, nondistended, no rebound, no guarding, no masses  Extremities: no pedal edema, pedal pulses palpable   Skin: nl warm and dry, no wounds   Neuro: A&Ox3    ECHO reviewed  Colonoscopy results reviewed   EGD normal, per pt   Cath findings reviewed: +triple vessel disease       A/P   Palpitations 2/2 Narrow complex Supraventricular tachycardia   EKG: sinus tachycardia, HR: 109 ST segment depression in inferior leads and lateral leads, unchanged from prior EKG. EKG reviewed with Dr. Rivas   ?tele findings: afib vs aflutter   Labs stats  IV 5mg Lopressor   will monitor overnight     will signout to hospitalist     EKG reviewed with Dr. Rivas     Case and plan discussed with Dr. Rivas and Dr. Toledo (PGY-3) Called in to evaluate the patient for palpitations     Pt seen at bedside. Reports of palpitations but denies headache, dizziness, chest pain, SOB, nausea, vomiting, diarrhea or constipation. Denies hematemesis or melena.  Pt with  PMHx CAD s/p stent x1, Afib on eliquis, HTN, HLD, h/o GI bleed, lung CA s/p resection (no chemo/radiation). s/p 1u PRBC, elevated troponins. Admitted for GI bleed and NSTEMI, triple vessel disease. No intervention done yet and patient unsure PCI vs CABG    Tele Strip reviewed: ?atrial fibrillation vs aflutter     Vitals: -150s, BP: 161/93 pulse So2 94% on RA, RR 18, temp 98.4     exam     Gen: NAD, comfortable  CV: s1, s2, +tachycardic, +regularly regular   Pulm: nl respiratory effort, CTAB, no wheezes/crackles/rhonchi  Abd: normoactive bowel sounds in all 4 quadrants, soft, nontender, nondistended, no rebound, no guarding, no masses  Extremities: no pedal edema, pedal pulses palpable   Skin: nl warm and dry, no wounds   Neuro: A&Ox3    ECHO reviewed  Colonoscopy results reviewed   EGD normal, per pt   Cath findings reviewed: +triple vessel disease       A/P   Palpitations 2/2 Narrow complex Supraventricular tachycardia   EKG: sinus tachycardia, HR: 109 ST segment depression in inferior leads and lateral leads, unchanged from prior EKG. EKG reviewed with Dr. Rivas   ?tele findings: afib vs aflutter   electrolytes, H/H given recent GI bleeding   IV 5mg Lopressor   IVF   will monitor overnight     will signout to hospitalist     EKG reviewed with Dr. Rivas     Case and plan discussed with Dr. Rivas and Dr. Toledo (PGY-3) Called in to evaluate the patient for palpitations     Pt seen at bedside. Reports of palpitations but denies headache, dizziness, chest pain, SOB, nausea, vomiting, diarrhea or constipation. Denies hematemesis or melena.  Pt with  PMHx CAD s/p stent x1, Afib on eliquis, HTN, HLD, h/o GI bleed, lung CA s/p resection (no chemo/radiation). s/p 1u PRBC, elevated troponins. Admitted for GI bleed and NSTEMI, triple vessel disease. No intervention done yet and patient unsure PCI vs CABG    Tele Strip reviewed: ?atrial fibrillation vs aflutter     Vitals: -150s, BP: 161/93 pulse So2 94% on RA, RR 18, temp 98.4     exam     Gen: NAD, comfortable  CV: s1, s2, +tachycardic, +regularly regular   Pulm: nl respiratory effort, CTAB, no wheezes/crackles/rhonchi  Abd: normoactive bowel sounds in all 4 quadrants, soft, nontender, nondistended, no rebound, no guarding, no masses  Extremities: no pedal edema, pedal pulses palpable   Skin: nl warm and dry, no wounds   Neuro: A&Ox3    ECHO reviewed  Colonoscopy results reviewed   EGD normal, per pt   Cath findings reviewed: +triple vessel disease       A/P   Palpitations 2/2 Narrow complex Supraventricular tachycardia   EKG: sinus tachycardia, HR: 109 ST segment depression in inferior leads and lateral leads, unchanged from prior EKG. EKG reviewed with Dr. Rivas   ?tele findings: afib vs aflutter   electrolytes, H/H given recent GI bleeding   IV 5mg Lopressor   IVF   will monitor overnight     will signout to hospitalist     EKG reviewed with Dr. Rivas     Case and plan discussed with Dr. Rivas and Dr. Toledo (PGY-3)    Franky Dominguez (PGY-1)

## 2020-01-08 NOTE — PROGRESS NOTE ADULT - REASON FOR ADMISSION
GI bleed, chest discomfort
GI bleed, chest discomfort. CAD
GI bleed, chest discomfort
GI bleed, chest discomfort

## 2020-01-08 NOTE — DISCHARGE NOTE PROVIDER - PROVIDER TOKENS
PROVIDER:[TOKEN:[[id number]],FOLLOWUP:[2 weeks]],PROVIDER:[TOKEN:[[id number]],FOLLOWUP:[2 weeks]]

## 2020-01-08 NOTE — PROGRESS NOTE ADULT - PROBLEM SELECTOR PLAN 2
Improved s.p transfusion and stopping of Eliquis.

## 2020-01-08 NOTE — DISCHARGE NOTE PROVIDER - NSDCMRMEDTOKEN_GEN_ALL_CORE_FT
amLODIPine 5 mg oral tablet: 1 tab(s) orally once a day  aspirin 81 mg oral tablet: 1 tab(s) orally once a day  clopidogrel 75 mg oral tablet: 1 tab(s) orally once a day  ferrous sulfate 325 mg (65 mg elemental iron) oral delayed release tablet: 1 tab(s) orally 2 times a day  Lipitor 80 mg oral tablet: 1 tab(s) orally once a day  Metoprolol Succinate ER 50 mg oral tablet, extended release: 1.5 tab(s) orally once a day  Multiple Vitamins oral tablet: 1 tab(s) orally once a day  pantoprazole 40 mg oral delayed release tablet: 1 tab(s) orally once a day (before a meal)

## 2020-01-08 NOTE — DISCHARGE NOTE PROVIDER - CARE PROVIDER_API CALL
Devaughn Obrien)  Cardiovascular Disease  [address] [place] [address]  Phone: [phone]  Fax: [phone]  Follow Up Time: 2 weeks    Maik Ramirez)  Gastroenterology; Internal Medicine  [address] [place] [address]  Phone: [phone]  Fax: [phone]  Follow Up Time: 2 weeks

## 2020-01-08 NOTE — PROGRESS NOTE ADULT - SUBJECTIVE AND OBJECTIVE BOX
PCP:    REQUESTING PHYSICIAN:    REASON FOR CONSULT:    CHIEF COMPLAINT:    HPI:  78 y/o F PMHx CAD(Inferior MI 2008 (SOCRATES to RCA)), Afib on eliquis, HTN, HLD, PAD with intermittent claudication (Occluded Left iliac), h/o GI bleed (on AC), lung CA s/p resection (no chemo/radiation) presented to ED with complaint of SOB and abdominal pressure. Patient states she was in her usual state of health until about two days ago when she noticed she had been getting a bit SOB on exertion. Patient also stated she had noticed her stools had become darker for the past 2 days as well. Patient complained about abdominal pressure similar to last time she had a GI bleed. Patient alos mentioned she had intermittent chest pain, but that has since resolved. In ED, patient noted to be anemic- Hgb 7, positive FOBT in ED, was transfused 1 unit PRBC. Also noted to have elevated troponin 0.115- given ASA 162mg x1. Patient also given metoprolol IV and PO, protonix IV x1, and Kcentra. (2020 09:34) Cardiology called to evaluate symptoms of chest discomfort which pt describes as epigastric. She reports exertional dyspnea lately. Pt reports continued dyspnea after transfusion. Pt denies exertional chest pain.     20: Improved today after transfusion.  Agree with plan to have EGD today.  Discussed due to elevation of tropinin c/w NSTEMI in setting of HGB drop which has not happened previously and the symptoms she has had which seem to be brewing for a while that further ischemia evaluation is warranted.     20: Had Endoscopy yesterday and negative. plan for colonoscopy today.  Cath on tuesday once results known.      20: s/p cath and shows 3 Vessel CAD.  Discussed options for treatment.  wants to think about it but is leaning towards PCI.    20 Pt denies chest pain. H/H slightly lower today. Plan for PCI in 2 weeks.    PAST MEDICAL & SURGICAL HISTORY:  Anemia  Lung cancer  Peptic ulcer disease  Atrial fibrillation  MI (myocardial infarction)  CAD (coronary artery disease)  HTN (hypertension)  No significant past surgical history      SOCIAL HISTORY:    FAMILY HISTORY:  Family history of intracranial hemorrhage      ALLERGIES:  Allergies    predniSONE (Other (Moderate))    Intolerances        MEDICATIONS:    MEDICATIONS  (STANDING):  amLODIPine   Tablet 5 milliGRAM(s) Oral daily  aspirin enteric coated 81 milliGRAM(s) Oral daily  atorvastatin 80 milliGRAM(s) Oral at bedtime  clopidogrel Tablet 75 milliGRAM(s) Oral daily  ferrous    sulfate 325 milliGRAM(s) Oral two times a day  metoprolol succinate ER 75 milliGRAM(s) Oral at bedtime  multivitamin 1 Tablet(s) Oral daily  pantoprazole    Tablet 40 milliGRAM(s) Oral before breakfast  potassium chloride    Tablet ER 40 milliEquivalent(s) Oral every 4 hours  sodium chloride 0.9%. 1000 milliLiter(s) (75 mL/Hr) IV Continuous <Continuous>    MEDICATIONS  (PRN):  acetaminophen   Tablet .. 650 milliGRAM(s) Oral every 6 hours PRN Temp greater or equal to 38C (100.4F), Mild Pain (1 - 3)        Vital Signs Last 24 Hrs  T(C): 36.6 (2020 05:30), Max: 37.1 (2020 16:52)  T(F): 97.8 (2020 05:30), Max: 98.7 (2020 16:52)  HR: 71 (2020 05:30) (71 - 107)  BP: 131/64 (2020 05:30) (107/55 - 169/49)  BP(mean): 79 (2020 05:30) (79 - 111)  RR: 18 (2020 05:30) (16 - 18)  SpO2: 97% (2020 05:30) (93% - 99%)Daily     Daily Weight in k.6 (2020 09:48)I&O's Summary    2020 07:01  -  2020 07:00  --------------------------------------------------------  IN: 0 mL / OUT: 300 mL / NET: -300 mL        PHYSICAL EXAM:    Constitutional: NAD, awake and alert, well-developed  HEENT: PERR, EOMI,  No oral cyananosis.  Neck:  supple,  No JVD  Respiratory: Breath sounds are clear bilaterally, No wheezing, rales or rhonchi  Cardiovascular: S1 and S2, regular rate and rhythm, no Murmurs, gallops or rubs  Gastrointestinal: Bowel Sounds present, soft, nontender.   Extremities: No peripheral edema. No clubbing or cyanosis.  Vascular: 2+ peripheral pulses  Neurological: A/O x 3, no focal deficits  Musculoskeletal: no calf tenderness.  Skin: No rashes.      LABS: All Labs Reviewed:                        8.3    6.46  )-----------( 291      ( 2020 07:04 )             25.9                         8.9    8.71  )-----------( 301      ( 2020 22:25 )             27.6                         9.0    7.60  )-----------( 318      ( 2020 07:22 )             28.4     2020 07:04    139    |  107    |  17     ----------------------------<  97     3.3     |  27     |  0.69   2020 22:25    136    |  104    |  18     ----------------------------<  120    3.5     |  26     |  0.96   2020 07:22    138    |  105    |  14     ----------------------------<  101    4.0     |  27     |  0.84     Ca    8.3        2020 07:04  Ca    8.7        2020 22:25  Ca    8.4        2020 07:22  Phos  3.2       2020 22:25  Mg     1.7       2020 07:04  Mg     1.8       2020 22:25  Mg     1.9       2020 07:22    TPro  6.5    /  Alb  3.1    /  TBili  0.3    /  DBili  x      /  AST  17     /  ALT  23     /  AlkPhos  104    2020 22:25          Blood Culture:         RADIOLOGY/EKG:      ECHO/CARDIAC CATHTERIZATION/STRESS TEST:  < from: Cardiac Cath Lab - Adult (20 @ 11:15) >   Angiographic Findings     Cardiac Arteries and Lesion Findings    LMCA: Diffuse irregularity.There is mild diffuse disease noted.    LAD: Diffuse irregularity.     Prox LAD: Distal subsection.70% stenosis 12 mm length.Pre procedure LONNY   III flow was noted. Good runoff was present.The lesion was diagnosed as a   moderate risk lesion.    LCx: Diffuse irregularity.     Mid CX: Mid subsection.85% stenosis 16 mm length.Pre procedure LONNY III   flow was noted. Good runoff was present.The lesion was diagnosed as a   moderate risk lesion.    RCA: Diffuse irregularity.     ProxRCA: Ostial.100% stenosis 30 mm length.Pre procedure LONNY 0 flow was   noted. The lesion was diagnosed as a high risk lesion.     The lesion was previously treated with the following devices: drug eluting   stent.    Ramus: Diffuse irregularity.    Cardiac Collaterals    < end of copied text >

## 2020-01-08 NOTE — DISCHARGE NOTE PROVIDER - NSDCCPCAREPLAN_GEN_ALL_CORE_FT
PRINCIPAL DISCHARGE DIAGNOSIS  Diagnosis: GI bleed  Assessment and Plan of Treatment: -S/P endoscopy and colonoscopy- no identifiable source of bleeding noted  -Received 2 units packed RBCs  -Follow up with GI within 2 weeks for outpatient capsule study      SECONDARY DISCHARGE DIAGNOSES  Diagnosis: NSTEMI (non-ST elevated myocardial infarction)  Assessment and Plan of Treatment: -Cardiac cath showing triple vessel disease  -Follow up with Cardiology within 2 weeks  -Continue Aspirin and Plavix

## 2020-01-08 NOTE — DISCHARGE NOTE PROVIDER - HOSPITAL COURSE
80 y/o F PMHx CAD s/p stent x1, Afib on eliquis, HTN, HLD, h/o GI bleed, lung CA s/p resection (no chemo/radiation) presented to ED with complaint of SOB and abdominal pressure. Patient states she was in her usual state of health until about two days ago when she noticed she had been getting a bit SOB on exertion. Patient also stated she had noticed her stools had become darker for the past 2 days as well. Patient complained about abdominal pressure similar to last time she had a GI bleed. Patient alos mentioned she had intermittent chest pain, but that has since resolved.        In ED, patient noted to be anemic- Hgb 7, positive FOBT in ED, was transfused 1 unit PRBC. Also noted to have elevated troponin 0.115- given ASA 162mg x1. Patient also given metoprolol IV and PO, protonix IV x1, and Kcentra.         1/5/20- Patient seen and examined at bedside. States she feels well. Had EGD today- no identifiable source of fever identified, scheduled for colonoscopy in AM. Patient states her SOB has improved    1/6/20- Patient seen and examined at bedside. States she feels well. Had colonoscopy today- 2 polyps removed. GI recommending outpatient capsule study. Patient scheduled for cardiac cath in AM.    1/7/20- Patient seen and examined at bedside. s/p cardiac cath showing triple vessel disease and restenosis of stented artery.  States she feels well, denies any CP, SOB, abd pain.    1/8/20- Patient seen and examined at bedside. Overnight patient with episode of tachycardia- sinus tach vs. ?afib. Resolved after receiving dose of IV metoprolol. Discussed with Cardio, replete electrolytes, transfuse 1 unit PRBC and can d/c patient with close follow up.        Physical Exam:    General: WN/WD NAD    Neurology: A&Ox3, nonfocal, BLANCA x 4    Respiratory: CTA B/L    CV: RRR, S1S2    Abdominal: Soft, NT, ND +BS    Extremities: No edema, + peripheral pulses        #Anemia likely 2/2 GI bleed    #Elevated troponin    #Afib    #HTN    #HLD        Plan:    -s/p 2 unit PRBCs, serial H/H- stable    -s/p EGD, no source of bleed noted. s/p colonoscopy- 2 polyps removed- GI recommending outpatient capsule study    -Hold eliquis in light of GI bleed- discussed with patient regarding continuing eliquis as this is 3rd time patient has had GI bleed- wants to discuss with Cardio- stop eliquis    -Elevated troponin could be 2/2 demand ischemia due to anemia- patient with no current CP- chest pain resolved, spoke with cardio- recommending cardiac cath once patient finishes endoscopic workup- restarted ASA 81mg daily- Cardiac cath showing triple vessel disease with restenosis of stented artery- continue ASA and add plavix. Patient to be discharged with follow up with Cardio for PCI    -Rate control for afib- currently in sinus        Total time spent on discharge including coordination of care: 42 minutes

## 2020-01-08 NOTE — DISCHARGE NOTE PROVIDER - CARE PROVIDERS DIRECT ADDRESSES
[email or web address]

## 2020-01-15 DIAGNOSIS — Z88.8 ALLERGY STATUS TO OTHER DRUGS, MEDICAMENTS AND BIOLOGICAL SUBSTANCES STATUS: ICD-10-CM

## 2020-01-15 DIAGNOSIS — Z79.82 LONG TERM (CURRENT) USE OF ASPIRIN: ICD-10-CM

## 2020-01-15 DIAGNOSIS — Z95.5 PRESENCE OF CORONARY ANGIOPLASTY IMPLANT AND GRAFT: ICD-10-CM

## 2020-01-15 DIAGNOSIS — Z85.118 PERSONAL HISTORY OF OTHER MALIGNANT NEOPLASM OF BRONCHUS AND LUNG: ICD-10-CM

## 2020-01-15 DIAGNOSIS — Z79.01 LONG TERM (CURRENT) USE OF ANTICOAGULANTS: ICD-10-CM

## 2020-01-15 DIAGNOSIS — K92.2 GASTROINTESTINAL HEMORRHAGE, UNSPECIFIED: ICD-10-CM

## 2020-01-15 DIAGNOSIS — I10 ESSENTIAL (PRIMARY) HYPERTENSION: ICD-10-CM

## 2020-01-15 DIAGNOSIS — K44.9 DIAPHRAGMATIC HERNIA WITHOUT OBSTRUCTION OR GANGRENE: ICD-10-CM

## 2020-01-15 DIAGNOSIS — D12.2 BENIGN NEOPLASM OF ASCENDING COLON: ICD-10-CM

## 2020-01-15 DIAGNOSIS — I21.4 NON-ST ELEVATION (NSTEMI) MYOCARDIAL INFARCTION: ICD-10-CM

## 2020-01-15 DIAGNOSIS — I25.2 OLD MYOCARDIAL INFARCTION: ICD-10-CM

## 2020-01-15 DIAGNOSIS — D64.9 ANEMIA, UNSPECIFIED: ICD-10-CM

## 2020-01-15 DIAGNOSIS — Z95.818 PRESENCE OF OTHER CARDIAC IMPLANTS AND GRAFTS: ICD-10-CM

## 2020-01-15 DIAGNOSIS — I73.9 PERIPHERAL VASCULAR DISEASE, UNSPECIFIED: ICD-10-CM

## 2020-01-15 DIAGNOSIS — E78.5 HYPERLIPIDEMIA, UNSPECIFIED: ICD-10-CM

## 2020-01-15 DIAGNOSIS — I48.0 PAROXYSMAL ATRIAL FIBRILLATION: ICD-10-CM

## 2020-01-15 DIAGNOSIS — Z87.891 PERSONAL HISTORY OF NICOTINE DEPENDENCE: ICD-10-CM

## 2020-01-15 DIAGNOSIS — Z90.2 ACQUIRED ABSENCE OF LUNG [PART OF]: ICD-10-CM

## 2020-01-15 DIAGNOSIS — D12.0 BENIGN NEOPLASM OF CECUM: ICD-10-CM

## 2020-01-15 DIAGNOSIS — I25.10 ATHEROSCLEROTIC HEART DISEASE OF NATIVE CORONARY ARTERY WITHOUT ANGINA PECTORIS: ICD-10-CM

## 2020-01-15 DIAGNOSIS — I47.1 SUPRAVENTRICULAR TACHYCARDIA: ICD-10-CM

## 2020-01-15 DIAGNOSIS — Z86.19 PERSONAL HISTORY OF OTHER INFECTIOUS AND PARASITIC DISEASES: ICD-10-CM

## 2020-02-05 DIAGNOSIS — Z87.19 PERSONAL HISTORY OF OTHER DISEASES OF THE DIGESTIVE SYSTEM: ICD-10-CM

## 2020-02-05 DIAGNOSIS — Z86.2 PERSONAL HISTORY OF DISEASES OF THE BLOOD AND BLOOD-FORMING ORGANS AND CERTAIN DISORDERS INVOLVING THE IMMUNE MECHANISM: ICD-10-CM

## 2020-02-05 RX ORDER — APIXABAN 2.5 MG/1
2.5 TABLET, FILM COATED ORAL
Qty: 180 | Refills: 3 | Status: DISCONTINUED | COMMUNITY
Start: 2019-04-09 | End: 2020-02-05

## 2020-02-05 RX ORDER — PANTOPRAZOLE 40 MG/1
40 TABLET, DELAYED RELEASE ORAL DAILY
Qty: 90 | Refills: 3 | Status: ACTIVE | COMMUNITY
Start: 2020-02-05 | End: 1900-01-01

## 2020-02-05 RX ORDER — CLOPIDOGREL BISULFATE 75 MG/1
75 TABLET, FILM COATED ORAL DAILY
Qty: 90 | Refills: 3 | Status: ACTIVE | COMMUNITY
Start: 2020-02-05 | End: 1900-01-01

## 2020-02-05 RX ORDER — FAMOTIDINE 20 MG/1
20 TABLET, FILM COATED ORAL
Qty: 180 | Refills: 3 | Status: DISCONTINUED | COMMUNITY
Start: 2019-10-25 | End: 2020-02-05

## 2020-02-07 ENCOUNTER — APPOINTMENT (OUTPATIENT)
Dept: CARDIOLOGY | Facility: CLINIC | Age: 80
End: 2020-02-07
Payer: MEDICARE

## 2020-02-07 VITALS
WEIGHT: 124 LBS | SYSTOLIC BLOOD PRESSURE: 149 MMHG | HEART RATE: 76 BPM | HEIGHT: 62 IN | DIASTOLIC BLOOD PRESSURE: 85 MMHG | OXYGEN SATURATION: 97 % | BODY MASS INDEX: 22.82 KG/M2

## 2020-02-07 DIAGNOSIS — I25.2 OLD MYOCARDIAL INFARCTION: ICD-10-CM

## 2020-02-07 DIAGNOSIS — I10 ESSENTIAL (PRIMARY) HYPERTENSION: ICD-10-CM

## 2020-02-07 DIAGNOSIS — I65.29 OCCLUSION AND STENOSIS OF UNSPECIFIED CAROTID ARTERY: ICD-10-CM

## 2020-02-07 DIAGNOSIS — E78.5 HYPERLIPIDEMIA, UNSPECIFIED: ICD-10-CM

## 2020-02-07 DIAGNOSIS — I25.10 ATHEROSCLEROTIC HEART DISEASE OF NATIVE CORONARY ARTERY W/OUT ANGINA PECTORIS: ICD-10-CM

## 2020-02-07 DIAGNOSIS — I71.4 ABDOMINAL AORTIC ANEURYSM, W/OUT RUPTURE: ICD-10-CM

## 2020-02-07 DIAGNOSIS — I73.9 PERIPHERAL VASCULAR DISEASE, UNSPECIFIED: ICD-10-CM

## 2020-02-07 DIAGNOSIS — I48.0 PAROXYSMAL ATRIAL FIBRILLATION: ICD-10-CM

## 2020-02-07 PROCEDURE — 99215 OFFICE O/P EST HI 40 MIN: CPT

## 2020-02-07 PROCEDURE — 93000 ELECTROCARDIOGRAM COMPLETE: CPT

## 2020-02-07 RX ORDER — LOSARTAN POTASSIUM 25 MG/1
25 TABLET, FILM COATED ORAL TWICE DAILY
Qty: 90 | Refills: 2 | Status: DISCONTINUED | COMMUNITY
Start: 2017-10-26 | End: 2020-02-07

## 2020-02-07 RX ORDER — AMLODIPINE BESYLATE 5 MG/1
5 TABLET ORAL
Qty: 90 | Refills: 3 | Status: ACTIVE | COMMUNITY
Start: 2019-02-27 | End: 1900-01-01

## 2020-02-11 LAB
25(OH)D3 SERPL-MCNC: 38.5 NG/ML
ALBUMIN SERPL ELPH-MCNC: 4.4 G/DL
ALP BLD-CCNC: 111 U/L
ALT SERPL-CCNC: 15 U/L
ANION GAP SERPL CALC-SCNC: 17 MMOL/L
AST SERPL-CCNC: 18 U/L
BASOPHILS # BLD AUTO: 0.06 K/UL
BASOPHILS NFR BLD AUTO: 0.7 %
BILIRUB SERPL-MCNC: 0.2 MG/DL
BUN SERPL-MCNC: 21 MG/DL
CALCIUM SERPL-MCNC: 9.8 MG/DL
CHLORIDE SERPL-SCNC: 98 MMOL/L
CHOLEST SERPL-MCNC: 192 MG/DL
CHOLEST/HDLC SERPL: 3 RATIO
CK SERPL-CCNC: 38 U/L
CO2 SERPL-SCNC: 25 MMOL/L
CREAT SERPL-MCNC: 0.78 MG/DL
EOSINOPHIL # BLD AUTO: 0.16 K/UL
EOSINOPHIL NFR BLD AUTO: 1.8 %
ESTIMATED AVERAGE GLUCOSE: 103 MG/DL
FERRITIN SERPL-MCNC: 26 NG/ML
GLUCOSE SERPL-MCNC: 93 MG/DL
HBA1C MFR BLD HPLC: 5.2 %
HCT VFR BLD CALC: 43.2 %
HDLC SERPL-MCNC: 64 MG/DL
HGB BLD-MCNC: 13.2 G/DL
IMM GRANULOCYTES NFR BLD AUTO: 0.2 %
IRON SATN MFR SERPL: 9 %
IRON SERPL-MCNC: 34 UG/DL
LDLC SERPL CALC-MCNC: 110 MG/DL
LDLC SERPL DIRECT ASSAY-MCNC: 117 MG/DL
LYMPHOCYTES # BLD AUTO: 1.83 K/UL
LYMPHOCYTES NFR BLD AUTO: 20.7 %
MAN DIFF?: NORMAL
MCHC RBC-ENTMCNC: 27.4 PG
MCHC RBC-ENTMCNC: 30.6 GM/DL
MCV RBC AUTO: 89.6 FL
MONOCYTES # BLD AUTO: 0.94 K/UL
MONOCYTES NFR BLD AUTO: 10.6 %
NEUTROPHILS # BLD AUTO: 5.84 K/UL
NEUTROPHILS NFR BLD AUTO: 66 %
PLATELET # BLD AUTO: 314 K/UL
POTASSIUM SERPL-SCNC: 4.5 MMOL/L
PROT SERPL-MCNC: 6.9 G/DL
RBC # BLD: 4.82 M/UL
RBC # FLD: 16.8 %
SODIUM SERPL-SCNC: 140 MMOL/L
TIBC SERPL-MCNC: 373 UG/DL
TRIGL SERPL-MCNC: 89 MG/DL
TSH SERPL-ACNC: 2.35 UIU/ML
UIBC SERPL-MCNC: 339 UG/DL
WBC # FLD AUTO: 8.85 K/UL

## 2020-02-23 ENCOUNTER — NON-APPOINTMENT (OUTPATIENT)
Age: 80
End: 2020-02-23

## 2020-02-24 NOTE — HISTORY OF PRESENT ILLNESS
[FreeTextEntry1] : Hospitalized with SOB and CP and found to be severely anemic and dx. with GI bleed. Ruled in for NSTEMI.  Underwent Cardiac cath and found to have 3 Vessel CAD.  DOAC was stopped and discussion regarding revascularization was had.  CABG versus MV PCI. We discussed allowing her to recover and then when better proceeding with revasc. She has since consulted with some second opinions who have discussed watchman for protection against CVA as this has been a recurrent issue for her with AC and she also saw a CT surgeon and another IC regarding options for revascularization. She is still very much against CABG even though this the best option.  We also did discuss PCI as well.  She still wants time to think about all this.  She denies CP, SOB, JORGE, or LE edema. \par \par

## 2020-02-24 NOTE — PHYSICAL EXAM
[Normal Appearance] : normal appearance [Well Groomed] : well groomed [General Appearance - Well Developed] : well developed [No Deformities] : no deformities [General Appearance - Well Nourished] : well nourished [General Appearance - In No Acute Distress] : no acute distress [Normal Conjunctiva] : the conjunctiva exhibited no abnormalities [No Oral Pallor] : no oral pallor [Eyelids - No Xanthelasma] : the eyelids demonstrated no xanthelasmas [Normal Oral Mucosa] : normal oral mucosa [No Oral Cyanosis] : no oral cyanosis [Normal Jugular Venous V Waves Present] : normal jugular venous V waves present [Normal Jugular Venous A Waves Present] : normal jugular venous A waves present [No Jugular Venous Dickey A Waves] : no jugular venous dickey A waves [Auscultation Breath Sounds / Voice Sounds] : lungs were clear to auscultation bilaterally [Abdomen Tenderness] : non-tender [Abdomen Soft] : soft [Abnormal Walk] : normal gait [Abdomen Mass (___ Cm)] : no abdominal mass palpated [Gait - Sufficient For Exercise Testing] : the gait was sufficient for exercise testing [Nail Clubbing] : no clubbing of the fingernails [Cyanosis, Localized] : no localized cyanosis [Skin Color & Pigmentation] : normal skin color and pigmentation [Petechial Hemorrhages (___cm)] : no petechial hemorrhages [Skin Lesions] : no skin lesions [No Venous Stasis] : no venous stasis [] : no rash [Oriented To Time, Place, And Person] : oriented to person, place, and time [No Xanthoma] : no  xanthoma was observed [No Skin Ulcers] : no skin ulcer [No Anxiety] : not feeling anxious [Mood] : the mood was normal [Affect] : the affect was normal [Normal S2] : normal S2 [Normal S1] : normal S1 [Normal Rate] : normal [I] : a grade 1 [S4] : an S4 was heard [2+] : left 2+ [No Pitting Edema] : no pitting edema present [No Abnormalities] : the abdominal aorta was not enlarged and no bruit was heard [1+] : left 1+ [FreeTextEntry1] : Decreased breath sounds at both bases [S3] : no S3 [Right Carotid Bruit] : no bruit heard over the right carotid [Left Carotid Bruit] : no bruit heard over the left carotid [Right Femoral Bruit] : no bruit heard over the right femoral artery [Left Femoral Bruit] : no bruit heard over the left femoral artery

## 2020-03-01 ENCOUNTER — INPATIENT (INPATIENT)
Facility: HOSPITAL | Age: 80
LOS: 6 days | Discharge: ANOTHER HOSPITAL | DRG: 292 | End: 2020-03-08
Attending: INTERNAL MEDICINE | Admitting: INTERNAL MEDICINE
Payer: MEDICARE

## 2020-03-01 VITALS
OXYGEN SATURATION: 93 % | HEIGHT: 62 IN | DIASTOLIC BLOOD PRESSURE: 44 MMHG | SYSTOLIC BLOOD PRESSURE: 190 MMHG | TEMPERATURE: 99 F | WEIGHT: 119.93 LBS | RESPIRATION RATE: 18 BRPM | HEART RATE: 79 BPM

## 2020-03-01 DIAGNOSIS — R06.02 SHORTNESS OF BREATH: ICD-10-CM

## 2020-03-01 DIAGNOSIS — I10 ESSENTIAL (PRIMARY) HYPERTENSION: ICD-10-CM

## 2020-03-01 DIAGNOSIS — I48.0 PAROXYSMAL ATRIAL FIBRILLATION: ICD-10-CM

## 2020-03-01 DIAGNOSIS — Z95.828 PRESENCE OF OTHER VASCULAR IMPLANTS AND GRAFTS: Chronic | ICD-10-CM

## 2020-03-01 DIAGNOSIS — I25.10 ATHEROSCLEROTIC HEART DISEASE OF NATIVE CORONARY ARTERY WITHOUT ANGINA PECTORIS: ICD-10-CM

## 2020-03-01 DIAGNOSIS — R07.9 CHEST PAIN, UNSPECIFIED: ICD-10-CM

## 2020-03-01 LAB
ADD ON TEST-SPECIMEN IN LAB: SIGNIFICANT CHANGE UP
ALBUMIN SERPL ELPH-MCNC: 2 G/DL — LOW (ref 3.3–5)
ALP SERPL-CCNC: 90 U/L — SIGNIFICANT CHANGE UP (ref 40–120)
ALT FLD-CCNC: 27 U/L — SIGNIFICANT CHANGE UP (ref 12–78)
ANION GAP SERPL CALC-SCNC: 5 MMOL/L — SIGNIFICANT CHANGE UP (ref 5–17)
ANION GAP SERPL CALC-SCNC: 5 MMOL/L — SIGNIFICANT CHANGE UP (ref 5–17)
APTT BLD: 26.8 SEC — LOW (ref 27.5–36.3)
AST SERPL-CCNC: 26 U/L — SIGNIFICANT CHANGE UP (ref 15–37)
BASOPHILS # BLD AUTO: 0.05 K/UL — SIGNIFICANT CHANGE UP (ref 0–0.2)
BASOPHILS NFR BLD AUTO: 0.3 % — SIGNIFICANT CHANGE UP (ref 0–2)
BILIRUB SERPL-MCNC: 0.5 MG/DL — SIGNIFICANT CHANGE UP (ref 0.2–1.2)
BUN SERPL-MCNC: 13 MG/DL — SIGNIFICANT CHANGE UP (ref 7–23)
BUN SERPL-MCNC: 13 MG/DL — SIGNIFICANT CHANGE UP (ref 7–23)
CALCIUM SERPL-MCNC: 8.5 MG/DL — SIGNIFICANT CHANGE UP (ref 8.5–10.1)
CALCIUM SERPL-MCNC: 8.7 MG/DL — SIGNIFICANT CHANGE UP (ref 8.5–10.1)
CHLORIDE SERPL-SCNC: 100 MMOL/L — SIGNIFICANT CHANGE UP (ref 96–108)
CHLORIDE SERPL-SCNC: 104 MMOL/L — SIGNIFICANT CHANGE UP (ref 96–108)
CK SERPL-CCNC: 62 U/L — SIGNIFICANT CHANGE UP (ref 26–192)
CO2 SERPL-SCNC: 28 MMOL/L — SIGNIFICANT CHANGE UP (ref 22–31)
CO2 SERPL-SCNC: 29 MMOL/L — SIGNIFICANT CHANGE UP (ref 22–31)
CREAT SERPL-MCNC: 0.72 MG/DL — SIGNIFICANT CHANGE UP (ref 0.5–1.3)
CREAT SERPL-MCNC: 0.75 MG/DL — SIGNIFICANT CHANGE UP (ref 0.5–1.3)
EOSINOPHIL # BLD AUTO: 0.3 K/UL — SIGNIFICANT CHANGE UP (ref 0–0.5)
EOSINOPHIL NFR BLD AUTO: 2 % — SIGNIFICANT CHANGE UP (ref 0–6)
GLUCOSE SERPL-MCNC: 102 MG/DL — HIGH (ref 70–99)
GLUCOSE SERPL-MCNC: 111 MG/DL — HIGH (ref 70–99)
HCT VFR BLD CALC: 32 % — LOW (ref 34.5–45)
HCT VFR BLD CALC: 32.9 % — LOW (ref 34.5–45)
HGB BLD-MCNC: 10.3 G/DL — LOW (ref 11.5–15.5)
HGB BLD-MCNC: 10.4 G/DL — LOW (ref 11.5–15.5)
IMM GRANULOCYTES NFR BLD AUTO: 0.3 % — SIGNIFICANT CHANGE UP (ref 0–1.5)
INR BLD: 1.12 RATIO — SIGNIFICANT CHANGE UP (ref 0.88–1.16)
LACTATE SERPL-SCNC: 0.9 MMOL/L — SIGNIFICANT CHANGE UP (ref 0.7–2)
LIDOCAIN IGE QN: 209 U/L — SIGNIFICANT CHANGE UP (ref 73–393)
LYMPHOCYTES # BLD AUTO: 0.68 K/UL — LOW (ref 1–3.3)
LYMPHOCYTES # BLD AUTO: 4.4 % — LOW (ref 13–44)
MAGNESIUM SERPL-MCNC: 2 MG/DL — SIGNIFICANT CHANGE UP (ref 1.6–2.6)
MCHC RBC-ENTMCNC: 27.9 PG — SIGNIFICANT CHANGE UP (ref 27–34)
MCHC RBC-ENTMCNC: 28.1 PG — SIGNIFICANT CHANGE UP (ref 27–34)
MCHC RBC-ENTMCNC: 31.6 GM/DL — LOW (ref 32–36)
MCHC RBC-ENTMCNC: 32.2 GM/DL — SIGNIFICANT CHANGE UP (ref 32–36)
MCV RBC AUTO: 87.4 FL — SIGNIFICANT CHANGE UP (ref 80–100)
MCV RBC AUTO: 88.2 FL — SIGNIFICANT CHANGE UP (ref 80–100)
MONOCYTES # BLD AUTO: 1.67 K/UL — HIGH (ref 0–0.9)
MONOCYTES NFR BLD AUTO: 10.9 % — SIGNIFICANT CHANGE UP (ref 2–14)
NEUTROPHILS # BLD AUTO: 12.56 K/UL — HIGH (ref 1.8–7.4)
NEUTROPHILS NFR BLD AUTO: 82.1 % — HIGH (ref 43–77)
PLATELET # BLD AUTO: 398 K/UL — SIGNIFICANT CHANGE UP (ref 150–400)
PLATELET # BLD AUTO: 398 K/UL — SIGNIFICANT CHANGE UP (ref 150–400)
POTASSIUM SERPL-MCNC: 3.7 MMOL/L — SIGNIFICANT CHANGE UP (ref 3.5–5.3)
POTASSIUM SERPL-MCNC: 3.9 MMOL/L — SIGNIFICANT CHANGE UP (ref 3.5–5.3)
POTASSIUM SERPL-SCNC: 3.7 MMOL/L — SIGNIFICANT CHANGE UP (ref 3.5–5.3)
POTASSIUM SERPL-SCNC: 3.9 MMOL/L — SIGNIFICANT CHANGE UP (ref 3.5–5.3)
PROT SERPL-MCNC: 6.2 GM/DL — SIGNIFICANT CHANGE UP (ref 6–8.3)
PROTHROM AB SERPL-ACNC: 12.5 SEC — SIGNIFICANT CHANGE UP (ref 10–12.9)
RAPID RVP RESULT: SIGNIFICANT CHANGE UP
RBC # BLD: 3.66 M/UL — LOW (ref 3.8–5.2)
RBC # BLD: 3.73 M/UL — LOW (ref 3.8–5.2)
RBC # FLD: 16.5 % — HIGH (ref 10.3–14.5)
RBC # FLD: 16.5 % — HIGH (ref 10.3–14.5)
SODIUM SERPL-SCNC: 134 MMOL/L — LOW (ref 135–145)
SODIUM SERPL-SCNC: 137 MMOL/L — SIGNIFICANT CHANGE UP (ref 135–145)
TROPONIN I SERPL-MCNC: 0.04 NG/ML — SIGNIFICANT CHANGE UP (ref 0.01–0.04)
TROPONIN I SERPL-MCNC: 0.04 NG/ML — SIGNIFICANT CHANGE UP (ref 0.01–0.04)
WBC # BLD: 15.3 K/UL — HIGH (ref 3.8–10.5)
WBC # BLD: 15.4 K/UL — HIGH (ref 3.8–10.5)
WBC # FLD AUTO: 15.3 K/UL — HIGH (ref 3.8–10.5)
WBC # FLD AUTO: 15.4 K/UL — HIGH (ref 3.8–10.5)

## 2020-03-01 PROCEDURE — 99223 1ST HOSP IP/OBS HIGH 75: CPT

## 2020-03-01 PROCEDURE — 82272 OCCULT BLD FECES 1-3 TESTS: CPT

## 2020-03-01 PROCEDURE — 94640 AIRWAY INHALATION TREATMENT: CPT

## 2020-03-01 PROCEDURE — 99222 1ST HOSP IP/OBS MODERATE 55: CPT | Mod: AI

## 2020-03-01 PROCEDURE — 93005 ELECTROCARDIOGRAM TRACING: CPT

## 2020-03-01 PROCEDURE — 12345: CPT | Mod: NC

## 2020-03-01 PROCEDURE — 85014 HEMATOCRIT: CPT

## 2020-03-01 PROCEDURE — 85018 HEMOGLOBIN: CPT

## 2020-03-01 PROCEDURE — 97163 PT EVAL HIGH COMPLEX 45 MIN: CPT | Mod: GP

## 2020-03-01 PROCEDURE — 71045 X-RAY EXAM CHEST 1 VIEW: CPT | Mod: 26

## 2020-03-01 PROCEDURE — 85027 COMPLETE CBC AUTOMATED: CPT

## 2020-03-01 PROCEDURE — C9113: CPT

## 2020-03-01 PROCEDURE — 93010 ELECTROCARDIOGRAM REPORT: CPT

## 2020-03-01 PROCEDURE — 97530 THERAPEUTIC ACTIVITIES: CPT | Mod: GP

## 2020-03-01 PROCEDURE — 97116 GAIT TRAINING THERAPY: CPT | Mod: GP

## 2020-03-01 PROCEDURE — 84484 ASSAY OF TROPONIN QUANT: CPT

## 2020-03-01 PROCEDURE — 80048 BASIC METABOLIC PNL TOTAL CA: CPT

## 2020-03-01 PROCEDURE — 71045 X-RAY EXAM CHEST 1 VIEW: CPT

## 2020-03-01 PROCEDURE — 36415 COLL VENOUS BLD VENIPUNCTURE: CPT

## 2020-03-01 PROCEDURE — 83735 ASSAY OF MAGNESIUM: CPT

## 2020-03-01 PROCEDURE — 71275 CT ANGIOGRAPHY CHEST: CPT | Mod: 26

## 2020-03-01 RX ORDER — METOPROLOL TARTRATE 50 MG
1.5 TABLET ORAL
Qty: 0 | Refills: 0 | DISCHARGE

## 2020-03-01 RX ORDER — CLOPIDOGREL BISULFATE 75 MG/1
75 TABLET, FILM COATED ORAL DAILY
Refills: 0 | Status: DISCONTINUED | OUTPATIENT
Start: 2020-03-01 | End: 2020-03-08

## 2020-03-01 RX ORDER — OXYCODONE HYDROCHLORIDE 5 MG/1
5 TABLET ORAL EVERY 8 HOURS
Refills: 0 | Status: DISCONTINUED | OUTPATIENT
Start: 2020-03-01 | End: 2020-03-03

## 2020-03-01 RX ORDER — ACETAMINOPHEN 500 MG
650 TABLET ORAL EVERY 6 HOURS
Refills: 0 | Status: DISCONTINUED | OUTPATIENT
Start: 2020-03-01 | End: 2020-03-08

## 2020-03-01 RX ORDER — METOPROLOL TARTRATE 50 MG
100 TABLET ORAL DAILY
Refills: 0 | Status: DISCONTINUED | OUTPATIENT
Start: 2020-03-01 | End: 2020-03-08

## 2020-03-01 RX ORDER — FUROSEMIDE 40 MG
40 TABLET ORAL DAILY
Refills: 0 | Status: COMPLETED | OUTPATIENT
Start: 2020-03-01 | End: 2020-03-03

## 2020-03-01 RX ORDER — METOPROLOL TARTRATE 50 MG
1 TABLET ORAL
Qty: 0 | Refills: 0 | DISCHARGE

## 2020-03-01 RX ORDER — MAGNESIUM OXIDE 400 MG ORAL TABLET 241.3 MG
1 TABLET ORAL
Qty: 0 | Refills: 0 | DISCHARGE

## 2020-03-01 RX ORDER — PIPERACILLIN AND TAZOBACTAM 4; .5 G/20ML; G/20ML
3.38 INJECTION, POWDER, LYOPHILIZED, FOR SOLUTION INTRAVENOUS ONCE
Refills: 0 | Status: DISCONTINUED | OUTPATIENT
Start: 2020-03-01 | End: 2020-03-01

## 2020-03-01 RX ORDER — AMLODIPINE BESYLATE 2.5 MG/1
1 TABLET ORAL
Qty: 0 | Refills: 0 | DISCHARGE

## 2020-03-01 RX ORDER — FUROSEMIDE 40 MG
40 TABLET ORAL ONCE
Refills: 0 | Status: COMPLETED | OUTPATIENT
Start: 2020-03-01 | End: 2020-03-01

## 2020-03-01 RX ORDER — ATORVASTATIN CALCIUM 80 MG/1
80 TABLET, FILM COATED ORAL AT BEDTIME
Refills: 0 | Status: DISCONTINUED | OUTPATIENT
Start: 2020-03-01 | End: 2020-03-08

## 2020-03-01 RX ORDER — LOSARTAN POTASSIUM 100 MG/1
1 TABLET, FILM COATED ORAL
Qty: 0 | Refills: 0 | DISCHARGE

## 2020-03-01 RX ORDER — LOSARTAN POTASSIUM 100 MG/1
100 TABLET, FILM COATED ORAL DAILY
Refills: 0 | Status: DISCONTINUED | OUTPATIENT
Start: 2020-03-01 | End: 2020-03-08

## 2020-03-01 RX ORDER — AMIODARONE HYDROCHLORIDE 400 MG/1
200 TABLET ORAL DAILY
Refills: 0 | Status: DISCONTINUED | OUTPATIENT
Start: 2020-03-01 | End: 2020-03-08

## 2020-03-01 RX ORDER — FOLIC ACID 0.8 MG
1 TABLET ORAL
Qty: 0 | Refills: 0 | DISCHARGE

## 2020-03-01 RX ORDER — HYDRALAZINE HCL 50 MG
50 TABLET ORAL THREE TIMES A DAY
Refills: 0 | Status: DISCONTINUED | OUTPATIENT
Start: 2020-03-01 | End: 2020-03-02

## 2020-03-01 RX ORDER — VANCOMYCIN HCL 1 G
750 VIAL (EA) INTRAVENOUS ONCE
Refills: 0 | Status: DISCONTINUED | OUTPATIENT
Start: 2020-03-01 | End: 2020-03-01

## 2020-03-01 RX ORDER — OXYCODONE HYDROCHLORIDE 5 MG/1
5 TABLET ORAL DAILY
Refills: 0 | Status: DISCONTINUED | OUTPATIENT
Start: 2020-03-01 | End: 2020-03-01

## 2020-03-01 RX ORDER — IPRATROPIUM/ALBUTEROL SULFATE 18-103MCG
3 AEROSOL WITH ADAPTER (GRAM) INHALATION EVERY 6 HOURS
Refills: 0 | Status: DISCONTINUED | OUTPATIENT
Start: 2020-03-01 | End: 2020-03-08

## 2020-03-01 RX ORDER — SENNA PLUS 8.6 MG/1
2 TABLET ORAL
Qty: 0 | Refills: 0 | DISCHARGE

## 2020-03-01 RX ORDER — ATORVASTATIN CALCIUM 80 MG/1
1 TABLET, FILM COATED ORAL
Qty: 0 | Refills: 0 | DISCHARGE

## 2020-03-01 RX ORDER — SENNA PLUS 8.6 MG/1
2 TABLET ORAL AT BEDTIME
Refills: 0 | Status: DISCONTINUED | OUTPATIENT
Start: 2020-03-01 | End: 2020-03-08

## 2020-03-01 RX ORDER — ASPIRIN/CALCIUM CARB/MAGNESIUM 324 MG
81 TABLET ORAL DAILY
Refills: 0 | Status: DISCONTINUED | OUTPATIENT
Start: 2020-03-01 | End: 2020-03-08

## 2020-03-01 RX ORDER — PANTOPRAZOLE SODIUM 20 MG/1
40 TABLET, DELAYED RELEASE ORAL
Refills: 0 | Status: DISCONTINUED | OUTPATIENT
Start: 2020-03-01 | End: 2020-03-08

## 2020-03-01 RX ORDER — FOLIC ACID 0.8 MG
1 TABLET ORAL DAILY
Refills: 0 | Status: DISCONTINUED | OUTPATIENT
Start: 2020-03-01 | End: 2020-03-08

## 2020-03-01 RX ORDER — FERROUS SULFATE 325(65) MG
325 TABLET ORAL DAILY
Refills: 0 | Status: DISCONTINUED | OUTPATIENT
Start: 2020-03-01 | End: 2020-03-08

## 2020-03-01 RX ORDER — ASPIRIN/CALCIUM CARB/MAGNESIUM 324 MG
1 TABLET ORAL
Qty: 0 | Refills: 0 | DISCHARGE

## 2020-03-01 RX ORDER — AMLODIPINE BESYLATE 2.5 MG/1
5 TABLET ORAL DAILY
Refills: 0 | Status: DISCONTINUED | OUTPATIENT
Start: 2020-03-01 | End: 2020-03-02

## 2020-03-01 RX ORDER — MAGNESIUM OXIDE 400 MG ORAL TABLET 241.3 MG
400 TABLET ORAL DAILY
Refills: 0 | Status: DISCONTINUED | OUTPATIENT
Start: 2020-03-01 | End: 2020-03-08

## 2020-03-01 RX ORDER — ONDANSETRON 8 MG/1
4 TABLET, FILM COATED ORAL EVERY 6 HOURS
Refills: 0 | Status: DISCONTINUED | OUTPATIENT
Start: 2020-03-01 | End: 2020-03-08

## 2020-03-01 RX ORDER — IPRATROPIUM/ALBUTEROL SULFATE 18-103MCG
3 AEROSOL WITH ADAPTER (GRAM) INHALATION ONCE
Refills: 0 | Status: COMPLETED | OUTPATIENT
Start: 2020-03-01 | End: 2020-03-01

## 2020-03-01 RX ORDER — AMIODARONE HYDROCHLORIDE 400 MG/1
1 TABLET ORAL
Qty: 0 | Refills: 0 | DISCHARGE

## 2020-03-01 RX ORDER — BENZOCAINE AND MENTHOL 5; 1 G/100ML; G/100ML
1 LIQUID ORAL EVERY 4 HOURS
Refills: 0 | Status: DISCONTINUED | OUTPATIENT
Start: 2020-03-01 | End: 2020-03-08

## 2020-03-01 RX ORDER — HYDRALAZINE HCL 50 MG
10 TABLET ORAL ONCE
Refills: 0 | Status: COMPLETED | OUTPATIENT
Start: 2020-03-01 | End: 2020-03-01

## 2020-03-01 RX ADMIN — AMLODIPINE BESYLATE 5 MILLIGRAM(S): 2.5 TABLET ORAL at 09:49

## 2020-03-01 RX ADMIN — Medication 100 MILLIGRAM(S): at 09:44

## 2020-03-01 RX ADMIN — Medication 50 MILLIGRAM(S): at 20:32

## 2020-03-01 RX ADMIN — Medication 1 TABLET(S): at 13:03

## 2020-03-01 RX ADMIN — OXYCODONE HYDROCHLORIDE 5 MILLIGRAM(S): 5 TABLET ORAL at 09:45

## 2020-03-01 RX ADMIN — Medication 81 MILLIGRAM(S): at 13:02

## 2020-03-01 RX ADMIN — SENNA PLUS 2 TABLET(S): 8.6 TABLET ORAL at 20:35

## 2020-03-01 RX ADMIN — Medication 10 MILLIGRAM(S): at 03:28

## 2020-03-01 RX ADMIN — LOSARTAN POTASSIUM 100 MILLIGRAM(S): 100 TABLET, FILM COATED ORAL at 09:44

## 2020-03-01 RX ADMIN — ONDANSETRON 4 MILLIGRAM(S): 8 TABLET, FILM COATED ORAL at 09:31

## 2020-03-01 RX ADMIN — OXYCODONE HYDROCHLORIDE 5 MILLIGRAM(S): 5 TABLET ORAL at 21:15

## 2020-03-01 RX ADMIN — CLOPIDOGREL BISULFATE 75 MILLIGRAM(S): 75 TABLET, FILM COATED ORAL at 13:03

## 2020-03-01 RX ADMIN — MAGNESIUM OXIDE 400 MG ORAL TABLET 400 MILLIGRAM(S): 241.3 TABLET ORAL at 14:58

## 2020-03-01 RX ADMIN — Medication 3 MILLILITER(S): at 18:49

## 2020-03-01 RX ADMIN — Medication 50 MILLIGRAM(S): at 14:59

## 2020-03-01 RX ADMIN — Medication 40 MILLIGRAM(S): at 06:12

## 2020-03-01 RX ADMIN — Medication 1 MILLIGRAM(S): at 13:02

## 2020-03-01 RX ADMIN — OXYCODONE HYDROCHLORIDE 5 MILLIGRAM(S): 5 TABLET ORAL at 20:32

## 2020-03-01 RX ADMIN — OXYCODONE HYDROCHLORIDE 5 MILLIGRAM(S): 5 TABLET ORAL at 10:30

## 2020-03-01 RX ADMIN — Medication 325 MILLIGRAM(S): at 13:02

## 2020-03-01 RX ADMIN — ATORVASTATIN CALCIUM 80 MILLIGRAM(S): 80 TABLET, FILM COATED ORAL at 20:32

## 2020-03-01 RX ADMIN — AMIODARONE HYDROCHLORIDE 200 MILLIGRAM(S): 400 TABLET ORAL at 09:49

## 2020-03-01 NOTE — H&P ADULT - NEUROLOGICAL DETAILS
alert and oriented x 3/sensation intact/responds to pain/responds to verbal commands/deep reflexes intact/cranial nerves intact/normal strength

## 2020-03-01 NOTE — CONSULT NOTE ADULT - PROBLEM SELECTOR RECOMMENDATION 2
BP has improved since presentation to the ED; continue amlodipine, hydralazine, losartan, and metoprolol -- will titrate regimen over next 24-48 hours as needed.

## 2020-03-01 NOTE — ED ADULT NURSE REASSESSMENT NOTE - NS ED NURSE REASSESS COMMENT FT1
Pt on stretcher, NAD noted.  Pt updated on/aware of POC. Pt needs met.  Safety maintained. Awaiting bed placement.

## 2020-03-01 NOTE — H&P ADULT - NSHPPHYSICALEXAM_GEN_ALL_CORE
Vital Signs Last 24 Hrs  T(C): 37 (01 Mar 2020 01:38), Max: 37 (01 Mar 2020 01:38)  T(F): 98.6 (01 Mar 2020 01:38), Max: 98.6 (01 Mar 2020 01:38)  HR: 79 (01 Mar 2020 01:38) (79 - 79)  BP: 190/44 (01 Mar 2020 01:38) (190/44 - 190/44)  RR: 18 (01 Mar 2020 01:38) (18 - 18)  SpO2: 93% (01 Mar 2020 01:38) (93% - 93%)

## 2020-03-01 NOTE — ED ADULT NURSE NOTE - OBJECTIVE STATEMENT
Pt on stretcher, alert and oriented x 3.  Pt c/o chest discomfort and SOB.  Pt respirations even and unlabored.  Abdomen soft, non tender.  Skin warm, dry, and appropriate color for age and race.  Pt denies any other complaints at this time.

## 2020-03-01 NOTE — H&P ADULT - HISTORY OF PRESENT ILLNESS
79 yo Female with a hx of anemia, CAD on plavix, MI, anemia, HTN, hyperlipidemia, lung cancer in remission s/p resection; atrial fibrillation (off eliquis for 1 month due to GI bleeding); GERD ; post op day 4 after aorto-femoral bypass surgery at [facility] now presenting with chest pressure intermittently, shortness of breath and wet cough.  Patient states she had URI upon admission to [facility] and was told she had humanmetapneumo virus.  Patient underwent surgery and was discharged yesterday afternoon to [facility].  Patient had swelling of legs post surgery and had dopplers done which were negative for DVT prior to discharge.  Patient denies fever, chills, back pain.  +soreness in groin bilaterally at incision sites 81 yo Female with a hx of anemia, CAD on plavix, MI, anemia, HTN, hyperlipidemia, lung cancer in remission s/p resection; atrial fibrillation (off eliquis for 1 month due to GI bleeding); GERD ; post op day 4 after aorto-femoral bypass surgery at [facility] now presenting with chest pressure intermittently with shortness of breath and wet cough.  Patient states she had URI upon admission to [facility] and was told she had humanmetapneumo virus.  Patient underwent surgery and was discharged yesterday afternoon to [facility].  Patient had swelling of legs post surgery and had dopplers done which were negative for DVT prior to discharge.  Patient denies fever, chills, back pain. No fever.

## 2020-03-01 NOTE — ED ADULT NURSE NOTE - NSIMPLEMENTINTERV_GEN_ALL_ED
Routing e-advice to , please advise.     Last OV: 5/24/19    No follow up schedule     Last refill of Adderall XR 25 mg hr capsule on 6/17/19   Implemented All Fall Risk Interventions:  [place] to call system. Call bell, personal items and telephone within reach. Instruct patient to call for assistance. Room bathroom lighting operational. Non-slip footwear when patient is off stretcher. Physically safe environment: no spills, clutter or unnecessary equipment. Stretcher in lowest position, wheels locked, appropriate side rails in place. Provide visual cue, wrist band, yellow gown, etc. Monitor gait and stability. Monitor for mental status changes and reorient to person, place, and time. Review medications for side effects contributing to fall risk. Reinforce activity limits and safety measures with patient and family.

## 2020-03-01 NOTE — CONSULT NOTE ADULT - PROBLEM SELECTOR RECOMMENDATION 9
Improved; likely related to uncontrolled HTN, small pleural effusion, COPD/empysema and exacerbated by recent respiratory infection.  She does not appear to be significantly volume overloaded but has low-normal LVEF and has pleural effusions and elevated proBNP -- short trial of diuresis with IV Lasix while monitoring renal function and serum potassium is reasonable.

## 2020-03-01 NOTE — H&P ADULT - NSICDXPASTMEDICALHX_GEN_ALL_CORE_FT
psychiatric evaluation PAST MEDICAL HISTORY:  Anemia     Atrial fibrillation     CAD (coronary artery disease)     HTN (hypertension)     Lung cancer     MI (myocardial infarction)     Peptic ulcer disease

## 2020-03-01 NOTE — H&P ADULT - ASSESSMENT
79 yo Female presented with shortness of breath.     A/P:    1.  HFpEF  CHF exacerbation  Shortness of breath  Chest pressure  Pleural effusion  CAD  -will give IV lasix daily for 3 days and then evaluate  -follow Is and Os  -daily weight  -follow clinically  -follow troponin  -continue aspirin, statin, plavix, bb, ARB    2.  HTN  -not controlled   -follow BP and adjust meds as needed    3.  HLD  -on statin    4.  SCD for DVT ppx

## 2020-03-01 NOTE — ED ADULT TRIAGE NOTE - CHIEF COMPLAINT QUOTE
Pt brought in by EMS, c/o chest pressure. Pt reports SOB x 3 days. +cough, denies fever, chills. Pt reports recent heart revascularization surgery at [facility], pt is set to have stent placement.

## 2020-03-01 NOTE — ED ADULT NURSE NOTE - CHIEF COMPLAINT QUOTE
Pt brought in by EMS, c/o chest pressure. Pt reports SOB x 3 days. +cough, denies fever, chills. Pt reports recent heart revascularization surgery at [facility], pt is set to have stent placement.

## 2020-03-01 NOTE — CONSULT NOTE ADULT - PROBLEM SELECTOR RECOMMENDATION 4
Currently in sinus rhythm; previously anticoagulated with Eliquis but no longer anticoagulated following GI hemorrhage in January.

## 2020-03-01 NOTE — CONSULT NOTE ADULT - SUBJECTIVE AND OBJECTIVE BOX
CHIEF COMPLAINT: Patient is a 80y old  Female who presents with a chief complaint of complain of shortness of breath, cough, high BP    HPI:  80 year old woman with a history of CAD, prior coronary stents with progression of CAD on recent coronary angiography, paroxysmal atrial fibrillation (off anticoagulation due to recent GI bleed), peripheral artery disease s/p recent surgical revascularization (aorto-femoral bypass surgery at [facility] last week), lung cancer, emphysema, who presented to the ER on 2/29/2020 for the evaluation of cough, dyspnea, and poorly-controlled HTN several hours after her arrival at a rehabilitation nursing facility.  She describes recent "bronchitis" that has progressively worsened over the past 1 week with cough and mild shortness of breath; unable to identify exacerbating or alleviating factors.  She was found to be hypertensive (190/44) in the ED and was diagnosed with an exacerbation of HFpEF and uncontrolled HTN.  She feels "better" at time of my encounter.  She denies angina.    PAST MEDICAL & SURGICAL HISTORY:  Anemia  Lung cancer  Peptic ulcer disease  Atrial fibrillation  MI (myocardial infarction)  CAD (coronary artery disease)  HTN (hypertension)  H/O aorto-femoral bypass    SOCIAL HISTORY:  Smoking: Former smoker    FAMILY HISTORY:   Family history of intracranial hemorrhage    MEDICATIONS  (STANDING):  albuterol/ipratropium for Nebulization 3 milliLiter(s) Nebulizer every 6 hours  aMIOdarone    Tablet 200 milliGRAM(s) Oral daily  amLODIPine   Tablet 5 milliGRAM(s) Oral daily  aspirin enteric coated 81 milliGRAM(s) Oral daily  atorvastatin 80 milliGRAM(s) Oral at bedtime  clopidogrel Tablet 75 milliGRAM(s) Oral daily  ferrous    sulfate 325 milliGRAM(s) Oral daily  folic acid 1 milliGRAM(s) Oral daily  furosemide   Injectable 40 milliGRAM(s) IV Push daily  hydrALAZINE 50 milliGRAM(s) Oral three times a day  losartan 100 milliGRAM(s) Oral daily  magnesium oxide 400 milliGRAM(s) Oral daily  metoprolol succinate  milliGRAM(s) Oral daily  multivitamin 1 Tablet(s) Oral daily  pantoprazole    Tablet 40 milliGRAM(s) Oral before breakfast  senna 2 Tablet(s) Oral at bedtime    MEDICATIONS  (PRN):  acetaminophen   Tablet .. 650 milliGRAM(s) Oral every 6 hours PRN Mild Pain (1 - 3)  benzocaine 15 mG/menthol 3.6 mG (Sugar-Free) Lozenge 1 Lozenge Oral every 4 hours PRN Sore Throat  guaiFENesin   Syrup  (Sugar-Free) 200 milliGRAM(s) Oral every 6 hours PRN Cough  ondansetron Injectable 4 milliGRAM(s) IV Push every 6 hours PRN Nausea and/or Vomiting  oxyCODONE    IR 5 milliGRAM(s) Oral daily PRN Moderate Pain (4 - 6)    Allergies:  predniSONE (Other (Moderate))    REVIEW OF SYSTEMS:  CONSTITUTIONAL: No  fevers or chills  Eyes: No visual changes  NECK: No pain or stiffness  RESPIRATORY: + cough, + shortness of breath  CARDIOVASCULAR: No chest pain or palpitations  GASTROINTESTINAL: No abdominal pain. No nausea, vomiting, or hematemesis; No diarrhea or constipation. No melena or hematochezia.  GENITOURINARY: No dysuria, frequency or hematuria  NEUROLOGICAL: No numbness.  SKIN: No itching or rash  All other review of systems is negative unless indicated above    VITAL SIGNS:   Vital Signs Last 24 Hrs  T(C): 36.5 (01 Mar 2020 10:42), Max: 37 (01 Mar 2020 01:38)  T(F): 97.7 (01 Mar 2020 10:42), Max: 98.6 (01 Mar 2020 01:38)  HR: 71 (01 Mar 2020 10:42) (67 - 87)  BP: 156/41 (01 Mar 2020 10:42) (156/41 - 190/44)  BP(mean): 91 (01 Mar 2020 07:17) (91 - 91)  RR: 18 (01 Mar 2020 10:42) (18 - 18)  SpO2: 95% (01 Mar 2020 07:17) (93% - 96%)    PHYSICAL EXAM:  Constitutional: NAD, awake and alert, appears comfortable and stated age  HEENT:  No oral cyanosis.  Pulmonary: Non-labored, breath sounds are clear bilaterally, No crackles  Cardiovascular: S1 and S2, regular rate and rhythm  Gastrointestinal: Bowel Sounds present, soft, nontender.   Lymph: No peripheral edema. No cervical lymphadenopathy.  Neurological: Alert, no focal deficits  Skin: No rashes.  Psych:  Mood & affect appropriate    LABS:                     10.3   15.40 )-----------( 398      ( 01 Mar 2020 07:53 )             32.0                      134    |  100    |  13     ----------------------------<  111    3.7     |  29     |  0.75     TPro  6.2    /  Alb  2.0    /  TBili  0.5    /  DBili  x      /  AST  26     /  ALT  27     /  AlkPhos  90     01 Mar 2020 02:52  PT/INR - ( 01 Mar 2020 02:52 )   PT: 12.5 sec;   INR: 1.12 ratio    PTT - ( 01 Mar 2020 02:52 )  PTT:26.8 sec    CARDIAC MARKERS ( 01 Mar 2020 07:53 ) 0.033 ng/mL / x     / x     / x     / x      CARDIAC MARKERS ( 01 Mar 2020 05:04 ) 0.040 ng/mL / x     / x     / x     / x      CARDIAC MARKERS ( 01 Mar 2020 02:52 ) 0.037 ng/mL / x     / 62 U/L / x     / x        Pro Bnp [id number]    CT Angio Chest PE Protocol w/ IV Cont (03.01.20 @ 04:18): No pulmonary embolus. A 1.2 x 1.0 cm nodular opacity in the left lower lobe is indeterminate. Consider correlation with PET/CT. Small bilateral pleural effusions.    Transthoracic Echocardiogram (01.06.20 @ 08:37):   The left ventricle is normal in size, wall thickness, wall motion and contractility.  Estimated left ventricular ejection fraction is 60-65 %.   The left atrium is mildly dilated.   Normal appearing right ventricle structure and function.   Fibrocalcific changes noted to the Aortic valve leaflets with preserved  leaflet excursion.   Mild mitral annular calcification is present.   The mitral valve leaflets appear thickened with mildly restricted leaflet excursion.   Moderate (2+) mitral regurgitation is present.   No evidence of pericardial effusion.    Cardiac Cath Lab - Adult (01.07.20 @ 11:15):   LMCA: Diffuse irregularity. There is mild diffuse disease noted.   Prox LAD: Distal subsection.70% stenosis    Mid CX: Mid subsection.85% stenosis    ProxR CA: Ostial.100% stenosis   Ramus: Diffuse irregularity.   EF 50% on ventriculography    Tele: Sinus rhythm    12 Lead ECG (03.01.20 @ 02:08):  Sinus rhythm with Premature atrial complexes. ST & T wave abnormality, consider lateral ischemia

## 2020-03-01 NOTE — ED PROVIDER NOTE - PROGRESS NOTE DETAILS
Recent hospitalization, cough, and low O2 discussed with patient.  Blood cultures and lactate sent and empiric antibiotics ordered for HCAP.  Patient states she was told URI was viral and refusing IV antibiotics at this time and will wait for results of CTA.

## 2020-03-01 NOTE — CONSULT NOTE ADULT - PROBLEM SELECTOR RECOMMENDATION 3
3V CAD on coronary angiography in January -- will discuss revascularization with Dr Obrien; normal cardiac biomarkers; continue aspirin, Plavix, metoprolol, atorvastatin.

## 2020-03-01 NOTE — ED PROVIDER NOTE - HEME LYMPH, MLM
No adenopathy or splenomegaly. No cervical or inguinal lymphadenopathy. No adenopathy or splenomegaly. No cervical or inguinal lymphadenopathy. Trace edema of bilateral feet and ankles

## 2020-03-01 NOTE — ED PROVIDER NOTE - OBJECTIVE STATEMENT
Pt. is an 79 yo F with a hx of anemia, CAD on plavix, MI, anemia, HTN, hyperlipidemia, lung cancer in remission s/p resection; atrial fibrillation (off eliquis for 1 month due to GI bleeding); GERD ; post op day 4 after aorto-femoral bypass surgery at [facility] now presenting with chest pressure intermittently, shortness of breath and wet cough.  Patient states she had URI upon admission to [facility] and was told she had humanmetapneumo virus.  Patient underwent surgery and was discharged yesterday afternoon to [facility].  Patient had swelling of legs post surgery and had dopplers done which were negative for DVT prior to discharge.  Patient denies fever, chills, back pain.  +soreness in groin bilaterally at incision sites.

## 2020-03-02 DIAGNOSIS — I50.43 ACUTE ON CHRONIC COMBINED SYSTOLIC (CONGESTIVE) AND DIASTOLIC (CONGESTIVE) HEART FAILURE: ICD-10-CM

## 2020-03-02 LAB
ANION GAP SERPL CALC-SCNC: 3 MMOL/L — LOW (ref 5–17)
BUN SERPL-MCNC: 13 MG/DL — SIGNIFICANT CHANGE UP (ref 7–23)
CALCIUM SERPL-MCNC: 8.1 MG/DL — LOW (ref 8.5–10.1)
CHLORIDE SERPL-SCNC: 100 MMOL/L — SIGNIFICANT CHANGE UP (ref 96–108)
CO2 SERPL-SCNC: 32 MMOL/L — HIGH (ref 22–31)
CREAT SERPL-MCNC: 0.81 MG/DL — SIGNIFICANT CHANGE UP (ref 0.5–1.3)
GLUCOSE SERPL-MCNC: 112 MG/DL — HIGH (ref 70–99)
HCT VFR BLD CALC: 28.1 % — LOW (ref 34.5–45)
HGB BLD-MCNC: 9.1 G/DL — LOW (ref 11.5–15.5)
MCHC RBC-ENTMCNC: 28.3 PG — SIGNIFICANT CHANGE UP (ref 27–34)
MCHC RBC-ENTMCNC: 32.4 GM/DL — SIGNIFICANT CHANGE UP (ref 32–36)
MCV RBC AUTO: 87.3 FL — SIGNIFICANT CHANGE UP (ref 80–100)
PLATELET # BLD AUTO: 364 K/UL — SIGNIFICANT CHANGE UP (ref 150–400)
POTASSIUM SERPL-MCNC: 3.3 MMOL/L — LOW (ref 3.5–5.3)
POTASSIUM SERPL-SCNC: 3.3 MMOL/L — LOW (ref 3.5–5.3)
RBC # BLD: 3.22 M/UL — LOW (ref 3.8–5.2)
RBC # FLD: 16.2 % — HIGH (ref 10.3–14.5)
SODIUM SERPL-SCNC: 135 MMOL/L — SIGNIFICANT CHANGE UP (ref 135–145)
WBC # BLD: 9.39 K/UL — SIGNIFICANT CHANGE UP (ref 3.8–10.5)
WBC # FLD AUTO: 9.39 K/UL — SIGNIFICANT CHANGE UP (ref 3.8–10.5)

## 2020-03-02 PROCEDURE — 99233 SBSQ HOSP IP/OBS HIGH 50: CPT

## 2020-03-02 RX ORDER — AMLODIPINE BESYLATE 2.5 MG/1
5 TABLET ORAL
Refills: 0 | Status: DISCONTINUED | OUTPATIENT
Start: 2020-03-02 | End: 2020-03-08

## 2020-03-02 RX ORDER — AMLODIPINE BESYLATE 2.5 MG/1
5 TABLET ORAL
Refills: 0 | Status: DISCONTINUED | OUTPATIENT
Start: 2020-03-02 | End: 2020-03-02

## 2020-03-02 RX ORDER — POTASSIUM CHLORIDE 20 MEQ
40 PACKET (EA) ORAL EVERY 4 HOURS
Refills: 0 | Status: COMPLETED | OUTPATIENT
Start: 2020-03-02 | End: 2020-03-02

## 2020-03-02 RX ORDER — HYDRALAZINE HCL 50 MG
25 TABLET ORAL
Refills: 0 | Status: DISCONTINUED | OUTPATIENT
Start: 2020-03-02 | End: 2020-03-05

## 2020-03-02 RX ORDER — FUROSEMIDE 40 MG
20 TABLET ORAL ONCE
Refills: 0 | Status: COMPLETED | OUTPATIENT
Start: 2020-03-02 | End: 2020-03-02

## 2020-03-02 RX ADMIN — OXYCODONE HYDROCHLORIDE 5 MILLIGRAM(S): 5 TABLET ORAL at 19:30

## 2020-03-02 RX ADMIN — OXYCODONE HYDROCHLORIDE 5 MILLIGRAM(S): 5 TABLET ORAL at 19:16

## 2020-03-02 RX ADMIN — MAGNESIUM OXIDE 400 MG ORAL TABLET 400 MILLIGRAM(S): 241.3 TABLET ORAL at 12:10

## 2020-03-02 RX ADMIN — Medication 650 MILLIGRAM(S): at 09:08

## 2020-03-02 RX ADMIN — Medication 650 MILLIGRAM(S): at 20:24

## 2020-03-02 RX ADMIN — Medication 1 TABLET(S): at 12:09

## 2020-03-02 RX ADMIN — AMLODIPINE BESYLATE 5 MILLIGRAM(S): 2.5 TABLET ORAL at 19:16

## 2020-03-02 RX ADMIN — Medication 25 MILLIGRAM(S): at 19:16

## 2020-03-02 RX ADMIN — AMLODIPINE BESYLATE 5 MILLIGRAM(S): 2.5 TABLET ORAL at 06:03

## 2020-03-02 RX ADMIN — Medication 81 MILLIGRAM(S): at 12:09

## 2020-03-02 RX ADMIN — Medication 3 MILLILITER(S): at 20:16

## 2020-03-02 RX ADMIN — Medication 50 MILLIGRAM(S): at 06:03

## 2020-03-02 RX ADMIN — AMIODARONE HYDROCHLORIDE 200 MILLIGRAM(S): 400 TABLET ORAL at 06:03

## 2020-03-02 RX ADMIN — Medication 40 MILLIEQUIVALENT(S): at 12:09

## 2020-03-02 RX ADMIN — PANTOPRAZOLE SODIUM 40 MILLIGRAM(S): 20 TABLET, DELAYED RELEASE ORAL at 06:03

## 2020-03-02 RX ADMIN — Medication 1 MILLIGRAM(S): at 12:09

## 2020-03-02 RX ADMIN — Medication 100 MILLIGRAM(S): at 06:03

## 2020-03-02 RX ADMIN — LOSARTAN POTASSIUM 100 MILLIGRAM(S): 100 TABLET, FILM COATED ORAL at 06:03

## 2020-03-02 RX ADMIN — Medication 3 MILLILITER(S): at 15:11

## 2020-03-02 RX ADMIN — Medication 200 MILLIGRAM(S): at 19:17

## 2020-03-02 RX ADMIN — CLOPIDOGREL BISULFATE 75 MILLIGRAM(S): 75 TABLET, FILM COATED ORAL at 12:09

## 2020-03-02 RX ADMIN — Medication 20 MILLIGRAM(S): at 23:04

## 2020-03-02 RX ADMIN — Medication 325 MILLIGRAM(S): at 12:09

## 2020-03-02 RX ADMIN — Medication 3 MILLILITER(S): at 01:57

## 2020-03-02 RX ADMIN — Medication 3 MILLILITER(S): at 07:47

## 2020-03-02 RX ADMIN — Medication 40 MILLIEQUIVALENT(S): at 15:03

## 2020-03-02 RX ADMIN — Medication 40 MILLIGRAM(S): at 06:03

## 2020-03-02 RX ADMIN — ATORVASTATIN CALCIUM 80 MILLIGRAM(S): 80 TABLET, FILM COATED ORAL at 21:54

## 2020-03-02 NOTE — PROGRESS NOTE ADULT - PROBLEM SELECTOR PLAN 5
in NSR today. Off AC due to Gi bleeding recurrent and on asa and Plavix for now. will likely need watchman down the line.

## 2020-03-02 NOTE — PROGRESS NOTE ADULT - SUBJECTIVE AND OBJECTIVE BOX
81 yo Female with a hx of anemia, CAD on plavix, MI, anemia, HTN, hyperlipidemia, lung cancer in remission s/p resection; atrial fibrillation (off eliquis for 1 month due to GI bleeding); GERD ; post op day 4 after aorto-femoral bypass surgery at [facility] now presenting with chest pressure intermittently with shortness of breath and wet cough.  Patient states she had URI upon admission to [facility] and was told she had humanmetapneumo virus.  Patient underwent surgery and was discharged yesterday afternoon to [facility].  Patient had swelling of legs post surgery and had dopplers done which were negative for DVT prior to discharge.  Patient denies fever, chills, back pain. No fever.       03/02/20: Patient seen and examined. SOB improving, diuresing well. Discussed with patient regarding management and d/c plan.         Vital Signs Last 24 Hrs  T(C): 36.6 (02 Mar 2020 10:47), Max: 36.7 (01 Mar 2020 20:00)  T(F): 97.9 (02 Mar 2020 10:47), Max: 98.1 (01 Mar 2020 20:00)  HR: 81 (02 Mar 2020 15:13) (64 - 81)  BP: 143/33 (02 Mar 2020 14:59) (127/34 - 177/43)  BP(mean): --  RR: 18 (02 Mar 2020 10:47) (18 - 18)  SpO2: 95% (02 Mar 2020 10:47) (95% - 98%)      Physical Exam:       · ENMT: Airway patent, Nasal mucosa clear. Mouth with dry mucosa. Throat has no vesicles, no oropharyngeal exudates and uvula is midline.	  · EYES: Clear bilaterally, pupils equal, round and reactive to light.	  · CARDIAC: Normal rate, regular rhythm.  Heart sounds S1, S2.  No murmurs, rubs or gallops.	  · RESPIRATORY: Diminished breath sounds in bilateral bases; coarse upper airway sounds.	  · GASTROINTESTINAL: Abdomen soft, non-tender, no guarding.	  · MUSCULOSKELETAL: Spine appears normal, range of motion is not limited, no muscle or joint tenderness	  · NEUROLOGICAL: Alert and oriented, no focal deficits, no motor or sensory deficits.	  · SKIN: Skin slightly pale; incisions in groin with sutures, clean dry intact and nontender without erythema.	  · PSYCHIATRIC: Alert and oriented to person, place, time/situation. flat affect	  · HEME LYMPH: No adenopathy or splenomegaly. No cervical or inguinal lymphadenopathy. Trace edema of bilateral feet and ankles	                                    9.1    9.39  )-----------( 364      ( 02 Mar 2020 07:14 )             28.1     02 Mar 2020 07:14    135    |  100    |  13     ----------------------------<  112    3.3     |  32     |  0.81     Ca    8.1        02 Mar 2020 07:14  Mg     2.0       01 Mar 2020 02:52    TPro  6.2    /  Alb  2.0    /  TBili  0.5    /  DBili  x      /  AST  26     /  ALT  27     /  AlkPhos  90     01 Mar 2020 02:52    LIVER FUNCTIONS - ( 01 Mar 2020 02:52 )  Alb: 2.0 g/dL / Pro: 6.2 gm/dL / ALK PHOS: 90 U/L / ALT: 27 U/L / AST: 26 U/L / GGT: x           PT/INR - ( 01 Mar 2020 02:52 )   PT: 12.5 sec;   INR: 1.12 ratio         PTT - ( 01 Mar 2020 02:52 )  PTT:26.8 sec  CAPILLARY BLOOD GLUCOSE        CARDIAC MARKERS ( 01 Mar 2020 07:53 )  0.033 ng/mL / x     / x     / x     / x      CARDIAC MARKERS ( 01 Mar 2020 05:04 )  0.040 ng/mL / x     / x     / x     / x      CARDIAC MARKERS ( 01 Mar 2020 02:52 )  0.037 ng/mL / x     / 62 U/L / x     / x                    MEDICATIONS  (STANDING):  albuterol/ipratropium for Nebulization 3 milliLiter(s) Nebulizer every 6 hours  aMIOdarone    Tablet 200 milliGRAM(s) Oral daily  amLODIPine   Tablet 5 milliGRAM(s) Oral <User Schedule>  aspirin enteric coated 81 milliGRAM(s) Oral daily  atorvastatin 80 milliGRAM(s) Oral at bedtime  clopidogrel Tablet 75 milliGRAM(s) Oral daily  ferrous    sulfate 325 milliGRAM(s) Oral daily  folic acid 1 milliGRAM(s) Oral daily  furosemide   Injectable 40 milliGRAM(s) IV Push daily  hydrALAZINE 50 milliGRAM(s) Oral three times a day  losartan 100 milliGRAM(s) Oral daily  magnesium oxide 400 milliGRAM(s) Oral daily  metoprolol succinate  milliGRAM(s) Oral daily  multivitamin 1 Tablet(s) Oral daily  pantoprazole    Tablet 40 milliGRAM(s) Oral before breakfast  senna 2 Tablet(s) Oral at bedtime    MEDICATIONS  (PRN):  acetaminophen   Tablet .. 650 milliGRAM(s) Oral every 6 hours PRN Mild Pain (1 - 3)  benzocaine 15 mG/menthol 3.6 mG (Sugar-Free) Lozenge 1 Lozenge Oral every 4 hours PRN Sore Throat  guaiFENesin   Syrup  (Sugar-Free) 200 milliGRAM(s) Oral every 6 hours PRN Cough  ondansetron Injectable 4 milliGRAM(s) IV Push every 6 hours PRN Nausea and/or Vomiting  oxyCODONE    IR 5 milliGRAM(s) Oral every 8 hours PRN Moderate Pain (4 - 6)            Assessment and Plan:   Assessment:  · Assessment		  81 yo Female presented with shortness of breath.     A/P:    1.  HFpEF  Acute diastolic CHF exacerbation  Pleural effusion  CAD  -Continue IV lasix daily for total  of 3 days and then evaluate  -follow Is and Os  -daily weight  -follow clinically  -Dr Obrien follow up appreciated  -continue aspirin, statin, plavix, bb, ARB    2.  HTN  -follow BP and adjust meds as needed    3.  HLD  -on statin    4.  Hypokalemia due to lasix  Replacing  Follow

## 2020-03-02 NOTE — PROGRESS NOTE ADULT - PROBLEM SELECTOR PLAN 4
Stable currently. Likely contributing as poor reserve given unrevascularized CAD.  Will need to deal with subsequently once better (timing is only question).

## 2020-03-02 NOTE — PROGRESS NOTE ADULT - SUBJECTIVE AND OBJECTIVE BOX
CHIEF COMPLAINT: Patient is a 80y old  Female who presents with a chief complaint of complain of shortness of breath, cough, high BP    HPI:  80 year old woman with a history of CAD, prior coronary stents with progression of CAD on recent coronary angiography, paroxysmal atrial fibrillation (off anticoagulation due to recent GI bleed), peripheral artery disease s/p recent surgical revascularization (aorto-femoral bypass surgery at [facility] last week), lung cancer, emphysema, who presented to the ER on 2020 for the evaluation of cough, dyspnea, and poorly-controlled HTN several hours after her arrival at a rehabilitation nursing facility.  She describes recent "bronchitis" that has progressively worsened over the past 1 week with cough and mild shortness of breath; unable to identify exacerbating or alleviating factors.  She was found to be hypertensive (190/44) in the ED and was diagnosed with an exacerbation of HFpEF and uncontrolled HTN.  She feels "better" at time of my encounter.  She denies angina.    3/2/2020: Breathing better.  No chest pain. Picture seems c/w Mild CHF and bronchitis on top of COPD combo.      PAST MEDICAL & SURGICAL HISTORY:  Anemia  Lung cancer  Peptic ulcer disease  Atrial fibrillation  MI (myocardial infarction)  CAD (coronary artery disease)  HTN (hypertension)  H/O aorto-femoral bypass    SOCIAL HISTORY:  Smoking: Former smoker    FAMILY HISTORY:   Family history of intracranial hemorrhage    MEDICATIONS  (STANDING):  albuterol/ipratropium for Nebulization 3 milliLiter(s) Nebulizer every 6 hours  aMIOdarone    Tablet 200 milliGRAM(s) Oral daily  amLODIPine   Tablet 5 milliGRAM(s) Oral daily  aspirin enteric coated 81 milliGRAM(s) Oral daily  atorvastatin 80 milliGRAM(s) Oral at bedtime  clopidogrel Tablet 75 milliGRAM(s) Oral daily  ferrous    sulfate 325 milliGRAM(s) Oral daily  folic acid 1 milliGRAM(s) Oral daily  furosemide   Injectable 40 milliGRAM(s) IV Push daily  hydrALAZINE 50 milliGRAM(s) Oral three times a day  losartan 100 milliGRAM(s) Oral daily  magnesium oxide 400 milliGRAM(s) Oral daily  metoprolol succinate  milliGRAM(s) Oral daily  multivitamin 1 Tablet(s) Oral daily  pantoprazole    Tablet 40 milliGRAM(s) Oral before breakfast  potassium chloride    Tablet ER 40 milliEquivalent(s) Oral every 4 hours  senna 2 Tablet(s) Oral at bedtime    MEDICATIONS  (PRN):  acetaminophen   Tablet .. 650 milliGRAM(s) Oral every 6 hours PRN Mild Pain (1 - 3)  benzocaine 15 mG/menthol 3.6 mG (Sugar-Free) Lozenge 1 Lozenge Oral every 4 hours PRN Sore Throat  guaiFENesin   Syrup  (Sugar-Free) 200 milliGRAM(s) Oral every 6 hours PRN Cough  ondansetron Injectable 4 milliGRAM(s) IV Push every 6 hours PRN Nausea and/or Vomiting  oxyCODONE    IR 5 milliGRAM(s) Oral every 8 hours PRN Moderate Pain (4 - 6)      Vital Signs Last 24 Hrs  T(C): 36.6 (02 Mar 2020 10:47), Max: 36.7 (01 Mar 2020 20:00)  T(F): 97.9 (02 Mar 2020 10:47), Max: 98.1 (01 Mar 2020 20:00)  HR: 80 (02 Mar 2020 10:47) (64 - 80)  BP: 127/34 (02 Mar 2020 10:47) (127/34 - 177/43)  BP(mean): --  RR: 18 (02 Mar 2020 10:47) (18 - 18)  SpO2: 95% (02 Mar 2020 10:47) (95% - 98%)    I&O's Detail    01 Mar 2020 07:01  -  02 Mar 2020 07:00  --------------------------------------------------------  IN:  Total IN: 0 mL    OUT:    Voided: 700 mL  Total OUT: 700 mL    Total NET: -700 mL    Daily     Daily Weight in k.8 (02 Mar 2020 05:43)    Allergies:  predniSONE (Other (Moderate))      PHYSICAL EXAM:  Constitutional: NAD, awake and alert, appears comfortable and stated age  HEENT:  No oral cyanosis.  Pulmonary: Non-labored, breath sounds are clear bilaterally, No crackles  Cardiovascular: S1 and S2, regular rate and rhythm  Gastrointestinal: Bowel Sounds present, soft, nontender.   Lymph: No peripheral edema. No cervical lymphadenopathy.  Neurological: Alert, no focal deficits  Skin: No rashes.  Psych:  Mood & affect appropriate  Groin region with well healed wounds from recent vascular surgery.    LABS:                                 9.1    9.39  )-----------( 364      ( 02 Mar 2020 07:14 )             28.1     03-02    135  |  100  |  13  ----------------------------<  112<H>  3.3<L>   |  32<H>  |  0.81    Ca    8.1<L>      02 Mar 2020 07:14  Mg     2.0     03-01    TPro  6.2  /  Alb  2.0<L>  /  TBili  0.5  /  DBili  x   /  AST  26  /  ALT  27  /  AlkPhos  90  03-01    CARDIAC MARKERS ( 01 Mar 2020 07:53 )  0.033 ng/mL / x     / x     / x     / x      CARDIAC MARKERS ( 01 Mar 2020 05:04 )  0.040 ng/mL / x     / x     / x     / x      CARDIAC MARKERS ( 01 Mar 2020 02:52 )  0.037 ng/mL / x     / 62 U/L / x     / x        LIVER FUNCTIONS - ( 01 Mar 2020 02:52 )  Alb: 2.0 g/dL / Pro: 6.2 gm/dL / ALK PHOS: 90 U/L / ALT: 27 U/L / AST: 26 U/L / GGT: x           PT/INR - ( 01 Mar 2020 02:52 )   PT: 12.5 sec;   INR: 1.12 ratio         PTT - ( 01 Mar 2020 02:52 )  PTT:26.8 sec      Pro Bnp [id number]    CT Angio Chest PE Protocol w/ IV Cont (20 @ 04:18): No pulmonary embolus. A 1.2 x 1.0 cm nodular opacity in the left lower lobe is indeterminate. Consider correlation with PET/CT. Small bilateral pleural effusions.    Transthoracic Echocardiogram (20 @ 08:37):   The left ventricle is normal in size, wall thickness, wall motion and contractility.  Estimated left ventricular ejection fraction is 60-65 %.   The left atrium is mildly dilated.   Normal appearing right ventricle structure and function.   Fibrocalcific changes noted to the Aortic valve leaflets with preserved  leaflet excursion.   Mild mitral annular calcification is present.   The mitral valve leaflets appear thickened with mildly restricted leaflet excursion.   Moderate (2+) mitral regurgitation is present.   No evidence of pericardial effusion.    Cardiac Cath Lab - Adult (20 @ 11:15):   LMCA: Diffuse irregularity. There is mild diffuse disease noted.   Prox LAD: Distal subsection.70% stenosis    Mid CX: Mid subsection.85% stenosis    ProxR CA: Ostial.100% stenosis   Ramus: Diffuse irregularity.   EF 50% on ventriculography    Tele: Sinus rhythm    12 Lead ECG (20 @ 02:08):  Sinus rhythm with Premature atrial complexes. ST & T wave abnormality, consider lateral ischemia

## 2020-03-03 LAB
ANION GAP SERPL CALC-SCNC: 2 MMOL/L — LOW (ref 5–17)
BUN SERPL-MCNC: 12 MG/DL — SIGNIFICANT CHANGE UP (ref 7–23)
CALCIUM SERPL-MCNC: 8.2 MG/DL — LOW (ref 8.5–10.1)
CHLORIDE SERPL-SCNC: 99 MMOL/L — SIGNIFICANT CHANGE UP (ref 96–108)
CO2 SERPL-SCNC: 33 MMOL/L — HIGH (ref 22–31)
CREAT SERPL-MCNC: 0.88 MG/DL — SIGNIFICANT CHANGE UP (ref 0.5–1.3)
GLUCOSE SERPL-MCNC: 102 MG/DL — HIGH (ref 70–99)
MAGNESIUM SERPL-MCNC: 2 MG/DL — SIGNIFICANT CHANGE UP (ref 1.6–2.6)
POTASSIUM SERPL-MCNC: 4.1 MMOL/L — SIGNIFICANT CHANGE UP (ref 3.5–5.3)
POTASSIUM SERPL-SCNC: 4.1 MMOL/L — SIGNIFICANT CHANGE UP (ref 3.5–5.3)
SODIUM SERPL-SCNC: 134 MMOL/L — LOW (ref 135–145)

## 2020-03-03 PROCEDURE — 99232 SBSQ HOSP IP/OBS MODERATE 35: CPT

## 2020-03-03 PROCEDURE — 99233 SBSQ HOSP IP/OBS HIGH 50: CPT

## 2020-03-03 RX ORDER — POTASSIUM CHLORIDE 20 MEQ
10 PACKET (EA) ORAL DAILY
Refills: 0 | Status: DISCONTINUED | OUTPATIENT
Start: 2020-03-04 | End: 2020-03-08

## 2020-03-03 RX ORDER — FUROSEMIDE 40 MG
20 TABLET ORAL ONCE
Refills: 0 | Status: COMPLETED | OUTPATIENT
Start: 2020-03-03 | End: 2020-03-03

## 2020-03-03 RX ORDER — POTASSIUM CHLORIDE 20 MEQ
40 PACKET (EA) ORAL ONCE
Refills: 0 | Status: COMPLETED | OUTPATIENT
Start: 2020-03-03 | End: 2020-03-03

## 2020-03-03 RX ORDER — FUROSEMIDE 40 MG
20 TABLET ORAL DAILY
Refills: 0 | Status: DISCONTINUED | OUTPATIENT
Start: 2020-03-04 | End: 2020-03-04

## 2020-03-03 RX ADMIN — Medication 25 MILLIGRAM(S): at 05:12

## 2020-03-03 RX ADMIN — Medication 3 MILLILITER(S): at 02:16

## 2020-03-03 RX ADMIN — MAGNESIUM OXIDE 400 MG ORAL TABLET 400 MILLIGRAM(S): 241.3 TABLET ORAL at 12:55

## 2020-03-03 RX ADMIN — AMLODIPINE BESYLATE 5 MILLIGRAM(S): 2.5 TABLET ORAL at 09:01

## 2020-03-03 RX ADMIN — Medication 3 MILLILITER(S): at 20:04

## 2020-03-03 RX ADMIN — OXYCODONE HYDROCHLORIDE 5 MILLIGRAM(S): 5 TABLET ORAL at 06:30

## 2020-03-03 RX ADMIN — Medication 3 MILLILITER(S): at 14:02

## 2020-03-03 RX ADMIN — Medication 40 MILLIGRAM(S): at 05:12

## 2020-03-03 RX ADMIN — PANTOPRAZOLE SODIUM 40 MILLIGRAM(S): 20 TABLET, DELAYED RELEASE ORAL at 05:15

## 2020-03-03 RX ADMIN — LOSARTAN POTASSIUM 100 MILLIGRAM(S): 100 TABLET, FILM COATED ORAL at 05:12

## 2020-03-03 RX ADMIN — SENNA PLUS 2 TABLET(S): 8.6 TABLET ORAL at 22:05

## 2020-03-03 RX ADMIN — Medication 20 MILLIGRAM(S): at 14:18

## 2020-03-03 RX ADMIN — Medication 325 MILLIGRAM(S): at 12:55

## 2020-03-03 RX ADMIN — Medication 200 MILLIGRAM(S): at 09:01

## 2020-03-03 RX ADMIN — Medication 40 MILLIEQUIVALENT(S): at 15:28

## 2020-03-03 RX ADMIN — Medication 81 MILLIGRAM(S): at 12:55

## 2020-03-03 RX ADMIN — Medication 100 MILLIGRAM(S): at 05:12

## 2020-03-03 RX ADMIN — Medication 25 MILLIGRAM(S): at 18:13

## 2020-03-03 RX ADMIN — Medication 200 MILLIGRAM(S): at 18:13

## 2020-03-03 RX ADMIN — CLOPIDOGREL BISULFATE 75 MILLIGRAM(S): 75 TABLET, FILM COATED ORAL at 12:55

## 2020-03-03 RX ADMIN — Medication 1 MILLIGRAM(S): at 12:55

## 2020-03-03 RX ADMIN — OXYCODONE HYDROCHLORIDE 5 MILLIGRAM(S): 5 TABLET ORAL at 18:13

## 2020-03-03 RX ADMIN — ATORVASTATIN CALCIUM 80 MILLIGRAM(S): 80 TABLET, FILM COATED ORAL at 22:05

## 2020-03-03 RX ADMIN — OXYCODONE HYDROCHLORIDE 5 MILLIGRAM(S): 5 TABLET ORAL at 05:11

## 2020-03-03 RX ADMIN — AMIODARONE HYDROCHLORIDE 200 MILLIGRAM(S): 400 TABLET ORAL at 05:12

## 2020-03-03 RX ADMIN — Medication 1 TABLET(S): at 12:55

## 2020-03-03 RX ADMIN — AMLODIPINE BESYLATE 5 MILLIGRAM(S): 2.5 TABLET ORAL at 19:47

## 2020-03-03 NOTE — PROGRESS NOTE ADULT - PROBLEM SELECTOR PLAN 1
From a combination of COPD/bronchitis and acute on chronic systolic/diastolic CHF.  Better this AM but required extra lasix last PM.

## 2020-03-03 NOTE — PHYSICAL THERAPY INITIAL EVALUATION ADULT - CRITERIA FOR SKILLED THERAPEUTIC INTERVENTIONS
risk reduction/prevention/impairments found/functional limitations in following categories/predicted duration of therapy intervention/anticipated equipment needs at discharge/therapy frequency/anticipated discharge recommendation/rehab potential

## 2020-03-03 NOTE — PROGRESS NOTE ADULT - PROBLEM SELECTOR PLAN 2
BP still slightly high.  Medication adjustment yesterday starting this AM.  Will given one more day of IV lasix and then switch to PO.

## 2020-03-03 NOTE — PHYSICAL THERAPY INITIAL EVALUATION ADULT - PERTINENT HX OF CURRENT PROBLEM, REHAB EVAL
80yoF s/p aorto-femoral bypass ss at [facility], D/C 4 days postop to [facility] rehab when she p/w SOB, cough, HTN and LE edema and brought to . Pt reports previous admission in Jan to  for cath, determined to req multple stents, however GI bleed due to eliquis and needed 2 blood transfusions. Then afib and palpitations so went to [facility] for cath/stents, but unable to access and needed aorto-fem bypass, will eventually need stents once recovered.

## 2020-03-03 NOTE — PROGRESS NOTE ADULT - SUBJECTIVE AND OBJECTIVE BOX
79 yo Female with a hx of anemia, CAD on plavix, MI, anemia, HTN, hyperlipidemia, lung cancer in remission s/p resection; atrial fibrillation (off eliquis for 1 month due to GI bleeding); GERD ; post op day 4 after aorto-femoral bypass surgery at [facility] now presenting with chest pressure intermittently with shortness of breath and wet cough.  Patient states she had URI upon admission to [facility] and was told she had humanmetapneumo virus.  Patient underwent surgery and was discharged yesterday afternoon to [facility].  Patient had swelling of legs post surgery and had dopplers done which were negative for DVT prior to discharge.  Patient denies fever, chills, back pain. No fever.       03/02/20: Patient seen and examined. SOB improving, diuresing well. Discussed with patient regarding management and d/c plan.   03/03/20: Patient seen and examined. SOB improving. Feels weak and tired.         Vital Signs Last 24 Hrs  T(C): 36.9 (03 Mar 2020 11:08), Max: 36.9 (02 Mar 2020 16:27)  T(F): 98.4 (03 Mar 2020 11:08), Max: 98.5 (03 Mar 2020 05:16)  HR: 55 (03 Mar 2020 12:49) (55 - 80)  BP: 141/32 (03 Mar 2020 12:49) (141/32 - 171/36)  BP(mean): --  RR: 16 (03 Mar 2020 05:16) (16 - 17)  SpO2: 96% (03 Mar 2020 12:49) (95% - 99%)      Physical Exam:       · ENMT: Airway patent, Nasal mucosa clear. Mouth with dry mucosa. Throat has no vesicles, no oropharyngeal exudates and uvula is midline.	  · EYES: Clear bilaterally, pupils equal, round and reactive to light.	  · CARDIAC: Normal rate, regular rhythm.  Heart sounds S1, S2.  No murmurs, rubs or gallops.	  · RESPIRATORY: Diminished breath sounds in bilateral bases; coarse upper airway sounds.	  · GASTROINTESTINAL: Abdomen soft, non-tender, no guarding.	  · MUSCULOSKELETAL: Spine appears normal, range of motion is not limited, no muscle or joint tenderness	  · NEUROLOGICAL: Alert and oriented, no focal deficits, no motor or sensory deficits.	  · SKIN: Skin slightly pale; incisions in groin with sutures, clean dry intact and nontender without erythema.	  · PSYCHIATRIC: Alert and oriented to person, place, time/situation. flat affect	  · HEME LYMPH: No adenopathy or splenomegaly. No cervical or inguinal lymphadenopathy. Trace edema of bilateral feet and ankles	                              9.1    9.39  )-----------( 364      ( 02 Mar 2020 07:14 )             28.1     03 Mar 2020 08:04    134    |  99     |  12     ----------------------------<  102    4.1     |  33     |  0.88     Ca    8.2        03 Mar 2020 08:04  Mg     2.0       03 Mar 2020 08:04          MEDICATIONS  (STANDING):  albuterol/ipratropium for Nebulization 3 milliLiter(s) Nebulizer every 6 hours  aMIOdarone    Tablet 200 milliGRAM(s) Oral daily  amLODIPine   Tablet 5 milliGRAM(s) Oral <User Schedule>  aspirin enteric coated 81 milliGRAM(s) Oral daily  atorvastatin 80 milliGRAM(s) Oral at bedtime  clopidogrel Tablet 75 milliGRAM(s) Oral daily  ferrous    sulfate 325 milliGRAM(s) Oral daily  folic acid 1 milliGRAM(s) Oral daily  furosemide   Injectable 40 milliGRAM(s) IV Push daily  hydrALAZINE 50 milliGRAM(s) Oral three times a day  losartan 100 milliGRAM(s) Oral daily  magnesium oxide 400 milliGRAM(s) Oral daily  metoprolol succinate  milliGRAM(s) Oral daily  multivitamin 1 Tablet(s) Oral daily  pantoprazole    Tablet 40 milliGRAM(s) Oral before breakfast  senna 2 Tablet(s) Oral at bedtime    MEDICATIONS  (PRN):  acetaminophen   Tablet .. 650 milliGRAM(s) Oral every 6 hours PRN Mild Pain (1 - 3)  benzocaine 15 mG/menthol 3.6 mG (Sugar-Free) Lozenge 1 Lozenge Oral every 4 hours PRN Sore Throat  guaiFENesin   Syrup  (Sugar-Free) 200 milliGRAM(s) Oral every 6 hours PRN Cough  ondansetron Injectable 4 milliGRAM(s) IV Push every 6 hours PRN Nausea and/or Vomiting  oxyCODONE    IR 5 milliGRAM(s) Oral every 8 hours PRN Moderate Pain (4 - 6)            Assessment and Plan:   Assessment:  · Assessment		  79 yo Female presented with shortness of breath.     A/P:    1.  Acute diastolic CHF exacerbation  Pleural effusion  CAD  -Continue IV lasix   -follow Is and Os  -daily weight  -follow clinically  -Dr Obrien follow up appreciated  -continue aspirin, statin, plavix, bb, ARB    2.  HTN  -follow BP and adjust meds as needed    3.  HLD  -on statin    4.  Hypokalemia due to lasix  Replaced, resolved  Follow    Disposition: PT eval pending

## 2020-03-03 NOTE — PROGRESS NOTE ADULT - SUBJECTIVE AND OBJECTIVE BOX
CHIEF COMPLAINT: Patient is a 80y old  Female who presents with a chief complaint of complain of shortness of breath, cough, high BP    HPI:  80 year old woman with a history of CAD, prior coronary stents with progression of CAD on recent coronary angiography, paroxysmal atrial fibrillation (off anticoagulation due to recent GI bleed), peripheral artery disease s/p recent surgical revascularization (aorto-femoral bypass surgery at [facility] last week), lung cancer, emphysema, who presented to the ER on 2020 for the evaluation of cough, dyspnea, and poorly-controlled HTN several hours after her arrival at a rehabilitation nursing facility.  She describes recent "bronchitis" that has progressively worsened over the past 1 week with cough and mild shortness of breath; unable to identify exacerbating or alleviating factors.  She was found to be hypertensive (190/44) in the ED and was diagnosed with an exacerbation of HFpEF and uncontrolled HTN.  She feels "better" at time of my encounter.  She denies angina.    3/2/2020: Breathing better.  No chest pain. Picture seems c/w Mild CHF and bronchitis on top of COPD combo.    3/3/2020: Had mild SOB last night that responded to IV lasix.  Feels better this AM. BP better. OOB this AM.     PAST MEDICAL & SURGICAL HISTORY:  Anemia  Lung cancer  Peptic ulcer disease  Atrial fibrillation  MI (myocardial infarction)  CAD (coronary artery disease)  HTN (hypertension)  H/O aorto-femoral bypass    SOCIAL HISTORY:  Smoking: Former smoker    FAMILY HISTORY:   Family history of intracranial hemorrhage      MEDICATIONS  (STANDING):  albuterol/ipratropium for Nebulization 3 milliLiter(s) Nebulizer every 6 hours  aMIOdarone    Tablet 200 milliGRAM(s) Oral daily  amLODIPine   Tablet 5 milliGRAM(s) Oral <User Schedule>  aspirin enteric coated 81 milliGRAM(s) Oral daily  atorvastatin 80 milliGRAM(s) Oral at bedtime  clopidogrel Tablet 75 milliGRAM(s) Oral daily  ferrous    sulfate 325 milliGRAM(s) Oral daily  folic acid 1 milliGRAM(s) Oral daily  hydrALAZINE 25 milliGRAM(s) Oral two times a day  losartan 100 milliGRAM(s) Oral daily  magnesium oxide 400 milliGRAM(s) Oral daily  metoprolol succinate  milliGRAM(s) Oral daily  multivitamin 1 Tablet(s) Oral daily  pantoprazole    Tablet 40 milliGRAM(s) Oral before breakfast  senna 2 Tablet(s) Oral at bedtime    MEDICATIONS  (PRN):  acetaminophen   Tablet .. 650 milliGRAM(s) Oral every 6 hours PRN Mild Pain (1 - 3)  benzocaine 15 mG/menthol 3.6 mG (Sugar-Free) Lozenge 1 Lozenge Oral every 4 hours PRN Sore Throat  guaiFENesin   Syrup  (Sugar-Free) 200 milliGRAM(s) Oral every 6 hours PRN Cough  ondansetron Injectable 4 milliGRAM(s) IV Push every 6 hours PRN Nausea and/or Vomiting  oxyCODONE    IR 5 milliGRAM(s) Oral every 8 hours PRN Moderate Pain (4 - 6)      Vital Signs Last 24 Hrs  T(C): 36.9 (03 Mar 2020 05:16), Max: 36.9 (02 Mar 2020 16:27)  T(F): 98.5 (03 Mar 2020 05:16), Max: 98.5 (03 Mar 2020 05:16)  HR: 64 (03 Mar 2020 07:02) (63 - 81)  BP: 159/31 (03 Mar 2020 05:16) (127/34 - 171/36)  BP(mean): --  RR: 16 (03 Mar 2020 05:16) (16 - 18)  SpO2: 95% (03 Mar 2020 05:16) (95% - 99%)    I&O's Detail      Daily     Daily Weight in k.2 (03 Mar 2020 06:43)    Daily     Daily Weight in k.8 (02 Mar 2020 05:43)    Allergies:  predniSONE (Other (Moderate))      PHYSICAL EXAM:  Constitutional: NAD, awake and alert, appears comfortable and stated age  HEENT:  No oral cyanosis.  Pulmonary: Non-labored, breath sounds are clear bilaterally, No crackles  Cardiovascular: S1 and S2, regular rate and rhythm  Gastrointestinal: Bowel Sounds present, soft, nontender.   Lymph: No peripheral edema. No cervical lymphadenopathy.  Neurological: Alert, no focal deficits  Skin: No rashes.  Psych:  Mood & affect appropriate  Groin region with well healed wounds from recent vascular surgery.    LABS:                                9.1    9.39  )-----------( 364      ( 02 Mar 2020 07:14 )             28.1     03-02    135  |  100  |  13  ----------------------------<  112<H>  3.3<L>   |  32<H>  |  0.81    Ca    8.1<L>      02 Mar 2020 07:14    Pro Bnp [id number]    CT Angio Chest PE Protocol w/ IV Cont (20 @ 04:18): No pulmonary embolus. A 1.2 x 1.0 cm nodular opacity in the left lower lobe is indeterminate. Consider correlation with PET/CT. Small bilateral pleural effusions.    Transthoracic Echocardiogram (20 @ 08:37):   The left ventricle is normal in size, wall thickness, wall motion and contractility.  Estimated left ventricular ejection fraction is 60-65 %.   The left atrium is mildly dilated.   Normal appearing right ventricle structure and function.   Fibrocalcific changes noted to the Aortic valve leaflets with preserved  leaflet excursion.   Mild mitral annular calcification is present.   The mitral valve leaflets appear thickened with mildly restricted leaflet excursion.   Moderate (2+) mitral regurgitation is present.   No evidence of pericardial effusion.    Cardiac Cath Lab - Adult (20 @ 11:15):   LMCA: Diffuse irregularity. There is mild diffuse disease noted.   Prox LAD: Distal subsection.70% stenosis    Mid CX: Mid subsection.85% stenosis    ProxR CA: Ostial.100% stenosis   Ramus: Diffuse irregularity.   EF 50% on ventriculography    Tele: Sinus rhythm    12 Lead ECG (20 @ 02:08):  Sinus rhythm with Premature atrial complexes. ST & T wave abnormality, consider lateral ischemia

## 2020-03-03 NOTE — PHYSICAL THERAPY INITIAL EVALUATION ADULT - GENERAL OBSERVATIONS, REHAB EVAL
Pt received upright in bed, AAOx4, denies pain, states frequent shortness of breath and cough. Daughter present.

## 2020-03-04 LAB
ANION GAP SERPL CALC-SCNC: 6 MMOL/L — SIGNIFICANT CHANGE UP (ref 5–17)
BUN SERPL-MCNC: 14 MG/DL — SIGNIFICANT CHANGE UP (ref 7–23)
CALCIUM SERPL-MCNC: 8.6 MG/DL — SIGNIFICANT CHANGE UP (ref 8.5–10.1)
CHLORIDE SERPL-SCNC: 100 MMOL/L — SIGNIFICANT CHANGE UP (ref 96–108)
CO2 SERPL-SCNC: 30 MMOL/L — SIGNIFICANT CHANGE UP (ref 22–31)
CREAT SERPL-MCNC: 0.75 MG/DL — SIGNIFICANT CHANGE UP (ref 0.5–1.3)
GLUCOSE SERPL-MCNC: 100 MG/DL — HIGH (ref 70–99)
HCT VFR BLD CALC: 31.3 % — LOW (ref 34.5–45)
HGB BLD-MCNC: 9.8 G/DL — LOW (ref 11.5–15.5)
MCHC RBC-ENTMCNC: 27.8 PG — SIGNIFICANT CHANGE UP (ref 27–34)
MCHC RBC-ENTMCNC: 31.3 GM/DL — LOW (ref 32–36)
MCV RBC AUTO: 88.7 FL — SIGNIFICANT CHANGE UP (ref 80–100)
PLATELET # BLD AUTO: 423 K/UL — HIGH (ref 150–400)
POTASSIUM SERPL-MCNC: 4.3 MMOL/L — SIGNIFICANT CHANGE UP (ref 3.5–5.3)
POTASSIUM SERPL-SCNC: 4.3 MMOL/L — SIGNIFICANT CHANGE UP (ref 3.5–5.3)
RBC # BLD: 3.53 M/UL — LOW (ref 3.8–5.2)
RBC # FLD: 16.6 % — HIGH (ref 10.3–14.5)
SODIUM SERPL-SCNC: 136 MMOL/L — SIGNIFICANT CHANGE UP (ref 135–145)
WBC # BLD: 12.8 K/UL — HIGH (ref 3.8–10.5)
WBC # FLD AUTO: 12.8 K/UL — HIGH (ref 3.8–10.5)

## 2020-03-04 PROCEDURE — 99232 SBSQ HOSP IP/OBS MODERATE 35: CPT

## 2020-03-04 PROCEDURE — 99233 SBSQ HOSP IP/OBS HIGH 50: CPT

## 2020-03-04 PROCEDURE — 71045 X-RAY EXAM CHEST 1 VIEW: CPT | Mod: 26

## 2020-03-04 RX ORDER — FUROSEMIDE 40 MG
40 TABLET ORAL DAILY
Refills: 0 | Status: DISCONTINUED | OUTPATIENT
Start: 2020-03-05 | End: 2020-03-05

## 2020-03-04 RX ORDER — FUROSEMIDE 40 MG
40 TABLET ORAL ONCE
Refills: 0 | Status: COMPLETED | OUTPATIENT
Start: 2020-03-04 | End: 2020-03-04

## 2020-03-04 RX ADMIN — Medication 25 MILLIGRAM(S): at 05:57

## 2020-03-04 RX ADMIN — Medication 40 MILLIGRAM(S): at 21:48

## 2020-03-04 RX ADMIN — Medication 200 MILLIGRAM(S): at 21:46

## 2020-03-04 RX ADMIN — AMLODIPINE BESYLATE 5 MILLIGRAM(S): 2.5 TABLET ORAL at 21:46

## 2020-03-04 RX ADMIN — CLOPIDOGREL BISULFATE 75 MILLIGRAM(S): 75 TABLET, FILM COATED ORAL at 12:49

## 2020-03-04 RX ADMIN — Medication 10 MILLIEQUIVALENT(S): at 12:49

## 2020-03-04 RX ADMIN — Medication 25 MILLIGRAM(S): at 17:48

## 2020-03-04 RX ADMIN — Medication 1 MILLIGRAM(S): at 12:50

## 2020-03-04 RX ADMIN — MAGNESIUM OXIDE 400 MG ORAL TABLET 400 MILLIGRAM(S): 241.3 TABLET ORAL at 12:49

## 2020-03-04 RX ADMIN — Medication 650 MILLIGRAM(S): at 15:22

## 2020-03-04 RX ADMIN — Medication 325 MILLIGRAM(S): at 12:49

## 2020-03-04 RX ADMIN — Medication 20 MILLIGRAM(S): at 12:49

## 2020-03-04 RX ADMIN — ATORVASTATIN CALCIUM 80 MILLIGRAM(S): 80 TABLET, FILM COATED ORAL at 21:46

## 2020-03-04 RX ADMIN — Medication 3 MILLILITER(S): at 01:21

## 2020-03-04 RX ADMIN — AMIODARONE HYDROCHLORIDE 200 MILLIGRAM(S): 400 TABLET ORAL at 05:57

## 2020-03-04 RX ADMIN — Medication 200 MILLIGRAM(S): at 05:58

## 2020-03-04 RX ADMIN — PANTOPRAZOLE SODIUM 40 MILLIGRAM(S): 20 TABLET, DELAYED RELEASE ORAL at 05:57

## 2020-03-04 RX ADMIN — Medication 650 MILLIGRAM(S): at 21:50

## 2020-03-04 RX ADMIN — Medication 650 MILLIGRAM(S): at 22:30

## 2020-03-04 RX ADMIN — LOSARTAN POTASSIUM 100 MILLIGRAM(S): 100 TABLET, FILM COATED ORAL at 05:57

## 2020-03-04 RX ADMIN — Medication 100 MILLIGRAM(S): at 05:57

## 2020-03-04 RX ADMIN — Medication 3 MILLILITER(S): at 19:32

## 2020-03-04 RX ADMIN — Medication 1 TABLET(S): at 12:49

## 2020-03-04 RX ADMIN — Medication 81 MILLIGRAM(S): at 12:49

## 2020-03-04 RX ADMIN — AMLODIPINE BESYLATE 5 MILLIGRAM(S): 2.5 TABLET ORAL at 12:49

## 2020-03-04 RX ADMIN — Medication 3 MILLILITER(S): at 08:31

## 2020-03-04 NOTE — PROGRESS NOTE ADULT - SUBJECTIVE AND OBJECTIVE BOX
PCP:    REQUESTING PHYSICIAN:    REASON FOR CONSULT:    CHIEF COMPLAINT:    HPI:  80 year old woman with a history of CAD, prior coronary stents with progression of CAD on recent coronary angiography, paroxysmal atrial fibrillation (off anticoagulation due to recent GI bleed), peripheral artery disease s/p recent surgical revascularization (aorto-femoral bypass surgery at [facility] last week), lung cancer, emphysema, who presented to the ER on 2020 for the evaluation of cough, dyspnea, and poorly-controlled HTN several hours after her arrival at a rehabilitation nursing facility.  She describes recent "bronchitis" that has progressively worsened over the past 1 week with cough and mild shortness of breath; unable to identify exacerbating or alleviating factors.  She was found to be hypertensive (190/44) in the ED and was diagnosed with an exacerbation of HFpEF and uncontrolled HTN.  She feels "better" at time of my encounter.  She denies angina.    3/2/2020: Breathing better.  No chest pain. Picture seems c/w Mild CHF and bronchitis on top of COPD combo.    3/3/2020: Had mild SOB last night that responded to IV lasix.  Feels better this AM. BP better. OOB this AM.   3/4/20: Pt feels weak. Complains of incisional pain. Denies chest pain.     PAST MEDICAL & SURGICAL HISTORY:  Anemia  Lung cancer  Peptic ulcer disease  Atrial fibrillation  MI (myocardial infarction)  CAD (coronary artery disease)  HTN (hypertension)  H/O aorto-femoral bypass      SOCIAL HISTORY:    FAMILY HISTORY:  Family history of intracranial hemorrhage      ALLERGIES:  Allergies    predniSONE (Other (Moderate))    Intolerances        MEDICATIONS:    MEDICATIONS  (STANDING):  albuterol/ipratropium for Nebulization 3 milliLiter(s) Nebulizer every 6 hours  aMIOdarone    Tablet 200 milliGRAM(s) Oral daily  amLODIPine   Tablet 5 milliGRAM(s) Oral <User Schedule>  aspirin enteric coated 81 milliGRAM(s) Oral daily  atorvastatin 80 milliGRAM(s) Oral at bedtime  clopidogrel Tablet 75 milliGRAM(s) Oral daily  ferrous    sulfate 325 milliGRAM(s) Oral daily  folic acid 1 milliGRAM(s) Oral daily  furosemide    Tablet 20 milliGRAM(s) Oral daily  hydrALAZINE 25 milliGRAM(s) Oral two times a day  losartan 100 milliGRAM(s) Oral daily  magnesium oxide 400 milliGRAM(s) Oral daily  metoprolol succinate  milliGRAM(s) Oral daily  multivitamin 1 Tablet(s) Oral daily  pantoprazole    Tablet 40 milliGRAM(s) Oral before breakfast  potassium chloride    Tablet ER 10 milliEquivalent(s) Oral daily  senna 2 Tablet(s) Oral at bedtime    MEDICATIONS  (PRN):  acetaminophen   Tablet .. 650 milliGRAM(s) Oral every 6 hours PRN Mild Pain (1 - 3)  benzocaine 15 mG/menthol 3.6 mG (Sugar-Free) Lozenge 1 Lozenge Oral every 4 hours PRN Sore Throat  guaiFENesin   Syrup  (Sugar-Free) 200 milliGRAM(s) Oral every 6 hours PRN Cough  ondansetron Injectable 4 milliGRAM(s) IV Push every 6 hours PRN Nausea and/or Vomiting  oxyCODONE    IR 5 milliGRAM(s) Oral every 8 hours PRN Moderate Pain (4 - 6)        Vital Signs Last 24 Hrs  T(C): 36.6 (04 Mar 2020 10:18), Max: 36.9 (03 Mar 2020 21:27)  T(F): 97.8 (04 Mar 2020 10:18), Max: 98.5 (04 Mar 2020 05:15)  HR: 55 (04 Mar 2020 10:18) (55 - 86)  BP: 138/45 (04 Mar 2020 10:18) (138/45 - 187/25)  BP(mean): --  RR: 18 (04 Mar 2020 10:18) (18 - 20)  SpO2: 94% (04 Mar 2020 10:18) (93% - 97%)Daily     Daily Weight in k.1 (04 Mar 2020 05:15)I&O's Summary    03 Mar 2020 07:01  -  04 Mar 2020 07:00  --------------------------------------------------------  IN: 0 mL / OUT: 940 mL / NET: -940 mL        PHYSICAL EXAM:    Constitutional: NAD, awake and alert, well-developed  HEENT: PERR, EOMI,  No oral cyananosis.  Neck:  supple,  No JVD  Respiratory: Breath sounds are clear bilaterally, No wheezing, rales or rhonchi  Cardiovascular: S1 and S2, regular rate and rhythm, no Murmurs, gallops or rubs  Gastrointestinal: Bowel Sounds present, soft, nontender.   Extremities: No peripheral edema. No clubbing or cyanosis.  Vascular: well healing incision right groin  Neurological: A/O x 3, no focal deficits  Musculoskeletal: no calf tenderness.  Skin: No rashes.      LABS: All Labs Reviewed:                        9.8    12.80 )-----------( 423      ( 04 Mar 2020 11:50 )             31.3                         9.1    9.39  )-----------( 364      ( 02 Mar 2020 07:14 )             28.1     04 Mar 2020 07:17    136    |  100    |  14     ----------------------------<  100    4.3     |  30     |  0.75   03 Mar 2020 08:04    134    |  99     |  12     ----------------------------<  102    4.1     |  33     |  0.88   02 Mar 2020 07:14    135    |  100    |  13     ----------------------------<  112    3.3     |  32     |  0.81     Ca    8.6        04 Mar 2020 07:17  Ca    8.2        03 Mar 2020 08:04  Ca    8.1        02 Mar 2020 07:14  Mg     2.0       03 Mar 2020 08:04            Blood Culture: Organism --  Gram Stain Blood -- Gram Stain --  Specimen Source .Blood None  Culture-Blood --            RADIOLOGY/EKG:< from: 12 Lead ECG (20 @ 02:08) >  Diagnosis Line Sinus rhythm with Premature atrial complexes  ST & T wave abnormality, consider lateral ischemia  Prolonged QT  Abnormal ECG  Confirmed by Johan Koroma () on 3/1/2020 10:36:53 AM    < end of copied text >        ECHO/CARDIAC CATHTERIZATION/STRESS TEST:  < from: Transthoracic Echocardiogram (20 @ 08:37) >  Impression     Summary     The left ventricle is normal in size, wall thickness, wall motion and   contractility.   Estimated left ventricular ejection fraction is 60-65 %.   The left atrium is mildly dilated.   Normal appearing right ventricle structure and function.   Fibrocalcific changes noted to the Aortic valve leaflets with preserved   leaflet excursion.   Mild mitral annular calcification is present.   The mitral valve leaflets appear thickened with mildly restricted leaflet   excursion.   Moderate (2+) mitral regurgitation is present.   No evidence of pericardial effusion.     Signature     ----------------------------------------------------------------   Electronically signed by M    < end of copied text >

## 2020-03-04 NOTE — PROGRESS NOTE ADULT - PROBLEM SELECTOR PLAN 4
Stable currently. Likely contributing as poor reserve given unrevascularized CAD.  Will need to deal with subsequently once better.

## 2020-03-04 NOTE — PROGRESS NOTE ADULT - SUBJECTIVE AND OBJECTIVE BOX
79 yo Female with a hx of anemia, CAD on plavix, MI, anemia, HTN, hyperlipidemia, lung cancer in remission s/p resection; atrial fibrillation (off eliquis for 1 month due to GI bleeding); GERD ; post op day 4 after aorto-femoral bypass surgery at [facility] now presenting with chest pressure intermittently with shortness of breath and wet cough.  Patient states she had URI upon admission to [facility] and was told she had humanmetapneumo virus.  Patient underwent surgery and was discharged yesterday afternoon to [facility].  Patient had swelling of legs post surgery and had dopplers done which were negative for DVT prior to discharge.  Patient denies fever, chills, back pain. No fever.       03/02/20: Patient seen and examined. SOB improving, diuresing well. Discussed with patient regarding management and d/c plan.   03/03/20: Patient seen and examined. SOB improving. Feels weak and tired.   03/04/20: Patient seen and examined. Complaining of tiredness and dizziness.        Vital Signs Last 24 Hrs  T(C): 36.6 (04 Mar 2020 10:18), Max: 36.9 (03 Mar 2020 21:27)  T(F): 97.8 (04 Mar 2020 10:18), Max: 98.5 (04 Mar 2020 05:15)  HR: 55 (04 Mar 2020 10:18) (55 - 86)  BP: 138/45 (04 Mar 2020 10:18) (138/45 - 187/25)  BP(mean): --  RR: 18 (04 Mar 2020 10:18) (18 - 20)  SpO2: 94% (04 Mar 2020 10:18) (93% - 97%)      Physical Exam:       · ENMT: Airway patent, Nasal mucosa clear. Mouth with dry mucosa. Throat has no vesicles, no oropharyngeal exudates and uvula is midline.	  · EYES: Clear bilaterally, pupils equal, round and reactive to light.	  · CARDIAC: Normal rate, regular rhythm.  Heart sounds S1, S2.  No murmurs, rubs or gallops.	  · RESPIRATORY: Diminished breath sounds in bilateral bases; coarse upper airway sounds.	  · GASTROINTESTINAL: Abdomen soft, non-tender, no guarding.	  · MUSCULOSKELETAL: Spine appears normal, range of motion is not limited, no muscle or joint tenderness	  · NEUROLOGICAL: Alert and oriented, no focal deficits, no motor or sensory deficits.	  · SKIN: Skin slightly pale; incisions in groin with sutures, clean dry intact and nontender without erythema.	  · PSYCHIATRIC: Alert and oriented to person, place, time/situation. flat affect	  · HEME LYMPH: No adenopathy or splenomegaly. No cervical or inguinal lymphadenopathy. Trace edema of bilateral feet and ankles	                                         9.8    12.80 )-----------( 423      ( 04 Mar 2020 11:50 )             31.3     04 Mar 2020 07:17    136    |  100    |  14     ----------------------------<  100    4.3     |  30     |  0.75     Ca    8.6        04 Mar 2020 07:17  Mg     2.0       03 Mar 2020 08:04            MEDICATIONS  (STANDING):  albuterol/ipratropium for Nebulization 3 milliLiter(s) Nebulizer every 6 hours  aMIOdarone    Tablet 200 milliGRAM(s) Oral daily  amLODIPine   Tablet 5 milliGRAM(s) Oral <User Schedule>  aspirin enteric coated 81 milliGRAM(s) Oral daily  atorvastatin 80 milliGRAM(s) Oral at bedtime  clopidogrel Tablet 75 milliGRAM(s) Oral daily  ferrous    sulfate 325 milliGRAM(s) Oral daily  folic acid 1 milliGRAM(s) Oral daily  furosemide   Injectable 40 milliGRAM(s) IV Push daily  hydrALAZINE 50 milliGRAM(s) Oral three times a day  losartan 100 milliGRAM(s) Oral daily  magnesium oxide 400 milliGRAM(s) Oral daily  metoprolol succinate  milliGRAM(s) Oral daily  multivitamin 1 Tablet(s) Oral daily  pantoprazole    Tablet 40 milliGRAM(s) Oral before breakfast  senna 2 Tablet(s) Oral at bedtime    MEDICATIONS  (PRN):  acetaminophen   Tablet .. 650 milliGRAM(s) Oral every 6 hours PRN Mild Pain (1 - 3)  benzocaine 15 mG/menthol 3.6 mG (Sugar-Free) Lozenge 1 Lozenge Oral every 4 hours PRN Sore Throat  guaiFENesin   Syrup  (Sugar-Free) 200 milliGRAM(s) Oral every 6 hours PRN Cough  ondansetron Injectable 4 milliGRAM(s) IV Push every 6 hours PRN Nausea and/or Vomiting  oxyCODONE    IR 5 milliGRAM(s) Oral every 8 hours PRN Moderate Pain (4 - 6)            Assessment and Plan:   Assessment:  · Assessment		  79 yo Female presented with shortness of breath.     A/P:    1.  Acute diastolic CHF exacerbation  Pleural effusion  CAD  -S/P IV lasix, changed to po. Dizziness possibly due to IV lasix   -follow Is and Os  -daily weight  -follow clinically  -Dr Obrien follow up appreciated  -continue aspirin, statin, plavix, bb, ARB    2.  HTN  -follow BP and adjust meds as needed    3.  HLD  -on statin    4.  Hypokalemia due to lasix  Replaced, resolved  Follow    Disposition: PT silvia appreciated  Possible d/c back to rehab in 1 or 2 days time.     Transfer to floor

## 2020-03-04 NOTE — PROGRESS NOTE ADULT - PROBLEM SELECTOR PLAN 1
From a combination of COPD/bronchitis and acute on chronic systolic/diastolic CHF. Remains weak. Minimal ambulation

## 2020-03-05 LAB
ANION GAP SERPL CALC-SCNC: 7 MMOL/L — SIGNIFICANT CHANGE UP (ref 5–17)
BUN SERPL-MCNC: 14 MG/DL — SIGNIFICANT CHANGE UP (ref 7–23)
CALCIUM SERPL-MCNC: 8.6 MG/DL — SIGNIFICANT CHANGE UP (ref 8.5–10.1)
CHLORIDE SERPL-SCNC: 100 MMOL/L — SIGNIFICANT CHANGE UP (ref 96–108)
CO2 SERPL-SCNC: 30 MMOL/L — SIGNIFICANT CHANGE UP (ref 22–31)
CREAT SERPL-MCNC: 0.94 MG/DL — SIGNIFICANT CHANGE UP (ref 0.5–1.3)
GLUCOSE SERPL-MCNC: 157 MG/DL — HIGH (ref 70–99)
HCT VFR BLD CALC: 29.8 % — LOW (ref 34.5–45)
HGB BLD-MCNC: 9.6 G/DL — LOW (ref 11.5–15.5)
MCHC RBC-ENTMCNC: 28.3 PG — SIGNIFICANT CHANGE UP (ref 27–34)
MCHC RBC-ENTMCNC: 32.2 GM/DL — SIGNIFICANT CHANGE UP (ref 32–36)
MCV RBC AUTO: 87.9 FL — SIGNIFICANT CHANGE UP (ref 80–100)
PLATELET # BLD AUTO: 442 K/UL — HIGH (ref 150–400)
POTASSIUM SERPL-MCNC: 3.7 MMOL/L — SIGNIFICANT CHANGE UP (ref 3.5–5.3)
POTASSIUM SERPL-SCNC: 3.7 MMOL/L — SIGNIFICANT CHANGE UP (ref 3.5–5.3)
RBC # BLD: 3.39 M/UL — LOW (ref 3.8–5.2)
RBC # FLD: 16.5 % — HIGH (ref 10.3–14.5)
SODIUM SERPL-SCNC: 137 MMOL/L — SIGNIFICANT CHANGE UP (ref 135–145)
WBC # BLD: 9.83 K/UL — SIGNIFICANT CHANGE UP (ref 3.8–10.5)
WBC # FLD AUTO: 9.83 K/UL — SIGNIFICANT CHANGE UP (ref 3.8–10.5)

## 2020-03-05 PROCEDURE — 99233 SBSQ HOSP IP/OBS HIGH 50: CPT

## 2020-03-05 PROCEDURE — 99232 SBSQ HOSP IP/OBS MODERATE 35: CPT

## 2020-03-05 RX ORDER — HYDRALAZINE HCL 50 MG
25 TABLET ORAL EVERY 8 HOURS
Refills: 0 | Status: DISCONTINUED | OUTPATIENT
Start: 2020-03-05 | End: 2020-03-07

## 2020-03-05 RX ORDER — FUROSEMIDE 40 MG
40 TABLET ORAL
Refills: 0 | Status: DISCONTINUED | OUTPATIENT
Start: 2020-03-05 | End: 2020-03-08

## 2020-03-05 RX ADMIN — ATORVASTATIN CALCIUM 80 MILLIGRAM(S): 80 TABLET, FILM COATED ORAL at 21:24

## 2020-03-05 RX ADMIN — Medication 650 MILLIGRAM(S): at 08:27

## 2020-03-05 RX ADMIN — Medication 650 MILLIGRAM(S): at 09:00

## 2020-03-05 RX ADMIN — AMIODARONE HYDROCHLORIDE 200 MILLIGRAM(S): 400 TABLET ORAL at 06:26

## 2020-03-05 RX ADMIN — Medication 325 MILLIGRAM(S): at 12:23

## 2020-03-05 RX ADMIN — Medication 25 MILLIGRAM(S): at 06:27

## 2020-03-05 RX ADMIN — LOSARTAN POTASSIUM 100 MILLIGRAM(S): 100 TABLET, FILM COATED ORAL at 06:26

## 2020-03-05 RX ADMIN — Medication 25 MILLIGRAM(S): at 13:39

## 2020-03-05 RX ADMIN — Medication 25 MILLIGRAM(S): at 21:24

## 2020-03-05 RX ADMIN — Medication 3 MILLILITER(S): at 19:50

## 2020-03-05 RX ADMIN — PANTOPRAZOLE SODIUM 40 MILLIGRAM(S): 20 TABLET, DELAYED RELEASE ORAL at 06:27

## 2020-03-05 RX ADMIN — CLOPIDOGREL BISULFATE 75 MILLIGRAM(S): 75 TABLET, FILM COATED ORAL at 12:23

## 2020-03-05 RX ADMIN — Medication 100 MILLIGRAM(S): at 06:26

## 2020-03-05 RX ADMIN — Medication 650 MILLIGRAM(S): at 18:29

## 2020-03-05 RX ADMIN — Medication 3 MILLILITER(S): at 07:22

## 2020-03-05 RX ADMIN — Medication 1 MILLIGRAM(S): at 12:23

## 2020-03-05 RX ADMIN — Medication 81 MILLIGRAM(S): at 12:22

## 2020-03-05 RX ADMIN — Medication 3 MILLILITER(S): at 13:50

## 2020-03-05 RX ADMIN — AMLODIPINE BESYLATE 5 MILLIGRAM(S): 2.5 TABLET ORAL at 18:29

## 2020-03-05 RX ADMIN — Medication 10 MILLIEQUIVALENT(S): at 12:23

## 2020-03-05 RX ADMIN — Medication 40 MILLIGRAM(S): at 18:29

## 2020-03-05 RX ADMIN — Medication 200 MILLIGRAM(S): at 22:22

## 2020-03-05 RX ADMIN — Medication 1 TABLET(S): at 12:23

## 2020-03-05 RX ADMIN — AMLODIPINE BESYLATE 5 MILLIGRAM(S): 2.5 TABLET ORAL at 08:23

## 2020-03-05 RX ADMIN — Medication 40 MILLIGRAM(S): at 06:29

## 2020-03-05 RX ADMIN — Medication 3 MILLILITER(S): at 01:11

## 2020-03-05 RX ADMIN — MAGNESIUM OXIDE 400 MG ORAL TABLET 400 MILLIGRAM(S): 241.3 TABLET ORAL at 12:23

## 2020-03-05 NOTE — PROGRESS NOTE ADULT - SUBJECTIVE AND OBJECTIVE BOX
79 yo Female with a hx of anemia, CAD on plavix, MI, anemia, HTN, hyperlipidemia, lung cancer in remission s/p resection; atrial fibrillation (off eliquis for 1 month due to GI bleeding); GERD ; post op day 4 after aorto-femoral bypass surgery at [facility] now presenting with chest pressure intermittently with shortness of breath and wet cough.  Patient states she had URI upon admission to [facility] and was told she had humanmetapneumo virus.  Patient underwent surgery and was discharged yesterday afternoon to [facility].  Patient had swelling of legs post surgery and had dopplers done which were negative for DVT prior to discharge.  Patient denies fever, chills, back pain. No fever.       03/02/20: Patient seen and examined. SOB improving, diuresing well. Discussed with patient regarding management and d/c plan.   03/03/20: Patient seen and examined. SOB improving. Feels weak and tired.   03/04/20: Patient seen and examined. Complaining of tiredness and dizziness.  03/05/20: Patient seen and examined. SOB improving, received extra dose of lasix last night due to sob. BP still high. Hydralazine was increased.         Vital Signs Last 24 Hrs  T(C): 36.8 (05 Mar 2020 10:49), Max: 36.8 (05 Mar 2020 10:49)  T(F): 98.3 (05 Mar 2020 10:49), Max: 98.3 (05 Mar 2020 10:49)  HR: 74 (05 Mar 2020 13:52) (56 - 86)  BP: 165/45 (05 Mar 2020 13:38) (150/30 - 174/37)  BP(mean): --  RR: 19 (05 Mar 2020 10:49) (18 - 20)  SpO2: 98% (05 Mar 2020 10:49) (94% - 100%)        Physical Exam:       · ENMT: Airway patent, Nasal mucosa clear. Mouth with dry mucosa. Throat has no vesicles, no oropharyngeal exudates and uvula is midline.	  · EYES: Clear bilaterally, pupils equal, round and reactive to light.	  · CARDIAC: Normal rate, regular rhythm.  Heart sounds S1, S2.  No murmurs, rubs or gallops.	  · RESPIRATORY: Diminished breath sounds in bilateral bases; coarse upper airway sounds.	  · GASTROINTESTINAL: Abdomen soft, non-tender, no guarding.	  · MUSCULOSKELETAL: Spine appears normal, range of motion is not limited, no muscle or joint tenderness	  · NEUROLOGICAL: Alert and oriented, no focal deficits, no motor or sensory deficits.	  · SKIN: Skin slightly pale; incisions in groin with sutures, clean dry intact and nontender without erythema.	  · PSYCHIATRIC: Alert and oriented to person, place, time/situation. flat affect	  · HEME LYMPH: No adenopathy or splenomegaly. No cervical or inguinal lymphadenopathy. Trace edema of bilateral feet and ankles	                                             9.6    9.83  )-----------( 442      ( 05 Mar 2020 08:16 )             29.8     05 Mar 2020 08:16    137    |  100    |  14     ----------------------------<  157    3.7     |  30     |  0.94     Ca    8.6        05 Mar 2020 08:16        MEDICATIONS  (STANDING):  albuterol/ipratropium for Nebulization 3 milliLiter(s) Nebulizer every 6 hours  aMIOdarone    Tablet 200 milliGRAM(s) Oral daily  amLODIPine   Tablet 5 milliGRAM(s) Oral <User Schedule>  aspirin enteric coated 81 milliGRAM(s) Oral daily  atorvastatin 80 milliGRAM(s) Oral at bedtime  clopidogrel Tablet 75 milliGRAM(s) Oral daily  ferrous    sulfate 325 milliGRAM(s) Oral daily  folic acid 1 milliGRAM(s) Oral daily  furosemide    Tablet 40 milliGRAM(s) Oral two times a day  hydrALAZINE 25 milliGRAM(s) Oral every 8 hours  losartan 100 milliGRAM(s) Oral daily  magnesium oxide 400 milliGRAM(s) Oral daily  metoprolol succinate  milliGRAM(s) Oral daily  multivitamin 1 Tablet(s) Oral daily  pantoprazole    Tablet 40 milliGRAM(s) Oral before breakfast  potassium chloride    Tablet ER 10 milliEquivalent(s) Oral daily  senna 2 Tablet(s) Oral at bedtime    MEDICATIONS  (PRN):  acetaminophen   Tablet .. 650 milliGRAM(s) Oral every 6 hours PRN Mild Pain (1 - 3)  benzocaine 15 mG/menthol 3.6 mG (Sugar-Free) Lozenge 1 Lozenge Oral every 4 hours PRN Sore Throat  guaiFENesin   Syrup  (Sugar-Free) 200 milliGRAM(s) Oral every 6 hours PRN Cough  ondansetron Injectable 4 milliGRAM(s) IV Push every 6 hours PRN Nausea and/or Vomiting  oxyCODONE    IR 5 milliGRAM(s) Oral every 8 hours PRN Moderate Pain (4 - 6)          Assessment and Plan:   Assessment:  · Assessment		  79 yo Female presented with shortness of breath.     A/P:    1.  Acute diastolic CHF exacerbation  Pleural effusion  CAD  -S/P IV lasix, changed to po. Dizziness possibly due to IV lasix   -follow Is and Os  -daily weight  -follow clinically  -Dr Jara  follow up appreciated  -continue aspirin, statin, plavix, bb, ARB    2.  HTN: not optimally controlled  -Hydralazine increased    3.  HLD  -on statin    4.  Hypokalemia due to lasix  Replaced, resolved  Follow while on lasix    5.  Chr A Fib  Continue lopressor and amiodarone  Not on AC due to GI bleed    Disposition: PT eval appreciated  Possible d/c back to rehab in 1 or 2 days time.

## 2020-03-05 NOTE — PROGRESS NOTE ADULT - SUBJECTIVE AND OBJECTIVE BOX
REASON FOR VISIT: SOB, CHF    HPI:  80 year old woman with a history of CAD, prior coronary stents with progression of CAD on recent coronary angiography, paroxysmal atrial fibrillation (off anticoagulation due to recent GI bleed), peripheral artery disease s/p recent surgical revascularization (aorto-femoral bypass surgery at [facility] last week), lung cancer, emphysema, who presented to the ER on 2/29/2020 for the evaluation of cough, dyspnea, and poorly-controlled HTN several hours after her arrival at a rehabilitation nursing facility.  She describes recent "bronchitis" that has progressively worsened over the past 1 week with cough and mild shortness of breath.  In the ED and was diagnosed with an exacerbation of HFpEF and uncontrolled HTN.      3/2/2020: Breathing better.  No chest pain. Picture seems c/w Mild CHF and bronchitis on top of COPD combo.    3/3/2020: Had mild SOB last night that responded to IV lasix.  Feels better this AM. BP better. OOB this AM.   3/4/20: Pt feels weak. Complains of incisional pain. Denies chest pain.   3/5/2020:  Episode of dyspnea last night that improved with additional diuretic dose; feels "better" this AM; improved cough.    CARDIAC MEDICATIONS  (STANDING):  aMIOdarone    Tablet 200 milliGRAM(s) Oral daily  amLODIPine   Tablet 5 milliGRAM(s) Oral <User Schedule>  aspirin enteric coated 81 milliGRAM(s) Oral daily  atorvastatin 80 milliGRAM(s) Oral at bedtime  clopidogrel Tablet 75 milliGRAM(s) Oral daily  furosemide    Tablet 40 milliGRAM(s) Oral daily  hydrALAZINE 25 milliGRAM(s) Oral two times a day  losartan 100 milliGRAM(s) Oral daily  metoprolol succinate  milliGRAM(s) Oral daily    Vital Signs Last 24 Hrs  T(C): 36.6 (05 Mar 2020 05:12), Max: 36.7 (04 Mar 2020 17:36)  T(F): 97.9 (05 Mar 2020 05:12), Max: 98.1 (04 Mar 2020 17:36)  HR: 78 (05 Mar 2020 07:24) (55 - 86)  BP: 174/37 (05 Mar 2020 05:12) (138/45 - 174/37)  RR: 18 (05 Mar 2020 05:12) (18 - 20)  SpO2: 100% (05 Mar 2020 05:12) (94% - 100%)    PHYSICAL EXAM:  Constitutional: NAD, awake and alert, seated in bedside chair  Respiratory: Nonlabored, occasional cough  Cardiovascular: S1 and S2, regular rate and rhythm  Gastrointestinal: Bowel Sounds present, soft, nontender.   Extremities: No peripheral edema.  Psych: Appropriate mood and affect    LABS:                  9.8    12.80 )-----------( 423      ( 04 Mar 2020 11:50 )             31.3     136  |  100  |  14  ----------------------------<  100<H>  4.3   |  30  |  0.75    Ca    8.6      04 Mar 2020 07:17  Mg     2.0     03-03    Transthoracic Echocardiogram (01.06.20 @ 08:37):   The left ventricle is normal in size, wall thickness, wall motion and contractility. Estimated left ventricular ejection fraction is 60-65 %.   The left atrium is mildly dilated.   Normal appearing right ventricle structure and function.   Fibrocalcific changes noted to the Aortic valve leaflets with preserved leaflet excursion.   Mild mitral annular calcification is present.   The mitral valve leaflets appear thickened with mildly restricted leaflet excursion.   Moderate (2+) mitral regurgitation is present.   No evidence of pericardial effusion.

## 2020-03-05 NOTE — PROGRESS NOTE ADULT - PROBLEM SELECTOR PLAN 1
Dyspnea likely related to COPD/bronchitis and acute on chronic diastolic CHF.  Worsening symptoms last night requiring a STAT dose of IV Lasix; increase daily Lasix to 40mg PO BID; monitor serum potassium / renal function.

## 2020-03-06 LAB
ANION GAP SERPL CALC-SCNC: 7 MMOL/L — SIGNIFICANT CHANGE UP (ref 5–17)
BUN SERPL-MCNC: 16 MG/DL — SIGNIFICANT CHANGE UP (ref 7–23)
CALCIUM SERPL-MCNC: 8.8 MG/DL — SIGNIFICANT CHANGE UP (ref 8.5–10.1)
CHLORIDE SERPL-SCNC: 97 MMOL/L — SIGNIFICANT CHANGE UP (ref 96–108)
CO2 SERPL-SCNC: 27 MMOL/L — SIGNIFICANT CHANGE UP (ref 22–31)
CREAT SERPL-MCNC: 0.91 MG/DL — SIGNIFICANT CHANGE UP (ref 0.5–1.3)
CULTURE RESULTS: SIGNIFICANT CHANGE UP
CULTURE RESULTS: SIGNIFICANT CHANGE UP
GLUCOSE SERPL-MCNC: 111 MG/DL — HIGH (ref 70–99)
POTASSIUM SERPL-MCNC: 3.8 MMOL/L — SIGNIFICANT CHANGE UP (ref 3.5–5.3)
POTASSIUM SERPL-SCNC: 3.8 MMOL/L — SIGNIFICANT CHANGE UP (ref 3.5–5.3)
SODIUM SERPL-SCNC: 131 MMOL/L — LOW (ref 135–145)
SPECIMEN SOURCE: SIGNIFICANT CHANGE UP
SPECIMEN SOURCE: SIGNIFICANT CHANGE UP

## 2020-03-06 PROCEDURE — 99232 SBSQ HOSP IP/OBS MODERATE 35: CPT

## 2020-03-06 PROCEDURE — 99233 SBSQ HOSP IP/OBS HIGH 50: CPT

## 2020-03-06 RX ADMIN — Medication 81 MILLIGRAM(S): at 11:20

## 2020-03-06 RX ADMIN — AMLODIPINE BESYLATE 5 MILLIGRAM(S): 2.5 TABLET ORAL at 09:01

## 2020-03-06 RX ADMIN — AMLODIPINE BESYLATE 5 MILLIGRAM(S): 2.5 TABLET ORAL at 17:34

## 2020-03-06 RX ADMIN — Medication 325 MILLIGRAM(S): at 11:20

## 2020-03-06 RX ADMIN — CLOPIDOGREL BISULFATE 75 MILLIGRAM(S): 75 TABLET, FILM COATED ORAL at 11:20

## 2020-03-06 RX ADMIN — Medication 25 MILLIGRAM(S): at 22:03

## 2020-03-06 RX ADMIN — Medication 3 MILLILITER(S): at 20:24

## 2020-03-06 RX ADMIN — Medication 100 MILLIGRAM(S): at 05:32

## 2020-03-06 RX ADMIN — Medication 200 MILLIGRAM(S): at 17:35

## 2020-03-06 RX ADMIN — Medication 650 MILLIGRAM(S): at 17:34

## 2020-03-06 RX ADMIN — Medication 40 MILLIGRAM(S): at 17:35

## 2020-03-06 RX ADMIN — Medication 200 MILLIGRAM(S): at 05:33

## 2020-03-06 RX ADMIN — Medication 650 MILLIGRAM(S): at 02:54

## 2020-03-06 RX ADMIN — Medication 650 MILLIGRAM(S): at 02:24

## 2020-03-06 RX ADMIN — Medication 1 TABLET(S): at 11:20

## 2020-03-06 RX ADMIN — Medication 40 MILLIGRAM(S): at 05:32

## 2020-03-06 RX ADMIN — Medication 10 MILLIEQUIVALENT(S): at 11:20

## 2020-03-06 RX ADMIN — LOSARTAN POTASSIUM 100 MILLIGRAM(S): 100 TABLET, FILM COATED ORAL at 05:32

## 2020-03-06 RX ADMIN — PANTOPRAZOLE SODIUM 40 MILLIGRAM(S): 20 TABLET, DELAYED RELEASE ORAL at 05:32

## 2020-03-06 RX ADMIN — Medication 650 MILLIGRAM(S): at 09:02

## 2020-03-06 RX ADMIN — MAGNESIUM OXIDE 400 MG ORAL TABLET 400 MILLIGRAM(S): 241.3 TABLET ORAL at 11:20

## 2020-03-06 RX ADMIN — ATORVASTATIN CALCIUM 80 MILLIGRAM(S): 80 TABLET, FILM COATED ORAL at 22:03

## 2020-03-06 RX ADMIN — Medication 3 MILLILITER(S): at 09:35

## 2020-03-06 RX ADMIN — Medication 25 MILLIGRAM(S): at 05:32

## 2020-03-06 RX ADMIN — AMIODARONE HYDROCHLORIDE 200 MILLIGRAM(S): 400 TABLET ORAL at 05:37

## 2020-03-06 RX ADMIN — Medication 3 MILLILITER(S): at 01:21

## 2020-03-06 RX ADMIN — Medication 25 MILLIGRAM(S): at 14:01

## 2020-03-06 RX ADMIN — Medication 1 MILLIGRAM(S): at 11:21

## 2020-03-06 NOTE — PROGRESS NOTE ADULT - ASSESSMENT
81 yo Female presented with shortness of breath.     1.  Acute diastolic CHF exacerbation  Pleural effusion  CAD  -S/P IV lasix, changed to po. Dizziness possibly due to IV lasix   -follow Is and Os  -daily weight  -continue aspirin, statin, plavix, bb, ARB    2.  HTN: not optimally controlled  -Hydralazine increased    3.  HLD  -on statin    4.  Hypokalemia due to lasix  Replaced, resolved  Follow while on lasix    5.  Chr A Fib  Continue lopressor and amiodarone  Not on AC due to GI bleed    Disposition: PT eval appreciated  Possible d/c back to rehab in 1 or 2 days time.   discussed with pt's PCP/card Dr Obrien and daughter at bedside

## 2020-03-06 NOTE — PROGRESS NOTE ADULT - SUBJECTIVE AND OBJECTIVE BOX
81 yo Female with a hx of anemia, CAD on plavix, MI, anemia, HTN, hyperlipidemia, lung cancer in remission s/p resection; atrial fibrillation (off eliquis for 1 month due to GI bleeding); GERD ; post op day 4 after aorto-femoral bypass surgery at [facility] now presenting with chest pressure intermittently with shortness of breath and wet cough.  Patient states she had URI upon admission to [facility] and was told she had humanmetapneumo virus.  Patient underwent surgery and [facility]  Patient had swelling of legs post surgery and had dopplers done which were negative for DVT prior to discharge.  Patient denies fever, chills, back pain. No fever.       03/02/20: Patient seen and examined. SOB improving, diuresing well. Discussed with patient regarding management and d/c plan.   03/03/20: Patient seen and examined. SOB improving. Feels weak and tired.   03/04/20: Patient seen and examined. Complaining of tiredness and dizziness.  03/05/20: Patient seen and examined. SOB improving, received extra dose of lasix last night due to sob. BP still high. Hydralazine was increased.   3/6- ambualting in hallway, feels tired, mild dyspnea on exertion, improving      Physical Exam:       · ENMT: Airway patent, Nasal mucosa clear. Mouth with dry mucosa. Throat has no vesicles, no oropharyngeal exudates and uvula is midline.	  · EYES: Clear bilaterally, pupils equal, round and reactive to light.	  · CARDIAC: mild tachy 	  · RESPIRATORY: b.l euqal air entry no wheezing	  · GASTROINTESTINAL: Abdomen soft, non-tender, no guarding.	  · MUSCULOSKELETAL: Spine appears normal, range of motion is not limited, no muscle or joint tenderness	  · NEUROLOGICAL: Alert and oriented, no focal deficits, no motor or sensory deficits.	  · SKIN: Skin slightly pale; incisions in groin with sutures, clean dry intact and nontender without erythema.	  · PSYCHIATRIC: Alert and oriented to person, place, time/situation. flat affect

## 2020-03-06 NOTE — PROGRESS NOTE ADULT - PROBLEM SELECTOR PLAN 1
Dyspnea likely related to COPD/bronchitis and acute on chronic diastolic CHF.  Worsening symptoms last night requiring a STAT dose of IV Lasix; and increased daily dose.

## 2020-03-07 LAB
ANION GAP SERPL CALC-SCNC: 7 MMOL/L — SIGNIFICANT CHANGE UP (ref 5–17)
BUN SERPL-MCNC: 15 MG/DL — SIGNIFICANT CHANGE UP (ref 7–23)
CALCIUM SERPL-MCNC: 8.5 MG/DL — SIGNIFICANT CHANGE UP (ref 8.5–10.1)
CHLORIDE SERPL-SCNC: 99 MMOL/L — SIGNIFICANT CHANGE UP (ref 96–108)
CO2 SERPL-SCNC: 31 MMOL/L — SIGNIFICANT CHANGE UP (ref 22–31)
CREAT SERPL-MCNC: 0.84 MG/DL — SIGNIFICANT CHANGE UP (ref 0.5–1.3)
GLUCOSE SERPL-MCNC: 92 MG/DL — SIGNIFICANT CHANGE UP (ref 70–99)
HCT VFR BLD CALC: 28.2 % — LOW (ref 34.5–45)
HCT VFR BLD CALC: 29.4 % — LOW (ref 34.5–45)
HGB BLD-MCNC: 9.2 G/DL — LOW (ref 11.5–15.5)
HGB BLD-MCNC: 9.4 G/DL — LOW (ref 11.5–15.5)
MAGNESIUM SERPL-MCNC: 2.1 MG/DL — SIGNIFICANT CHANGE UP (ref 1.6–2.6)
MCHC RBC-ENTMCNC: 28.5 PG — SIGNIFICANT CHANGE UP (ref 27–34)
MCHC RBC-ENTMCNC: 32.6 GM/DL — SIGNIFICANT CHANGE UP (ref 32–36)
MCV RBC AUTO: 87.3 FL — SIGNIFICANT CHANGE UP (ref 80–100)
PLATELET # BLD AUTO: 435 K/UL — HIGH (ref 150–400)
POTASSIUM SERPL-MCNC: 3.5 MMOL/L — SIGNIFICANT CHANGE UP (ref 3.5–5.3)
POTASSIUM SERPL-SCNC: 3.5 MMOL/L — SIGNIFICANT CHANGE UP (ref 3.5–5.3)
RBC # BLD: 3.23 M/UL — LOW (ref 3.8–5.2)
RBC # FLD: 16.5 % — HIGH (ref 10.3–14.5)
SODIUM SERPL-SCNC: 137 MMOL/L — SIGNIFICANT CHANGE UP (ref 135–145)
WBC # BLD: 7.6 K/UL — SIGNIFICANT CHANGE UP (ref 3.8–10.5)
WBC # FLD AUTO: 7.6 K/UL — SIGNIFICANT CHANGE UP (ref 3.8–10.5)

## 2020-03-07 PROCEDURE — 99232 SBSQ HOSP IP/OBS MODERATE 35: CPT

## 2020-03-07 RX ORDER — HYDRALAZINE HCL 50 MG
25 TABLET ORAL EVERY 6 HOURS
Refills: 0 | Status: DISCONTINUED | OUTPATIENT
Start: 2020-03-07 | End: 2020-03-08

## 2020-03-07 RX ADMIN — PANTOPRAZOLE SODIUM 40 MILLIGRAM(S): 20 TABLET, DELAYED RELEASE ORAL at 05:57

## 2020-03-07 RX ADMIN — AMLODIPINE BESYLATE 5 MILLIGRAM(S): 2.5 TABLET ORAL at 18:35

## 2020-03-07 RX ADMIN — ATORVASTATIN CALCIUM 80 MILLIGRAM(S): 80 TABLET, FILM COATED ORAL at 20:42

## 2020-03-07 RX ADMIN — Medication 325 MILLIGRAM(S): at 11:16

## 2020-03-07 RX ADMIN — AMLODIPINE BESYLATE 5 MILLIGRAM(S): 2.5 TABLET ORAL at 09:04

## 2020-03-07 RX ADMIN — Medication 25 MILLIGRAM(S): at 23:43

## 2020-03-07 RX ADMIN — AMIODARONE HYDROCHLORIDE 200 MILLIGRAM(S): 400 TABLET ORAL at 06:51

## 2020-03-07 RX ADMIN — Medication 25 MILLIGRAM(S): at 17:29

## 2020-03-07 RX ADMIN — Medication 25 MILLIGRAM(S): at 05:56

## 2020-03-07 RX ADMIN — Medication 81 MILLIGRAM(S): at 11:17

## 2020-03-07 RX ADMIN — Medication 100 MILLIGRAM(S): at 05:56

## 2020-03-07 RX ADMIN — Medication 650 MILLIGRAM(S): at 20:42

## 2020-03-07 RX ADMIN — Medication 1 TABLET(S): at 11:17

## 2020-03-07 RX ADMIN — CLOPIDOGREL BISULFATE 75 MILLIGRAM(S): 75 TABLET, FILM COATED ORAL at 11:17

## 2020-03-07 RX ADMIN — MAGNESIUM OXIDE 400 MG ORAL TABLET 400 MILLIGRAM(S): 241.3 TABLET ORAL at 11:17

## 2020-03-07 RX ADMIN — Medication 40 MILLIGRAM(S): at 05:56

## 2020-03-07 RX ADMIN — Medication 650 MILLIGRAM(S): at 00:16

## 2020-03-07 RX ADMIN — Medication 40 MILLIGRAM(S): at 17:29

## 2020-03-07 RX ADMIN — Medication 1 MILLIGRAM(S): at 11:17

## 2020-03-07 RX ADMIN — Medication 10 MILLIEQUIVALENT(S): at 11:17

## 2020-03-07 RX ADMIN — LOSARTAN POTASSIUM 100 MILLIGRAM(S): 100 TABLET, FILM COATED ORAL at 05:56

## 2020-03-07 RX ADMIN — Medication 3 MILLILITER(S): at 13:36

## 2020-03-07 NOTE — PROGRESS NOTE ADULT - SUBJECTIVE AND OBJECTIVE BOX
79 yo Female with a hx of anemia, CAD on plavix, MI, anemia, HTN, hyperlipidemia, lung cancer in remission s/p resection; atrial fibrillation (off eliquis for 1 month due to GI bleeding); GERD ; post op day 4 after aorto-femoral bypass surgery at [facility] now presenting with chest pressure intermittently with shortness of breath and wet cough.  Patient states she had URI upon admission to [facility] and was told she had humanmetapneumo virus.  Patient underwent surgery and was discharged yesterday afternoon to [facility].  Patient had swelling of legs post surgery and had dopplers done which were negative for DVT prior to discharge.  Patient denies fever, chills, back pain. No fever.       03/02/20: Patient seen and examined. SOB improving, diuresing well. Discussed with patient regarding management and d/c plan.   03/03/20: Patient seen and examined. SOB improving. Feels weak and tired.   03/04/20: Patient seen and examined. Complaining of tiredness and dizziness.  03/05/20: Patient seen and examined. SOB improving, received extra dose of lasix last night due to sob. BP still high. Hydralazine was increased.   3/6- ambualting in hallway, feels tired, mild dyspnea on exertion, improving  3/7 - had a large dark BM and felt lightheaded afterwards, H&H stable and FOBT pending, drop in BP on standing but BP still high on standing so unlikley to cause lightheadedness. likley vasovagal after BM/look for drop in H&H; no cp palps sob      Physical Exam:       · ENMT: Airway patent, Nasal mucosa clear. Mouth with dry mucosa. Throat has no vesicles, no oropharyngeal exudates and uvula is midline.	  · EYES: Clear bilaterally, pupils equal, round and reactive to light.	  · CARDIAC: mild tachy 	  · RESPIRATORY: b.l euqal air entry no wheezing	  · GASTROINTESTINAL: Abdomen soft, non-tender, no guarding.	  · MUSCULOSKELETAL: Spine appears normal, range of motion is not limited, no muscle or joint tenderness	  · NEUROLOGICAL: Alert and oriented, no focal deficits, no motor or sensory deficits.	  · SKIN: Skin slightly pale; incisions in groin with sutures, clean dry intact and nontender without erythema.	  · PSYCHIATRIC: Alert and oriented to person, place, time/situation. flat affect	  	                         LABS: All Labs Reviewed:                        9.4    x     )-----------( x        ( 07 Mar 2020 14:01 )             29.4     03-07    137  |  99  |  15  ----------------------------<  92  3.5   |  31  |  0.84    Ca    8.5      07 Mar 2020 06:32  Mg     2.1     03-07        acetaminophen   Tablet .. 650 milliGRAM(s) Oral every 6 hours PRN  albuterol/ipratropium for Nebulization 3 milliLiter(s) Nebulizer every 6 hours  aMIOdarone    Tablet 200 milliGRAM(s) Oral daily  amLODIPine   Tablet 5 milliGRAM(s) Oral <User Schedule>  aspirin enteric coated 81 milliGRAM(s) Oral daily  atorvastatin 80 milliGRAM(s) Oral at bedtime  benzocaine 15 mG/menthol 3.6 mG (Sugar-Free) Lozenge 1 Lozenge Oral every 4 hours PRN  clopidogrel Tablet 75 milliGRAM(s) Oral daily  ferrous    sulfate 325 milliGRAM(s) Oral daily  folic acid 1 milliGRAM(s) Oral daily  furosemide    Tablet 40 milliGRAM(s) Oral two times a day  guaiFENesin   Syrup  (Sugar-Free) 200 milliGRAM(s) Oral every 6 hours PRN  hydrALAZINE 25 milliGRAM(s) Oral every 6 hours  losartan 100 milliGRAM(s) Oral daily  magnesium oxide 400 milliGRAM(s) Oral daily  metoprolol succinate  milliGRAM(s) Oral daily  multivitamin 1 Tablet(s) Oral daily  ondansetron Injectable 4 milliGRAM(s) IV Push every 6 hours PRN  oxyCODONE    IR 5 milliGRAM(s) Oral every 8 hours PRN  pantoprazole    Tablet 40 milliGRAM(s) Oral before breakfast  potassium chloride    Tablet ER 10 milliEquivalent(s) Oral daily  senna 2 Tablet(s) Oral at bedtime

## 2020-03-07 NOTE — PROGRESS NOTE ADULT - ASSESSMENT
81 yo Female presented with shortness of breath.     Acute diastolic CHF exacerbation  Pleural effusion  CAD  -S/P IV lasix, changed to po. Dizziness possibly due to IV lasix   -follow Is and Os  -daily weight  -continue aspirin, statin, plavix, bb, ARB    HTN: not optimally controlled  -Hydralazine increased  Pt refused to take some meds today as she felt lightheaded    Lightheadedness, r/o GI bleed   had a large dark BM and felt lightheaded afterwards, H&H stable and FOBT pending, Orthostats done - drop in BP on standing but BP still high on standing so unlikley to cause lightheadedness. likley vasovagal after BM/look for drop in H&H;       HLD  -on statin    Hypokalemia due to lasix  Replaced, resolved  Follow while on lasix      Chr A Fib  Continue lopressor and amiodarone  Not on AC due to GI bleed    Disposition: PT silvia appreciated  Possible d/c back to rehab on MONDAY  discussed with pt's PCP/card Dr Obrien previously  discussed with RN

## 2020-03-08 ENCOUNTER — TRANSCRIPTION ENCOUNTER (OUTPATIENT)
Age: 80
End: 2020-03-08

## 2020-03-08 VITALS — HEART RATE: 61 BPM | DIASTOLIC BLOOD PRESSURE: 46 MMHG | SYSTOLIC BLOOD PRESSURE: 172 MMHG

## 2020-03-08 LAB
ANION GAP SERPL CALC-SCNC: 8 MMOL/L — SIGNIFICANT CHANGE UP (ref 5–17)
BUN SERPL-MCNC: 19 MG/DL — SIGNIFICANT CHANGE UP (ref 7–23)
CALCIUM SERPL-MCNC: 8.6 MG/DL — SIGNIFICANT CHANGE UP (ref 8.5–10.1)
CHLORIDE SERPL-SCNC: 101 MMOL/L — SIGNIFICANT CHANGE UP (ref 96–108)
CO2 SERPL-SCNC: 27 MMOL/L — SIGNIFICANT CHANGE UP (ref 22–31)
CREAT SERPL-MCNC: 1.13 MG/DL — SIGNIFICANT CHANGE UP (ref 0.5–1.3)
GLUCOSE SERPL-MCNC: 132 MG/DL — HIGH (ref 70–99)
HCT VFR BLD CALC: 30.3 % — LOW (ref 34.5–45)
HCT VFR BLD CALC: 32.2 % — LOW (ref 34.5–45)
HGB BLD-MCNC: 9.8 G/DL — LOW (ref 11.5–15.5)
HGB BLD-MCNC: 9.8 G/DL — LOW (ref 11.5–15.5)
MAGNESIUM SERPL-MCNC: 2.2 MG/DL — SIGNIFICANT CHANGE UP (ref 1.6–2.6)
MCHC RBC-ENTMCNC: 26.9 PG — LOW (ref 27–34)
MCHC RBC-ENTMCNC: 30.4 GM/DL — LOW (ref 32–36)
MCV RBC AUTO: 88.5 FL — SIGNIFICANT CHANGE UP (ref 80–100)
OB PNL STL: POSITIVE
PLATELET # BLD AUTO: 490 K/UL — HIGH (ref 150–400)
POTASSIUM SERPL-MCNC: 3.5 MMOL/L — SIGNIFICANT CHANGE UP (ref 3.5–5.3)
POTASSIUM SERPL-SCNC: 3.5 MMOL/L — SIGNIFICANT CHANGE UP (ref 3.5–5.3)
RBC # BLD: 3.64 M/UL — LOW (ref 3.8–5.2)
RBC # FLD: 16.8 % — HIGH (ref 10.3–14.5)
SODIUM SERPL-SCNC: 136 MMOL/L — SIGNIFICANT CHANGE UP (ref 135–145)
WBC # BLD: 8.02 K/UL — SIGNIFICANT CHANGE UP (ref 3.8–10.5)
WBC # FLD AUTO: 8.02 K/UL — SIGNIFICANT CHANGE UP (ref 3.8–10.5)

## 2020-03-08 PROCEDURE — 93010 ELECTROCARDIOGRAM REPORT: CPT

## 2020-03-08 PROCEDURE — 99239 HOSP IP/OBS DSCHRG MGMT >30: CPT

## 2020-03-08 RX ORDER — PANTOPRAZOLE SODIUM 20 MG/1
40 TABLET, DELAYED RELEASE ORAL
Qty: 0 | Refills: 0 | DISCHARGE
Start: 2020-03-08

## 2020-03-08 RX ORDER — HYDRALAZINE HCL 50 MG
1 TABLET ORAL
Qty: 0 | Refills: 0 | DISCHARGE

## 2020-03-08 RX ORDER — PANTOPRAZOLE SODIUM 20 MG/1
40 TABLET, DELAYED RELEASE ORAL DAILY
Refills: 0 | Status: DISCONTINUED | OUTPATIENT
Start: 2020-03-08 | End: 2020-03-08

## 2020-03-08 RX ORDER — IPRATROPIUM/ALBUTEROL SULFATE 18-103MCG
3 AEROSOL WITH ADAPTER (GRAM) INHALATION
Qty: 0 | Refills: 0 | DISCHARGE
Start: 2020-03-08

## 2020-03-08 RX ORDER — BENZOCAINE AND MENTHOL 5; 1 G/100ML; G/100ML
1 LIQUID ORAL
Qty: 0 | Refills: 0 | DISCHARGE

## 2020-03-08 RX ORDER — OXYCODONE HYDROCHLORIDE 5 MG/1
1 TABLET ORAL
Qty: 0 | Refills: 0 | DISCHARGE

## 2020-03-08 RX ADMIN — CLOPIDOGREL BISULFATE 75 MILLIGRAM(S): 75 TABLET, FILM COATED ORAL at 11:31

## 2020-03-08 RX ADMIN — PANTOPRAZOLE SODIUM 40 MILLIGRAM(S): 20 TABLET, DELAYED RELEASE ORAL at 18:41

## 2020-03-08 RX ADMIN — Medication 25 MILLIGRAM(S): at 05:49

## 2020-03-08 RX ADMIN — Medication 1 MILLIGRAM(S): at 11:31

## 2020-03-08 RX ADMIN — LOSARTAN POTASSIUM 100 MILLIGRAM(S): 100 TABLET, FILM COATED ORAL at 05:46

## 2020-03-08 RX ADMIN — MAGNESIUM OXIDE 400 MG ORAL TABLET 400 MILLIGRAM(S): 241.3 TABLET ORAL at 11:31

## 2020-03-08 RX ADMIN — Medication 325 MILLIGRAM(S): at 11:31

## 2020-03-08 RX ADMIN — AMIODARONE HYDROCHLORIDE 200 MILLIGRAM(S): 400 TABLET ORAL at 05:49

## 2020-03-08 RX ADMIN — Medication 10 MILLIEQUIVALENT(S): at 11:31

## 2020-03-08 RX ADMIN — Medication 100 MILLIGRAM(S): at 05:46

## 2020-03-08 RX ADMIN — AMLODIPINE BESYLATE 5 MILLIGRAM(S): 2.5 TABLET ORAL at 18:28

## 2020-03-08 RX ADMIN — Medication 1 TABLET(S): at 11:31

## 2020-03-08 RX ADMIN — Medication 81 MILLIGRAM(S): at 11:31

## 2020-03-08 RX ADMIN — PANTOPRAZOLE SODIUM 40 MILLIGRAM(S): 20 TABLET, DELAYED RELEASE ORAL at 05:47

## 2020-03-08 RX ADMIN — Medication 650 MILLIGRAM(S): at 15:09

## 2020-03-08 RX ADMIN — Medication 40 MILLIGRAM(S): at 05:46

## 2020-03-08 NOTE — DISCHARGE NOTE PROVIDER - HOSPITAL COURSE
81 yo Female with a hx of anemia, CAD on plavix, MI, anemia, HTN, hyperlipidemia, lung cancer in remission s/p resection; atrial fibrillation (off eliquis for 1 month due to GI bleeding); GERD  recent aorto-femoral bypass surgery at [facility] presented  with chest pressure intermittently with shortness of breath and wet cough.  Patient  had URI upon admission to [facility] and was told she had human metapneumovirus.  Patient underwent surgery and was discharged to [facility], she reported that had swelling of legs post surgery and had dopplers done which were negative for DVT prior to discharge.  No  fever, chills, back pain. No fever.     INTERVAL HPI/ OVERNIGHT EVENTS: Chart reviewed, Pt was seen and examined, family at bedside, Pt reports that still feels lightheaded with ambulation,  had large black BM yesterday with vasovagal reaction. Was lightheaded in the bathroom this am as well. No CP.  Occult blood +     POC d/w Pt, family and Pt request to be transferred to [facility], also requesting me  to contact Dr Schmidt      Vital Signs Last 24 Hrs    T(C): 36.7 (08 Mar 2020 11:19), Max: 36.7 (07 Mar 2020 21:01)    T(F): 98 (08 Mar 2020 11:19), Max: 98.1 (07 Mar 2020 21:01)    HR: 61 (08 Mar 2020 18:13) (59 - 71)    BP: 172/46 (08 Mar 2020 18:13) (134/33 - 172/46)    RR: 17 (08 Mar 2020 11:19) (16 - 17)    SpO2: 96% (08 Mar 2020 11:19) (96% - 98%)        PHYSICAL EXAM:    General: Well developed;  in no acute distress    Eyes: PERRLA, EOMI; conjunctiva and sclera clear    Head: Normocephalic; atraumatic    ENMT: No nasal discharge; airway clear    Neck: Supple; non tender; no masses    Respiratory: Decreased BS at bases, Mild rales at LLL    Cardiovascular: Regular rate and rhythm. S1 and S2 Normal; No murmurs    Gastrointestinal: Soft non-tender non-distended; Normal bowel sounds    Genitourinary: No  suprapubic  tenderness    Extremities: Normal range of motion, trace  edema    Vascular: Peripheral pulses palpable 2+ bilaterally. B/l groin incisions healing well.     Neurological: Alert and oriented x4, non focal     Skin: Warm and dry. No acute rash    Lymph Nodes: No acute cervical adenopathy    Musculoskeletal: Normal muscle tone, no joint edema or tenderness     Psychiatric: Cooperative            A/P:     81 yo Female with a hx of anemia, CAD on plavix, MI, anemia, HTN, hyperlipidemia, lung cancer in remission s/p resection; atrial fibrillation (off Eliquis for over 1month due to GI bleeding); GERD, recent  aorto-femoral bypass surgery admitted for:         1. Chest pressure. Acute diastolic CHF exacerbation and acute bronchitis     Trop neg, BNP elevated    EKG: SR with PVCs, lateral ST changes     S/P IV lasix, changed to po, will hold as Pt  feels lightheaded     monitor Is and Os    daily weight    ECHO 3/6: preserved EF +2MR         2.  Chronic Anemia, Melena. Suspect  upper GI bleed      had episode of melena, H/H remains stable     No further episodes    HAd episode of GI bleed few months ago and had colonoscopy which was neg for bleeding, polyps removed. Off Eliquis since then     Start IVP  Pantoprazole    Monitor H/H  closely and transfuse PRN     GI eval         3. 3 vessel CAD, h/o recent NSTEMI, s/p stent placed     Trop neg this admission,  No further CP     On ASA/Plavix for now     C/w Losartan and Toprol, Statin    D/w Cardio, plan was  for eventual intervention when stable         4. HTN    has elevated SBP with low DBP    D/w Dr Luna, possibly in combination of diuresis and hypovolemia?     Hold lasix for now, d/c Hydralazine         5. HLD    -on statin        6. Chr A Fib    Continue lopressor and amiodarone    Not on AC  for over 1 month, hold for now, as suspicious for GI bleed         7. Lung nodular  opacity     as per CT chest review    Recommended f/u with repeat CT vs PET     need to F/u with PCP or Pulm outPt as pt has h/o Lung CA         8. DVT PP: venodynes         DR Parekh was contacted, case discussed over the phone, plan to transfer to [facility] fro further management         Dispo:  Transfer to [facility] when arranged, under Dr Schmidt             Total time 40 min

## 2020-03-08 NOTE — DISCHARGE NOTE PROVIDER - CARE PROVIDER_API CALL
Delvin Alcala)  Internal Medicine; Pulmonary Disease  [address] [place] [address]  Phone: [phone]  Fax: [phone]  Follow Up Time: 2 weeks

## 2020-03-08 NOTE — DISCHARGE NOTE PROVIDER - NSDCCPCAREPLAN_GEN_ALL_CORE_FT
PRINCIPAL DISCHARGE DIAGNOSIS  Diagnosis: Opacity of lung on imaging study  Assessment and Plan of Treatment: needs f/u woth Pulm repeat Ct vs PET      SECONDARY DISCHARGE DIAGNOSES  Diagnosis: Pleural effusion  Assessment and Plan of Treatment:

## 2020-03-08 NOTE — PROGRESS NOTE ADULT - ASSESSMENT
79 yo Female with a hx of anemia, CAD on plavix, MI, anemia, HTN, hyperlipidemia, lung cancer in remission s/p resection; atrial fibrillation (off Eliquis for over 1month due to GI bleeding); GERD, recent  aorto-femoral bypass surgery admitted for:       1. Chest pressure. Acute diastolic CHF exacerbation and acute bronchitis   Trop neg, BNP elevated  EKG: SR with PVCs, lateral ST changes   S/P IV lasix, changed to po, will hold as Pt  feels lightheaded   monitor Is and Os  daily weight  ECHO 3/6: preserved EF +2MR       2. Anemia, Melena. Suspect  upper GI bleed    had episode of melena, H/H remains stable   No further episodes  HAd episode of GI bleed few months ago and had colonoscopy which was neg for bleeding, polyps removed. Off Eliquis since then   Start IVP  Pantoprazole  Monitor H/H  closely and transfuse PRN   GI eval       3. 3 vessel CAD, h/o recent NSTEMI, s/p stent placed   Trop neg this admission,  No further CP   On ASA/Plavix for now   C/w Losartan and Toprol, Statin  D/w Cardio, plan was  for eventual intervention when stable         4. HTN  has elevated SBP with low DBP  D/w Dr Luna, possibly in combination of diuresis and hypovolemia?   Hold lasix for now, d/c Hydralazine         5. HLD  -on statin    6. Chr A Fib  Continue lopressor and amiodarone  Not on AC  for over 1 month, hold for now, as suspicious for GI bleed       7. Lung nodular  opacity   as per CT chest review  Recommended f/u with repeat CT vs PET   need to F/u with PCP or Pulm outPt as pt has h/o Lung CA       8. DVT PP: venodynes     DR Parekh was contacted, case discussed over the phone, plan to transfer to [facility] fro further management     Dispo:  Transfer to [facility] when arranged, under Dr Schmidt

## 2020-03-08 NOTE — DISCHARGE NOTE PROVIDER - NSDCMRMEDTOKEN_GEN_ALL_CORE_FT
amiodarone 200 mg oral tablet: 1 tab(s) orally once a day  amLODIPine 5 mg oral tablet: 1 tab(s) orally once a day  aspirin 81 mg oral tablet: 1 tab(s) orally once a day  atorvastatin 80 mg oral tablet: 1 tab(s) orally once a day  clopidogrel 75 mg oral tablet: 1 tab(s) orally once a day  folic acid 1 mg oral tablet: 1 tab(s) orally once a day  guaiFENesin 1200 mg oral tablet, extended release: 1 tab(s) orally every 12 hours  ipratropium-albuterol 0.5 mg-2.5 mg/3 mLinhalation solution: 3 milliliter(s) inhaled every 6 hours  losartan 100 mg oral tablet: 1 tab(s) orally once a day  magnesium oxide 400 mg (240 mg elemental magnesium) oral tablet: 1 tab(s) orally once a day  metoprolol succinate 100 mg oral tablet, extended release: 1 tab(s) orally once a day  Multiple Vitamins oral tablet: 1 tab(s) orally once a day  pantoprazole 40 mg intravenous injection: 40 milligram(s) intravenous once a day  Senna 8.6 mg oral tablet: 2 tab(s) orally once a day (at bedtime)

## 2020-03-08 NOTE — PROGRESS NOTE ADULT - REASON FOR ADMISSION
complain of shortness of breath

## 2020-03-08 NOTE — DISCHARGE NOTE NURSING/CASE MANAGEMENT/SOCIAL WORK - PATIENT PORTAL LINK FT
You can access the FollowMyHealth Patient Portal offered by [facility] by registering at the following website: [email or web address]. By joining [employer]’s FollowMyHealth portal, you will also be able to view your health information using other applications (apps) compatible with our system.

## 2020-03-08 NOTE — PROGRESS NOTE ADULT - SUBJECTIVE AND OBJECTIVE BOX
CC: complain of shortness of breath (07 Mar 2020 23:08)    HPI:  81 yo Female with a hx of anemia, CAD on plavix, MI, anemia, HTN, hyperlipidemia, lung cancer in remission s/p resection; atrial fibrillation (off eliquis for 1 month due to GI bleeding); GERD ; post op day 4 after aorto-femoral bypass surgery at [facility] now presenting with chest pressure intermittently with shortness of breath and wet cough.  Patient states she had URI upon admission to [facility] and was told she had humanmetapneumo virus.  Patient underwent surgery and was discharged yesterday afternoon to [facility].  Patient had swelling of legs post surgery and had dopplers done which were negative for DVT prior to discharge.  Patient denies fever, chills, back pain. No fever. (01 Mar 2020 05:49)    INTERVAL HPI/OVERNIGHT EVENTS:    Vital Signs Last 24 Hrs  T(C): 36.7 (08 Mar 2020 11:19), Max: 36.7 (07 Mar 2020 21:01)  T(F): 98 (08 Mar 2020 11:19), Max: 98.1 (07 Mar 2020 21:01)  HR: 63 (08 Mar 2020 11:19) (57 - 71)  BP: 160/32 (08 Mar 2020 16:33) (134/33 - 160/32)  BP(mean): --  RR: 17 (08 Mar 2020 11:19) (16 - 17)  SpO2: 96% (08 Mar 2020 11:19) (96% - 98%)  I&O's Detail    REVIEW OF SYSTEMS:    CONSTITUTIONAL: No weakness, fevers or chills  EYES/ENT: No visual changes;  No vertigo or throat pain   NECK: No pain or stiffness  RESPIRATORY: No cough, wheezing, hemoptysis; No shortness of breath  CARDIOVASCULAR: No chest pain or palpitations  GASTROINTESTINAL: No abdominal or epigastric pain. No nausea, vomiting, or hematemesis; No diarrhea or constipation. No melena or hematochezia.  GENITOURINARY: No dysuria, frequency or hematuria  NEUROLOGICAL: No numbness or weakness  SKIN: No itching, burning, rashes, or lesions   All other review of systems is negative unless indicated above.  PHYSICAL EXAM:    General: Well developed; well nourished; in no acute distress  Eyes: PERRLA, EOMI; conjunctiva and sclera clear  Head: Normocephalic; atraumatic  ENMT: No nasal discharge; airway clear  Neck: Supple; non tender; no masses  Respiratory: No wheezes, rales or rhonchi  Cardiovascular: Regular rate and rhythm. S1 and S2 Normal; No murmurs, gallops or rubs  Gastrointestinal: Soft non-tender non-distended; Normal bowel sounds  Genitourinary: No  suprapubic  tenderness  Extremities: Normal range of motion, No clubbing, cyanosis or edema  Vascular: Peripheral pulses palpable 2+ bilaterally  Neurological: Alert and oriented x4  Skin: Warm and dry. No acute rash  Lymph Nodes: No acute cervical adenopathy  Musculoskeletal: Normal muscle tone, without deformities  Psychiatric: Cooperative and appropriate                            9.8    8.02  )-----------( 490      ( 08 Mar 2020 09:10 )             32.2     08 Mar 2020 09:10    136    |  101    |  19     ----------------------------<  132    3.5     |  27     |  1.13     Ca    8.6        08 Mar 2020 09:10  Mg     2.2       08 Mar 2020 09:10        CAPILLARY BLOOD GLUCOSE              MEDICATIONS  (STANDING):  albuterol/ipratropium for Nebulization 3 milliLiter(s) Nebulizer every 6 hours  aMIOdarone    Tablet 200 milliGRAM(s) Oral daily  amLODIPine   Tablet 5 milliGRAM(s) Oral <User Schedule>  aspirin enteric coated 81 milliGRAM(s) Oral daily  atorvastatin 80 milliGRAM(s) Oral at bedtime  clopidogrel Tablet 75 milliGRAM(s) Oral daily  ferrous    sulfate 325 milliGRAM(s) Oral daily  folic acid 1 milliGRAM(s) Oral daily  hydrALAZINE 25 milliGRAM(s) Oral every 6 hours  losartan 100 milliGRAM(s) Oral daily  magnesium oxide 400 milliGRAM(s) Oral daily  metoprolol succinate  milliGRAM(s) Oral daily  multivitamin 1 Tablet(s) Oral daily  pantoprazole    Tablet 40 milliGRAM(s) Oral before breakfast  potassium chloride    Tablet ER 10 milliEquivalent(s) Oral daily  senna 2 Tablet(s) Oral at bedtime    MEDICATIONS  (PRN):  acetaminophen   Tablet .. 650 milliGRAM(s) Oral every 6 hours PRN Mild Pain (1 - 3)  benzocaine 15 mG/menthol 3.6 mG (Sugar-Free) Lozenge 1 Lozenge Oral every 4 hours PRN Sore Throat  guaiFENesin   Syrup  (Sugar-Free) 200 milliGRAM(s) Oral every 6 hours PRN Cough  ondansetron Injectable 4 milliGRAM(s) IV Push every 6 hours PRN Nausea and/or Vomiting  oxyCODONE    IR 5 milliGRAM(s) Oral every 8 hours PRN Moderate Pain (4 - 6)      RADIOLOGY & ADDITIONAL TESTS:

## 2020-03-08 NOTE — PROGRESS NOTE ADULT - SUBJECTIVE AND OBJECTIVE BOX
CC: complain of shortness of breath (08 Mar 2020 16:39)    HPI: 79 yo Female with a hx of anemia, CAD on plavix, MI, anemia, HTN, hyperlipidemia, lung cancer in remission s/p resection; atrial fibrillation (off eliquis for 1 month due to GI bleeding); GERD  recent aorto-femoral bypass surgery at [facility] presented  with chest pressure intermittently with shortness of breath and wet cough.  Patient  had URI upon admission to [facility] and was told she had human metapneumovirus.  Patient underwent surgery and was discharged to [facility], she reported that had swelling of legs post surgery and had dopplers done which were negative for DVT prior to discharge.  No  fever, chills, back pain. No fever.     INTERVAL HPI/ OVERNIGHT EVENTS: Chart reviewed, Pt was seen and examined, family at bedside, Pt reports that still feels lightheaded with ambulation,  had large black BM yesterday with vasovagal reaction. Was lightheaded in the bathroom this am as well. No CP.  Occult blood +   POC d/w Pt, family and Pt request to be transferred to [facility], also requesting me  to contact Dr Schmidt      Vital Signs Last 24 Hrs  T(C): 36.7 (08 Mar 2020 11:19), Max: 36.7 (07 Mar 2020 21:01)  T(F): 98 (08 Mar 2020 11:19), Max: 98.1 (07 Mar 2020 21:01)  HR: 61 (08 Mar 2020 18:13) (57 - 71)  BP: 172/46 (08 Mar 2020 18:13) (134/33 - 172/46)  RR: 17 (08 Mar 2020 11:19) (16 - 17)  SpO2: 96% (08 Mar 2020 11:19) (96% - 98%)      REVIEW OF SYSTEMS:  All other review of systems is negative unless indicated above.      PHYSICAL EXAM:  General: Well developed;  in no acute distress  Eyes: PERRLA, EOMI; conjunctiva and sclera clear  Head: Normocephalic; atraumatic  ENMT: No nasal discharge; airway clear  Neck: Supple; non tender; no masses  Respiratory: Decreased BS at bases, Mild rales at LLL  Cardiovascular: Regular rate and rhythm. S1 and S2 Normal; No murmurs  Gastrointestinal: Soft non-tender non-distended; Normal bowel sounds  Genitourinary: No  suprapubic  tenderness  Extremities: Normal range of motion, trace  edema  Vascular: Peripheral pulses palpable 2+ bilaterally. B/l groin incisions healing well.   Neurological: Alert and oriented x4, non focal   Skin: Warm and dry. No acute rash  Lymph Nodes: No acute cervical adenopathy  Musculoskeletal: Normal muscle tone, no joint edema or tenderness   Psychiatric: Cooperative        LABS:                         9.8    x     )-----------( x        ( 08 Mar 2020 17:15 )             30.3     08 Mar 2020 09:10    136    |  101    |  19     ----------------------------<  132    3.5     |  27     |  1.13     Ca    8.6        08 Mar 2020 09:10  Mg     2.2       08 Mar 2020 09:10          MEDICATIONS  (STANDING):  albuterol/ipratropium for Nebulization 3 milliLiter(s) Nebulizer every 6 hours  aMIOdarone    Tablet 200 milliGRAM(s) Oral daily  amLODIPine   Tablet 5 milliGRAM(s) Oral <User Schedule>  aspirin enteric coated 81 milliGRAM(s) Oral daily  atorvastatin 80 milliGRAM(s) Oral at bedtime  clopidogrel Tablet 75 milliGRAM(s) Oral daily  ferrous    sulfate 325 milliGRAM(s) Oral daily  folic acid 1 milliGRAM(s) Oral daily  losartan 100 milliGRAM(s) Oral daily  magnesium oxide 400 milliGRAM(s) Oral daily  metoprolol succinate  milliGRAM(s) Oral daily  multivitamin 1 Tablet(s) Oral daily  pantoprazole    Tablet 40 milliGRAM(s) Oral before breakfast  potassium chloride    Tablet ER 10 milliEquivalent(s) Oral daily  senna 2 Tablet(s) Oral at bedtime    MEDICATIONS  (PRN):  acetaminophen   Tablet .. 650 milliGRAM(s) Oral every 6 hours PRN Mild Pain (1 - 3)  benzocaine 15 mG/menthol 3.6 mG (Sugar-Free) Lozenge 1 Lozenge Oral every 4 hours PRN Sore Throat  guaiFENesin   Syrup  (Sugar-Free) 200 milliGRAM(s) Oral every 6 hours PRN Cough  ondansetron Injectable 4 milliGRAM(s) IV Push every 6 hours PRN Nausea and/or Vomiting  oxyCODONE    IR 5 milliGRAM(s) Oral every 8 hours PRN Moderate Pain (4 - 6)      RADIOLOGY & ADDITIONAL TESTS:    EXAM:  XR CHEST PORTABLE IMMED 1V                        PROCEDURE DATE:  03/04/2020      COMPARISON: 3/1/2020 available for review.    FINDINGS:    The lungs  are clear.  No pleural abnormality is seen.    The heart and mediastinum are within normal limits.  Aortic arch calcified.  Visualized osseous structures are intact.    IMPRESSION:   No evidence of active chest disease.           EXAM:  ECHO TTE WO CON COMP W DOP    PROCEDURE DATE:  01/06/2020       Summary     The left ventricle is normal in size, wall thickness, wall motion and   contractility.   Estimated left ventricular ejection fraction is 60-65 %.   The left atrium is mildly dilated.   Normal appearing right ventricle structure and function.   Fibrocalcific changes noted to the Aortic valve leaflets with preserved   leaflet excursion.   Mild mitral annular calcification is present.   The mitral valve leaflets appear thickened with mildly restricted leaflet   excursion.   Moderate (2+) mitral regurgitation is present.   No evidence of pericardial effusion.

## 2020-03-12 DIAGNOSIS — K27.9 PEPTIC ULCER, SITE UNSPECIFIED, UNSPECIFIED AS ACUTE OR CHRONIC, WITHOUT HEMORRHAGE OR PERFORATION: ICD-10-CM

## 2020-03-12 DIAGNOSIS — Z79.82 LONG TERM (CURRENT) USE OF ASPIRIN: ICD-10-CM

## 2020-03-12 DIAGNOSIS — I11.0 HYPERTENSIVE HEART DISEASE WITH HEART FAILURE: ICD-10-CM

## 2020-03-12 DIAGNOSIS — I25.10 ATHEROSCLEROTIC HEART DISEASE OF NATIVE CORONARY ARTERY WITHOUT ANGINA PECTORIS: ICD-10-CM

## 2020-03-12 DIAGNOSIS — K21.9 GASTRO-ESOPHAGEAL REFLUX DISEASE WITHOUT ESOPHAGITIS: ICD-10-CM

## 2020-03-12 DIAGNOSIS — E78.5 HYPERLIPIDEMIA, UNSPECIFIED: ICD-10-CM

## 2020-03-12 DIAGNOSIS — I48.20 CHRONIC ATRIAL FIBRILLATION, UNSPECIFIED: ICD-10-CM

## 2020-03-12 DIAGNOSIS — Z79.891 LONG TERM (CURRENT) USE OF OPIATE ANALGESIC: ICD-10-CM

## 2020-03-12 DIAGNOSIS — I50.43 ACUTE ON CHRONIC COMBINED SYSTOLIC (CONGESTIVE) AND DIASTOLIC (CONGESTIVE) HEART FAILURE: ICD-10-CM

## 2020-03-12 DIAGNOSIS — E87.6 HYPOKALEMIA: ICD-10-CM

## 2020-03-12 DIAGNOSIS — I48.0 PAROXYSMAL ATRIAL FIBRILLATION: ICD-10-CM

## 2020-03-12 DIAGNOSIS — D64.9 ANEMIA, UNSPECIFIED: ICD-10-CM

## 2020-03-12 DIAGNOSIS — Z87.891 PERSONAL HISTORY OF NICOTINE DEPENDENCE: ICD-10-CM

## 2020-03-12 DIAGNOSIS — K92.1 MELENA: ICD-10-CM

## 2020-03-12 DIAGNOSIS — J44.0 CHRONIC OBSTRUCTIVE PULMONARY DISEASE WITH (ACUTE) LOWER RESPIRATORY INFECTION: ICD-10-CM

## 2020-03-12 DIAGNOSIS — Z85.118 PERSONAL HISTORY OF OTHER MALIGNANT NEOPLASM OF BRONCHUS AND LUNG: ICD-10-CM

## 2020-03-12 DIAGNOSIS — Z86.010 PERSONAL HISTORY OF COLONIC POLYPS: ICD-10-CM

## 2020-03-12 DIAGNOSIS — Z90.2 ACQUIRED ABSENCE OF LUNG [PART OF]: ICD-10-CM

## 2020-03-12 DIAGNOSIS — J40 BRONCHITIS, NOT SPECIFIED AS ACUTE OR CHRONIC: ICD-10-CM

## 2020-03-12 DIAGNOSIS — I25.2 OLD MYOCARDIAL INFARCTION: ICD-10-CM

## 2020-03-12 DIAGNOSIS — Z88.8 ALLERGY STATUS TO OTHER DRUGS, MEDICAMENTS AND BIOLOGICAL SUBSTANCES STATUS: ICD-10-CM

## 2020-05-29 NOTE — ED PROVIDER NOTE - CROS ED [facility] ALL NEG
-- DO NOT REPLY / DO NOT REPLY ALL --  -- Message is from the [facility]--    COVID-19 Universal Screening: Negative    General Patient Message      Reason for Call: Patient is requesting for his Lantus insulin to be re sent to [facility] pharmacy, he only has 2 days left &  Pharmacy has not received any answers from office.    Caller Information       Type Contact Phone    05/29/2020 01:00 PM Phone (Incoming) Te Malhotra (Self) [phone] (M)          Alternative phone number: n/a     Turnaround time given to caller:   \"This message will be sent to [state Provider's name]. The clinical team will fulfill your request as soon as they review your message.\"    
Spoke with patient by phone. Patient spoke with Pharmacy. Rx for Lantus insulin available for .  
negative...

## 2020-06-04 NOTE — ED PROVIDER NOTE - NS ED MD TWO NIGHTS YN
Complex Repair And O-L Flap Text: The defect edges were debeveled with a #15 scalpel blade.  The primary defect was closed partially with a complex linear closure.  Given the location of the remaining defect, shape of the defect and the proximity to free margins an O-L flap was deemed most appropriate for complete closure of the defect.  Using a sterile surgical marker, an appropriate flap was drawn incorporating the defect and placing the expected incisions within the relaxed skin tension lines where possible.    The area thus outlined was incised deep to adipose tissue with a #15 scalpel blade.  The skin margins were undermined to an appropriate distance in all directions utilizing iris scissors. Yes

## 2020-07-06 NOTE — PATIENT PROFILE ADULT - DO YOU FEEL THREATENED BY OTHERS?
Ear Wedge Repair Text: A wedge excision was completed by carrying down an excision through the full thickness of the ear and cartilage with an inward facing Burow's triangle. The wound was then closed in a layered fashion. no

## 2020-09-17 NOTE — CONSULT NOTE ADULT - REASON FOR ADMISSION
complain of shortness of breath Purse String (Simple) Text: We discussed various closure modalities with the patient, including healing by second intention, primary closure, skin graft and various flaps, and felt that the location and configuration of the defect indicated that a purse-string closure would result in the least disturbance of the position and function of the surrounding anatomic structures and provide the best result.  The technique, its benefits, alternatives and risks were discussed with the patient.  Appropriate instructions were given, informed consent was obtained, and then the patient underwent the procedure as follows: The patient was taken to the operative suite and placed supine on the operating room table.  The area of the defect and the surrounding skin was anesthetized with buffered 0.5% lidocaine with epinephrine.  The area was washed with chlorhexidine.  Sterile drapes were applied. \\n\\nThe wound edges of the Mohs surgery defect were debeveled. \\n Undermining was performed circumferentially around the surgical defect.  Closure of the subcutaneous was achieved with a deep buried purse string suture, which was then placed and tightened in the subcutaneous tissue.  A superficial layer of horizontal mattress suture was then placed through the epidermis in a purse-string fashion.

## 2020-10-05 NOTE — PROGRESS NOTE ADULT - PROBLEM SELECTOR PLAN 4
Constitutional: no fever, chills, night sweats  Ears: no hearing changes or ear pain,   Nose: no nasal congestion, sinus pain, or rhinorrhea  Cardio: chest pain positive.  No orthopnea, edema, or palpitations  Resp: no dyspnea, cough, wheezing  GI: no nausea, vomiting, diarrhea, constipation, hematochezia, or melena  : no dysuria, urinary frequency, hematuria  MSK: no back pain, neck pain  Skin: no rash, pruritis   Neuro: difficulty walking positive.  No weakness, dizziness, lightheadedness, syncope   Heme/Lymph: no bruising or bleeding
CC planned for zofia.
See above.
See above.
CC planned for zofia.

## 2021-09-09 NOTE — ED STATDOCS - PMH
Anemia    Atrial fibrillation    CAD (coronary artery disease)    HTN (hypertension)    Lung cancer    MI (myocardial infarction)    Peptic ulcer disease
4-2019

## 2021-10-20 NOTE — PATIENT PROFILE ADULT - HAVE YOU EXPERIENCED A TRAUMATIC EVENT?
Price (Do Not Change): 0.00 Instructions: This plan will send the code FBSE to the PM system.  DO NOT or CHANGE the price. Detail Level: Simple no

## 2022-03-10 NOTE — PROGRESS NOTE ADULT - PROBLEM/PLAN-4
DISPLAY PLAN FREE TEXT Subsequent Stages Histo Method Verbiage: Using a similar technique to that described above, a thin layer of tissue was removed from all areas where tumor was visible on the previous stage.  The tissue was again oriented, mapped, dyed, and processed as above.

## 2022-07-13 NOTE — H&P ADULT - CONSTITUTIONAL
detailed exam
Patient placed in R posterior splint with ponce compression. F/u with orthopedics tomorrow. Appointment made via social work

## 2022-07-19 NOTE — PROGRESS NOTE ADULT - SUBJECTIVE AND OBJECTIVE BOX
1 day PO: Patient is doing well post-operatively. The importance of post-op drop compliance was emphasized. Drop schedule reviewed with patient. Patient to call if any visual changes or concerns. PCP:    REQUESTING PHYSICIAN:    REASON FOR CONSULT:    CHIEF COMPLAINT:    HPI:  HPI:  80 year old woman with a history of CAD, prior coronary stents with progression of CAD on recent coronary angiography, paroxysmal atrial fibrillation (off anticoagulation due to recent GI bleed), peripheral artery disease s/p recent surgical revascularization (aorto-femoral bypass surgery at [facility] last week), lung cancer, emphysema, who presented to the ER on 2020 for the evaluation of cough, dyspnea, and poorly-controlled HTN several hours after her arrival at a rehabilitation nursing facility.  She describes recent "bronchitis" that has progressively worsened over the past 1 week with cough and mild shortness of breath.  In the ED and was diagnosed with an exacerbation of HFpEF and uncontrolled HTN.      3/2/2020: Breathing better.  No chest pain. Picture seems c/w Mild CHF and bronchitis on top of COPD combo.    3/3/2020: Had mild SOB last night that responded to IV lasix.  Feels better this AM. BP better. OOB this AM.   3/4/20: Pt feels weak. Complains of incisional pain. Denies chest pain.   3/5/2020:  Episode of dyspnea last night that improved with additional diuretic dose; feels "better" this AM; improved cough.  3/6/20: Less short of breath today. Feels weak    PAST MEDICAL & SURGICAL HISTORY:  Anemia  Lung cancer  Peptic ulcer disease  Atrial fibrillation  MI (myocardial infarction)  CAD (coronary artery disease)  HTN (hypertension)  H/O aorto-femoral bypass      SOCIAL HISTORY:    FAMILY HISTORY:  Family history of intracranial hemorrhage      ALLERGIES:  Allergies    predniSONE (Other (Moderate))    Intolerances        MEDICATIONS:    MEDICATIONS  (STANDING):  albuterol/ipratropium for Nebulization 3 milliLiter(s) Nebulizer every 6 hours  aMIOdarone    Tablet 200 milliGRAM(s) Oral daily  amLODIPine   Tablet 5 milliGRAM(s) Oral <User Schedule>  aspirin enteric coated 81 milliGRAM(s) Oral daily  atorvastatin 80 milliGRAM(s) Oral at bedtime  clopidogrel Tablet 75 milliGRAM(s) Oral daily  ferrous    sulfate 325 milliGRAM(s) Oral daily  folic acid 1 milliGRAM(s) Oral daily  furosemide    Tablet 40 milliGRAM(s) Oral two times a day  hydrALAZINE 25 milliGRAM(s) Oral every 8 hours  losartan 100 milliGRAM(s) Oral daily  magnesium oxide 400 milliGRAM(s) Oral daily  metoprolol succinate  milliGRAM(s) Oral daily  multivitamin 1 Tablet(s) Oral daily  pantoprazole    Tablet 40 milliGRAM(s) Oral before breakfast  potassium chloride    Tablet ER 10 milliEquivalent(s) Oral daily  senna 2 Tablet(s) Oral at bedtime    MEDICATIONS  (PRN):  acetaminophen   Tablet .. 650 milliGRAM(s) Oral every 6 hours PRN Mild Pain (1 - 3)  benzocaine 15 mG/menthol 3.6 mG (Sugar-Free) Lozenge 1 Lozenge Oral every 4 hours PRN Sore Throat  guaiFENesin   Syrup  (Sugar-Free) 200 milliGRAM(s) Oral every 6 hours PRN Cough  ondansetron Injectable 4 milliGRAM(s) IV Push every 6 hours PRN Nausea and/or Vomiting  oxyCODONE    IR 5 milliGRAM(s) Oral every 8 hours PRN Moderate Pain (4 - 6)        Vital Signs Last 24 Hrs  T(C): 36.6 (06 Mar 2020 11:13), Max: 37 (05 Mar 2020 21:10)  T(F): 97.9 (06 Mar 2020 11:13), Max: 98.6 (05 Mar 2020 21:10)  HR: 70 (06 Mar 2020 11:13) (65 - 80)  BP: 142/41 (06 Mar 2020 11:13) (142/41 - 165/45)  BP(mean): --  RR: 18 (06 Mar 2020 11:13) (18 - 18)  SpO2: 95% (06 Mar 2020 11:13) (90% - 97%)Daily     Daily Weight in k.4 (06 Mar 2020 05:11)I&O's Summary    05 Mar 2020 07:01  -  06 Mar 2020 07:00  --------------------------------------------------------  IN: 0 mL / OUT: 500 mL / NET: -500 mL        PHYSICAL EXAM:    Constitutional: NAD, awake and alert, well-developed  HEENT: PERR, EOMI,  No oral cyananosis.  Neck:  supple,  No JVD  Respiratory: Breath sounds are clear bilaterally, No wheezing, rales or rhonchi  Cardiovascular: S1 and S2, regular rate and rhythm, no Murmurs, gallops or rubs  Gastrointestinal: Bowel Sounds present, soft, nontender.   Extremities: No peripheral edema. No clubbing or cyanosis.  Vascular: 2+ peripheral pulses  Neurological: A/O x 3, no focal deficits  Musculoskeletal: no calf tenderness.  Skin: No rashes.      LABS: All Labs Reviewed:                        9.6    9.83  )-----------( 442      ( 05 Mar 2020 08:16 )             29.8                         9.8    12.80 )-----------( 423      ( 04 Mar 2020 11:50 )             31.3     06 Mar 2020 07:57    131    |  97     |  16     ----------------------------<  111    3.8     |  27     |  0.91   05 Mar 2020 08:16    137    |  100    |  14     ----------------------------<  157    3.7     |  30     |  0.94   04 Mar 2020 07:17    136    |  100    |  14     ----------------------------<  100    4.3     |  30     |  0.75     Ca    8.8        06 Mar 2020 07:57  Ca    8.6        05 Mar 2020 08:16  Ca    8.6        04 Mar 2020 07:17            Blood Culture:         RADIOLOGY/EKG:< from: 12 Lead ECG (20 @ 02:08) >    Diagnosis Line Sinus rhythm with Premature atrial complexes  ST & T wave abnormality, consider lateral ischemia  Prolonged QT  Abnormal ECG  Confirmed by Johan Koroma () on 3/1/2020 10:36:53 AM    < end of copied text >        ECHO/CARDIAC CATHTERIZATION/STRESS TEST:  < from: Transthoracic Echocardiogram (20 @ 08:37) >  Impression     Summary     The left ventricle is normal in size, wall thickness, wall motion and   contractility.   Estimated left ventricular ejection fraction is 60-65 %.   The left atrium is mildly dilated.   Normal appearing right ventricle structure and function.   Fibrocalcific changes noted to the Aortic valve leaflets with preserved   leaflet excursion.   Mild mitral annular calcification is present.   The mitral valve leaflets appear thickened with mildly restricted leaflet   excursion.   Moderate (2+) mitral regurgitation is present.   No evidence of pericardial effusion.     Signature     ----------------------------------------------------------------   Electronically signed by Gera Bai MD(Interpreting physician)    < end of copied text >

## 2022-07-28 NOTE — ED PROVIDER NOTE - DR. NAME
Wound care consulted for \"decubitus ulcer\" per Dr. Sebastián Barrett:  Chart reviewed and patient assessed. Pt. Has been seen several times at the out patient wound center for leg wounds as well as the sacrum wounds and now heels. Nursing documenting these as Stage 1 pressure injuries. Pt. Is 64years old and she was admitted for Covid 19 infection (currently on Room air). She has hx of bilateral lower leg wounds to include the heels. She has diabetes, HTN, Hypothyroid, Afib on anticoagulants, Hx of CAD, CHF and CVA . Pt. Has residual permanent left side paralysis. Her  and son take care of her at home. She has a hospital bed at home. Pt. Is non-ambulatory. Assessment today with Treatment:   Pt. C/o \"sore bottom\". She is incontinent of urine and feces and she has severe Moisture associated skin damage with some pressure areas / wounds stage 2. WOUND POA CONDITIONS        Wound Heel Left (Active)   Wound Image   07/27/22 2330   Wound Etiology Pressure Stage 1 07/27/22 2330   Cleansed Not cleansed 07/27/22 2330   Dressing/Treatment Open to air 07/28/22 1724   Offloading for Diabetic Foot Ulcers Other (comment) 07/27/22 2330   Drainage Amount None 07/27/22 2330   Wound Odor None 07/27/22 2330   Ebony-Wound/Incision Assessment Dry/flaky 07/27/22 2330   Wound Thickness Description Partial thickness 07/27/22 2330       Wound Sacrum Left;Right;Mid (Active)   Wound Image    07/28/22 1724   Wound Etiology Pressure Stage 2 07/28/22 1724   Dressing Status New dressing applied 07/28/22 1724   Cleansed Cleansed with saline 07/28/22 1724   Dressing/Treatment Zinc paste; Foam 07/28/22 1724   Offloading for Diabetic Foot Ulcers Offloading ordered 07/28/22 1724   Wound Assessment Pink/red 07/28/22 1724   Drainage Amount Moderate 07/28/22 1724   Drainage Description Serosanguinous 07/28/22 1724   Wound Odor None 07/28/22 1724   Ebony-Wound/Incision Assessment Fragile;Denuded 07/28/22 1724   Wound Thickness Description Partial thickness 07/28/22 1724       Wound Gluteal fold/cleft Right (Active)   Wound Image   07/28/22 1724   Wound Etiology Skin Tear 07/28/22 1724   Dressing Status New dressing applied 07/28/22 1724   Cleansed Cleansed with saline 07/28/22 1724   Dressing/Treatment Zinc paste; Foam 07/28/22 1724   Wound Assessment Pink/red;Bleeding 07/28/22 1724   Drainage Amount Small 07/28/22 1724   Drainage Description Serosanguinous 07/28/22 1724   Wound Odor None 07/28/22 1724   Ebony-Wound/Incision Assessment Fragile;Edematous 07/28/22 1724   Wound Thickness Description Partial thickness 07/28/22 1724       Wound Foot Left (Active)   Wound Image   07/27/22 2330   Wound Etiology Pressure Unstageable 07/27/22 2330   Cleansed Soap and water 07/27/22 2330   Dressing/Treatment Open to air 07/27/22 2330   Wound Assessment Dry 07/27/22 2330   Drainage Amount None 07/27/22 2330   Wound Odor None 07/27/22 2330       Wound Foot Right (Active)   Wound Image   07/27/22 2330   Wound Etiology Pressure Unstageable 07/27/22 2330   Cleansed Soap and water 07/27/22 2330   Dressing/Treatment Open to air 07/27/22 2330   Wound Assessment Dry 07/27/22 2330   Drainage Amount None 07/27/22 2330   Wound Odor None 07/27/22 2330       Treatment: Patient was turned to her left side and the Chux changed after cleansing the skin. Bleeding had to be stopped by holding pressure to the skin for a few minutes. The wounds on the sacrum / buttocks were treated with Zinc oxide cream. She will be upgraded to the Desitin cream. Foam dressings applied to the sacrum and to the right gluteal fold / thigh. The heels are floated. The chux is new under her. Patient was fed her dinner. She ate abouit 3/4 of her dinner but was not very thirsty. Plan: wound care orders written for Desitin cream and foam dressings to protect against friction and shear. Heels floated, Turn patient every two hours.      Sharon Dubose RN, BSN, [employer]

## 2022-10-03 NOTE — PATIENT PROFILE ADULT - OVER THE PAST TWO WEEKS HAVE YOU FELT DOWN, DEPRESSED OR HOPELESS?
----- Message from Stewart Macedo sent at 10/3/2022  8:56 AM CDT -----  Regarding: [facility] ED referral from 10/01, medicaid, call pt   Contact: pt   [facility] ED referral from 10/01, medicaid, call pt     
Returned call to pt, no answer. Apt scheduled, left M to return call to clinic.   
no

## 2022-11-04 NOTE — ED ADULT NURSE NOTE - CHPI ED NUR SYMPTOMS NEG
Discharge Inst-CATH/EP


Problems Reviewed?:  Yes


Post Cardiac Cath/EP D/C Inst


Follow Up/Plan


Appointment with Dr. Rm's office in 1 to 2 weeks


<b>CARDIAC CATH/EP PROCEDURE DISCHARGE INSTRUCTIONS</b>





ACTIVITY





* Go Home directly and rest.


* Limit activity of the leg (or wrist if it was used) for 7 days including aer

obics, swimming,


   jogging, bicycling, etc.


* Restrict stair-climbing for 7 days if possible, if not, climb up with your 

non-cath leg, then


   bring together on the same step.


* Avoid lifting, pushing, pulling or excessive movement of the affected extremi

ty for 7 days.


* Customary sexual activity may be resumed after 2 days-use caution not to use a

position  


   that strains or causes pain to the affected extremity.


* No driving for 24 hours.


* NO SMOKING. 


* Avoid straining for bowel movements for 7 days.


* Gentle walking on level ground is allowed.


* Returning to work will depend on the type of procedure and the results. Your 

doctor will discuss


   this with you.





CALL YOUR DOCTOR FOR ANY OF THE FOLLOWING:





*If bleeding from the puncture site occurs- Apply gentle pressure to site with 

clean cloth and call


   your doctor or EMS.


* If a knot or lump forms under the skin, increases in size, or causes pain.


* If bruising appears to be worsening or moving further down your leg instead of

disappearing.


* Temperature above 101 F.





CARE OF YOUR GROIN INCISION;





* Bruising or purple discoloration of the skin near the puncture site is common.


* You may shower only, no bathtub bathing for 5 days.  Be careful to avoid 

slipping as your


   leg may feel stiff.


* If a closure device was used on your femoral artery, please see the attached 

guide regarding


   care of the device and your leg.


* Leave dressing on FOR 24 hours.





CARE OF YOUR WRIST INCISION;





* Bruising or purple discoloration of the skin near the puncture site is common.


* You may shower.


* DO NOT submerge wrist.


* Leave dressing on FOR 24 hours.











REMY RM MD               Nov 4, 2022 09:26 no chills/no fever/no pain/no dizziness/no decreased eating/drinking/no vomiting/no nausea

## 2023-07-11 NOTE — PROVIDER CONTACT NOTE (CHANGE IN STATUS NOTIFICATION) - ASSESSMENT
Patient had 3 time of SVT, patient felt palpitations but no chest pain. Last metoprolol was given 20 mins ago, V/S 161/93 pulse 107, spo2 94%, RR 18, temp 98.4 was notified to MD Dominguez. 98.6

## 2023-08-11 NOTE — ED ADULT NURSE NOTE - CCCP TRG CHIEF CMPLNT
I am not able to remove past medical history as this was diagnosed by a different provider.   chest pressure

## 2023-08-29 NOTE — PATIENT PROFILE ADULT. - NS TRANSFER DISPOSITION PATIENT BELONGINGS
with patient Aklief counseling:  Patient advised to apply a pea-sized amount only at bedtime and wait 30 minutes after washing their face before applying.  If too drying, patient may add a non-comedogenic moisturizer.  The most commonly reported side effects including irritation, redness, scaling, dryness, stinging, burning, itching, and increased risk of sunburn.  The patient verbalized understanding of the proper use and possible adverse effects of retinoids.  All of the patient's questions and concerns were addressed.

## 2024-02-14 NOTE — PROGRESS NOTE ADULT - PROBLEM SELECTOR PLAN 4
Detail Level: Generalized Remains in sinus rhythm; off AC due to GI bleed (recurrent); continue aspirin, amiodarone. Detail Level: Detailed

## 2024-05-16 NOTE — PROGRESS NOTE ADULT - PROBLEM/PLAN-2
Submitted Vyvanse PRIOR AUTHORIZATION through EPIC.  
DISPLAY PLAN FREE TEXT

## 2024-07-01 NOTE — ED ADULT NURSE NOTE - OBJECTIVE STATEMENT
Detail Level: Detailed Depth Of Biopsy: dermis Was A Bandage Applied: Yes Size Of Lesion In Cm: 0.3 Pt with weakness x 2 days. Pt denies CP, SOB and pain. Pt had lobectomy 15 days ago for lung CA. Pt developed A-fib on table. X Size Of Lesion In Cm: 0.5 Biopsy Type: H and E Biopsy Method: double edge Personna blade Anesthesia Type: 1% lidocaine with epinephrine Additional Anesthesia Volume In Cc (Will Not Render If 0): 0 Hemostasis: Aluminum Chloride Wound Care: Petrolatum Dressing: Band-Aid Destruction After The Procedure: No Type Of Destruction Used: Curettage Curettage Text: The wound bed was treated with curettage after the biopsy was performed. Cryotherapy Text: The wound bed was treated with cryotherapy after the biopsy was performed. Electrodesiccation Text: The wound bed was treated with electrodesiccation after the biopsy was performed. Electrodesiccation And Curettage Text: The wound bed was treated with electrodesiccation and curettage after the biopsy was performed. Silver Nitrate Text: The wound bed was treated with silver nitrate after the biopsy was performed. Lab: [id number] Lab Facility: [id number] Consent: Verbal consent was obtained and risks were reviewed including but not limited to scarring, infection, bleeding, scabbing, incomplete removal, nerve damage and allergy to anesthesia. Post-Care Instructions: I reviewed with the patient in detail post-care instructions. Patient is to keep the biopsy site dry overnight, and then apply vaseline daily until healed. Notification Instructions: Patient will be notified of biopsy results. However, patient instructed to call the office if not contacted within 2 weeks. Billing Type: Third-Party Bill Information: Selecting Yes will display possible errors in your note based on the variables you have selected. This validation is only offered as a suggestion for you. PLEASE NOTE THAT THE VALIDATION TEXT WILL BE REMOVED WHEN YOU FINALIZE YOUR NOTE. IF YOU WANT TO FAX A PRELIMINARY NOTE YOU WILL NEED TO TOGGLE THIS TO 'NO' IF YOU DO NOT WANT IT IN YOUR FAXED NOTE.

## 2024-10-07 NOTE — ED PROVIDER NOTE - PROGRESS NOTE DETAILS
How are the patient and her  doing? Are they still symptomatic or improved at this time?   patient remains comfortable; tolerating po  evaluated by Dr. Luna who agrees with plan for discharge home

## 2024-10-30 NOTE — PROGRESS NOTE ADULT - PROBLEM SELECTOR PROBLEM 1
NSTEMI (non-ST elevated myocardial infarction) Patient Specific Counseling (Will Not Stick From Patient To Patient): Information Sheets given to the patient are noted here or elsewhere in this note; Detail Level: Generalized Detail Level: Detailed